# Patient Record
Sex: FEMALE | Race: WHITE | Employment: FULL TIME | URBAN - METROPOLITAN AREA
[De-identification: names, ages, dates, MRNs, and addresses within clinical notes are randomized per-mention and may not be internally consistent; named-entity substitution may affect disease eponyms.]

---

## 2017-01-09 ENCOUNTER — LAB ENCOUNTER (OUTPATIENT)
Dept: LAB | Facility: HOSPITAL | Age: 62
End: 2017-01-09
Attending: FAMILY MEDICINE
Payer: COMMERCIAL

## 2017-01-09 DIAGNOSIS — E78.5 HYPERLIPIDEMIA, UNSPECIFIED HYPERLIPIDEMIA TYPE: ICD-10-CM

## 2017-01-09 DIAGNOSIS — IMO0001 UNCONTROLLED TYPE 2 DIABETES MELLITUS WITHOUT COMPLICATION, WITH LONG-TERM CURRENT USE OF INSULIN: ICD-10-CM

## 2017-01-09 DIAGNOSIS — I10 BENIGN HYPERTENSION: ICD-10-CM

## 2017-01-09 LAB
ALBUMIN SERPL-MCNC: 4.1 G/DL (ref 3.5–4.8)
ALP LIVER SERPL-CCNC: 72 U/L (ref 50–130)
ALT SERPL-CCNC: 38 U/L (ref 14–54)
AST SERPL-CCNC: 18 U/L (ref 15–41)
BASOPHILS # BLD AUTO: 0.05 X10(3) UL (ref 0–0.1)
BASOPHILS NFR BLD AUTO: 0.9 %
BILIRUB SERPL-MCNC: 0.5 MG/DL (ref 0.1–2)
BUN BLD-MCNC: 20 MG/DL (ref 8–20)
CALCIUM BLD-MCNC: 9.1 MG/DL (ref 8.3–10.3)
CHLORIDE: 107 MMOL/L (ref 101–111)
CHOLEST SMN-MCNC: 273 MG/DL (ref ?–200)
CO2: 24 MMOL/L (ref 22–32)
CREAT BLD-MCNC: 0.7 MG/DL (ref 0.55–1.02)
CREAT UR-SCNC: 44.5 MG/DL
EOSINOPHIL # BLD AUTO: 0.1 X10(3) UL (ref 0–0.3)
EOSINOPHIL NFR BLD AUTO: 1.8 %
ERYTHROCYTE [DISTWIDTH] IN BLOOD BY AUTOMATED COUNT: 12.6 % (ref 11.5–16)
EST. AVERAGE GLUCOSE BLD GHB EST-MCNC: 117 MG/DL (ref 68–126)
FREE T4: 1 NG/DL (ref 0.9–1.8)
GLUCOSE BLD-MCNC: 112 MG/DL (ref 70–99)
HBA1C MFR BLD HPLC: 5.7 % (ref ?–5.7)
HCT VFR BLD AUTO: 44.4 % (ref 34–50)
HDLC SERPL-MCNC: 60 MG/DL (ref 45–?)
HDLC SERPL: 4.55 {RATIO} (ref ?–4.44)
HGB BLD-MCNC: 14.7 G/DL (ref 12–16)
IMMATURE GRANULOCYTE COUNT: 0.02 X10(3) UL (ref 0–1)
IMMATURE GRANULOCYTE RATIO %: 0.4 %
LDLC SERPL CALC-MCNC: 183 MG/DL (ref ?–130)
LYMPHOCYTES # BLD AUTO: 2.85 X10(3) UL (ref 0.9–4)
LYMPHOCYTES NFR BLD AUTO: 52.2 %
M PROTEIN MFR SERPL ELPH: 7.8 G/DL (ref 6.1–8.3)
MCH RBC QN AUTO: 30.1 PG (ref 27–33.2)
MCHC RBC AUTO-ENTMCNC: 33.1 G/DL (ref 31–37)
MCV RBC AUTO: 90.8 FL (ref 81–100)
MICROALBUMIN UR-MCNC: 1.32 MG/DL
MICROALBUMIN/CREAT 24H UR-RTO: 29.7 UG/MG (ref ?–30)
MONOCYTES # BLD AUTO: 0.44 X10(3) UL (ref 0.1–0.6)
MONOCYTES NFR BLD AUTO: 8.1 %
NEUTROPHIL ABS PRELIM: 2 X10 (3) UL (ref 1.3–6.7)
NEUTROPHILS # BLD AUTO: 2 X10(3) UL (ref 1.3–6.7)
NEUTROPHILS NFR BLD AUTO: 36.6 %
NONHDLC SERPL-MCNC: 213 MG/DL (ref ?–130)
PLATELET # BLD AUTO: 237 10(3)UL (ref 150–450)
POTASSIUM SERPL-SCNC: 4.3 MMOL/L (ref 3.6–5.1)
RBC # BLD AUTO: 4.89 X10(6)UL (ref 3.8–5.1)
RED CELL DISTRIBUTION WIDTH-SD: 41.9 FL (ref 35.1–46.3)
SODIUM SERPL-SCNC: 139 MMOL/L (ref 136–144)
TRIGLYCERIDES: 150 MG/DL (ref ?–150)
TSI SER-ACNC: 1.09 MIU/ML (ref 0.35–5.5)
VLDL: 30 MG/DL (ref 5–40)
WBC # BLD AUTO: 5.5 X10(3) UL (ref 4–13)

## 2017-01-09 PROCEDURE — 36415 COLL VENOUS BLD VENIPUNCTURE: CPT

## 2017-01-09 PROCEDURE — 85025 COMPLETE CBC W/AUTO DIFF WBC: CPT

## 2017-01-09 PROCEDURE — 80053 COMPREHEN METABOLIC PANEL: CPT

## 2017-01-09 PROCEDURE — 82043 UR ALBUMIN QUANTITATIVE: CPT

## 2017-01-09 PROCEDURE — 80061 LIPID PANEL: CPT

## 2017-01-09 PROCEDURE — 83036 HEMOGLOBIN GLYCOSYLATED A1C: CPT

## 2017-01-09 PROCEDURE — 82570 ASSAY OF URINE CREATININE: CPT

## 2017-01-09 PROCEDURE — 84439 ASSAY OF FREE THYROXINE: CPT

## 2017-01-09 PROCEDURE — 84443 ASSAY THYROID STIM HORMONE: CPT

## 2017-05-05 ENCOUNTER — APPOINTMENT (OUTPATIENT)
Dept: LAB | Facility: HOSPITAL | Age: 62
End: 2017-05-05
Attending: FAMILY MEDICINE
Payer: COMMERCIAL

## 2017-05-05 DIAGNOSIS — E11.9 TYPE 2 DIABETES MELLITUS WITHOUT COMPLICATION, WITHOUT LONG-TERM CURRENT USE OF INSULIN (HCC): ICD-10-CM

## 2017-05-05 PROCEDURE — 83036 HEMOGLOBIN GLYCOSYLATED A1C: CPT

## 2017-05-05 PROCEDURE — 80048 BASIC METABOLIC PNL TOTAL CA: CPT

## 2017-05-05 PROCEDURE — 36415 COLL VENOUS BLD VENIPUNCTURE: CPT

## 2017-08-23 ENCOUNTER — APPOINTMENT (OUTPATIENT)
Dept: LAB | Facility: HOSPITAL | Age: 62
End: 2017-08-23
Attending: FAMILY MEDICINE
Payer: COMMERCIAL

## 2017-08-23 DIAGNOSIS — R73.9 HYPERGLYCEMIA: ICD-10-CM

## 2017-08-23 DIAGNOSIS — Z86.39 HISTORY OF DIABETES MELLITUS: ICD-10-CM

## 2017-08-23 LAB
BUN BLD-MCNC: 12 MG/DL (ref 8–20)
CALCIUM BLD-MCNC: 9.7 MG/DL (ref 8.3–10.3)
CHLORIDE: 107 MMOL/L (ref 101–111)
CO2: 23 MMOL/L (ref 22–32)
CREAT BLD-MCNC: 0.69 MG/DL (ref 0.55–1.02)
EST. AVERAGE GLUCOSE BLD GHB EST-MCNC: 123 MG/DL (ref 68–126)
GLUCOSE BLD-MCNC: 112 MG/DL (ref 70–99)
HBA1C MFR BLD HPLC: 5.9 % (ref ?–5.7)
POTASSIUM SERPL-SCNC: 4.1 MMOL/L (ref 3.6–5.1)
SODIUM SERPL-SCNC: 139 MMOL/L (ref 136–144)

## 2017-08-23 PROCEDURE — 80048 BASIC METABOLIC PNL TOTAL CA: CPT

## 2017-08-23 PROCEDURE — 83036 HEMOGLOBIN GLYCOSYLATED A1C: CPT

## 2017-08-23 PROCEDURE — 36415 COLL VENOUS BLD VENIPUNCTURE: CPT

## 2018-12-06 ENCOUNTER — HOSPITAL ENCOUNTER (EMERGENCY)
Facility: HOSPITAL | Age: 63
Discharge: HOME | End: 2018-12-06
Attending: EMERGENCY MEDICINE
Payer: COMMERCIAL

## 2018-12-06 VITALS
DIASTOLIC BLOOD PRESSURE: 87 MMHG | RESPIRATION RATE: 14 BRPM | HEART RATE: 95 BPM | HEIGHT: 68 IN | SYSTOLIC BLOOD PRESSURE: 148 MMHG | BODY MASS INDEX: 27.28 KG/M2 | OXYGEN SATURATION: 97 % | WEIGHT: 180 LBS | TEMPERATURE: 98 F

## 2018-12-06 DIAGNOSIS — R04.0 EPISTAXIS: Primary | ICD-10-CM

## 2018-12-06 PROCEDURE — 99284 EMERGENCY DEPT VISIT MOD MDM: CPT

## 2018-12-06 PROCEDURE — 30903 CONTROL OF NOSEBLEED: CPT

## 2018-12-06 RX ORDER — TRAMADOL HYDROCHLORIDE 50 MG/1
50 TABLET ORAL ONCE
Status: COMPLETED | OUTPATIENT
Start: 2018-12-06 | End: 2018-12-06

## 2018-12-06 RX ORDER — TRAMADOL HYDROCHLORIDE 50 MG/1
50 TABLET ORAL EVERY 6 HOURS PRN
Qty: 10 TABLET | Refills: 0 | Status: SHIPPED | OUTPATIENT
Start: 2018-12-06 | End: 2018-12-09

## 2018-12-06 NOTE — ED PROVIDER NOTES
Patient Seen in: [facility] Emergency Department    History   Patient presents with:  Nose Bleed (nasopharyngeal)    Stated Complaint: Nosebleed for last few hours, no blood thinners    HPI    Patient is a 69-year-old female comes in emergency room Afrin-soaked cotton ball inserted into the right naris. It was removed. Attempted to cauterize right medial nasal septum but bleeding still persisted. 7.5 cm Rhino Rocket inserted with control of bleeding into right naris.       MDM   Patient is a 63-yea

## 2020-03-17 ENCOUNTER — HOSPITAL ENCOUNTER (OUTPATIENT)
Dept: MAMMOGRAPHY | Age: 65
Discharge: HOME | End: 2020-03-17
Attending: NURSE PRACTITIONER
Payer: COMMERCIAL

## 2020-03-17 DIAGNOSIS — Z12.31 BREAST CANCER SCREENING BY MAMMOGRAM: ICD-10-CM

## 2020-03-17 PROCEDURE — 77063 BREAST TOMOSYNTHESIS BI: CPT | Performed by: NURSE PRACTITIONER

## 2020-03-17 PROCEDURE — 77067 SCR MAMMO BI INCL CAD: CPT | Performed by: NURSE PRACTITIONER

## 2021-03-15 DIAGNOSIS — Z23 NEED FOR VACCINATION: ICD-10-CM

## 2021-06-24 ENCOUNTER — HOSPITAL ENCOUNTER (OUTPATIENT)
Dept: BONE DENSITY | Age: 66
Discharge: HOME | End: 2021-06-24
Attending: NURSE PRACTITIONER
Payer: MEDICARE

## 2021-06-24 ENCOUNTER — HOSPITAL ENCOUNTER (OUTPATIENT)
Dept: MAMMOGRAPHY | Age: 66
Discharge: HOME | End: 2021-06-24
Attending: NURSE PRACTITIONER
Payer: MEDICARE

## 2021-06-24 DIAGNOSIS — Z12.31 BREAST CANCER SCREENING BY MAMMOGRAM: ICD-10-CM

## 2021-06-24 DIAGNOSIS — Z78.0 ASYMPTOMATIC MENOPAUSAL STATE: ICD-10-CM

## 2021-06-24 PROCEDURE — 77063 BREAST TOMOSYNTHESIS BI: CPT | Performed by: NURSE PRACTITIONER

## 2021-06-24 PROCEDURE — 77080 DXA BONE DENSITY AXIAL: CPT | Performed by: NURSE PRACTITIONER

## 2021-06-24 PROCEDURE — 77067 SCR MAMMO BI INCL CAD: CPT | Performed by: NURSE PRACTITIONER

## 2021-07-09 ENCOUNTER — HOSPITAL ENCOUNTER (OUTPATIENT)
Dept: MAMMOGRAPHY | Facility: HOSPITAL | Age: 66
Discharge: HOME | End: 2021-07-09
Attending: NURSE PRACTITIONER
Payer: MEDICARE

## 2021-07-09 DIAGNOSIS — R92.2 INCONCLUSIVE MAMMOGRAM: ICD-10-CM

## 2021-07-09 PROCEDURE — 77065 DX MAMMO INCL CAD UNI: CPT | Performed by: NURSE PRACTITIONER

## 2021-07-09 PROCEDURE — 76642 ULTRASOUND BREAST LIMITED: CPT | Performed by: NURSE PRACTITIONER

## 2021-07-09 PROCEDURE — 77061 BREAST TOMOSYNTHESIS UNI: CPT | Performed by: NURSE PRACTITIONER

## 2023-04-25 ENCOUNTER — HOSPITAL ENCOUNTER (OUTPATIENT)
Dept: ULTRASOUND IMAGING | Age: 68
Discharge: HOME | End: 2023-04-25
Attending: NURSE PRACTITIONER
Payer: MEDICARE

## 2023-04-25 DIAGNOSIS — R09.89 CAROTID BRUIT, UNSPECIFIED LATERALITY: ICD-10-CM

## 2023-04-25 PROCEDURE — 93880 EXTRACRANIAL BILAT STUDY: CPT | Performed by: NURSE PRACTITIONER

## 2024-01-16 ENCOUNTER — HOSPITAL ENCOUNTER (OUTPATIENT)
Dept: GENERAL RADIOLOGY | Age: 69
Discharge: HOME | End: 2024-01-16
Attending: NURSE PRACTITIONER
Payer: MEDICARE

## 2024-01-16 ENCOUNTER — LAB ENCOUNTER (OUTPATIENT)
Dept: LAB | Age: 69
End: 2024-01-16
Attending: NURSE PRACTITIONER
Payer: MEDICARE

## 2024-01-16 ENCOUNTER — TELEPHONE (OUTPATIENT)
Dept: FAMILY MEDICINE CLINIC | Facility: CLINIC | Age: 69
End: 2024-01-16

## 2024-01-16 ENCOUNTER — OFFICE VISIT (OUTPATIENT)
Dept: FAMILY MEDICINE CLINIC | Facility: CLINIC | Age: 69
End: 2024-01-16
Payer: COMMERCIAL

## 2024-01-16 VITALS
BODY MASS INDEX: 31 KG/M2 | HEIGHT: 64 IN | HEART RATE: 112 BPM | OXYGEN SATURATION: 96 % | DIASTOLIC BLOOD PRESSURE: 80 MMHG | TEMPERATURE: 97 F | RESPIRATION RATE: 20 BRPM | SYSTOLIC BLOOD PRESSURE: 140 MMHG

## 2024-01-16 DIAGNOSIS — R09.89 RHONCHI: ICD-10-CM

## 2024-01-16 DIAGNOSIS — E78.00 HYPERCHOLESTEROLEMIA: ICD-10-CM

## 2024-01-16 DIAGNOSIS — R03.0 ELEVATED BLOOD PRESSURE READING IN OFFICE WITHOUT DIAGNOSIS OF HYPERTENSION: ICD-10-CM

## 2024-01-16 DIAGNOSIS — E11.9 TYPE 2 DIABETES MELLITUS WITHOUT COMPLICATION, WITHOUT LONG-TERM CURRENT USE OF INSULIN (HCC): Primary | ICD-10-CM

## 2024-01-16 DIAGNOSIS — R05.3 PERSISTENT COUGH FOR 3 WEEKS OR LONGER: ICD-10-CM

## 2024-01-16 DIAGNOSIS — E11.9 TYPE 2 DIABETES MELLITUS WITHOUT COMPLICATION, WITHOUT LONG-TERM CURRENT USE OF INSULIN (HCC): ICD-10-CM

## 2024-01-16 DIAGNOSIS — R05.8 SPASMODIC COUGH: ICD-10-CM

## 2024-01-16 LAB
ALBUMIN SERPL-MCNC: 3.5 G/DL (ref 3.4–5)
ALBUMIN/GLOB SERPL: 0.8 {RATIO} (ref 1–2)
ALP LIVER SERPL-CCNC: 90 U/L
ALT SERPL-CCNC: 37 U/L
ANION GAP SERPL CALC-SCNC: 7 MMOL/L (ref 0–18)
AST SERPL-CCNC: 41 U/L (ref 15–37)
BILIRUB SERPL-MCNC: 0.5 MG/DL (ref 0.1–2)
BUN BLD-MCNC: 9 MG/DL (ref 9–23)
CALCIUM BLD-MCNC: 9.8 MG/DL (ref 8.5–10.1)
CHLORIDE SERPL-SCNC: 110 MMOL/L (ref 98–112)
CHOLEST SERPL-MCNC: 135 MG/DL (ref ?–200)
CO2 SERPL-SCNC: 21 MMOL/L (ref 21–32)
CREAT BLD-MCNC: 0.86 MG/DL
EGFRCR SERPLBLD CKD-EPI 2021: 74 ML/MIN/1.73M2 (ref 60–?)
EST. AVERAGE GLUCOSE BLD GHB EST-MCNC: 140 MG/DL (ref 68–126)
FASTING PATIENT LIPID ANSWER: YES
FASTING STATUS PATIENT QL REPORTED: YES
GLOBULIN PLAS-MCNC: 4.6 G/DL (ref 2.8–4.4)
GLUCOSE BLD-MCNC: 136 MG/DL (ref 70–99)
HBA1C MFR BLD: 6.5 % (ref ?–5.7)
HDLC SERPL-MCNC: 44 MG/DL (ref 40–59)
LDLC SERPL CALC-MCNC: 67 MG/DL (ref ?–100)
NONHDLC SERPL-MCNC: 91 MG/DL (ref ?–130)
OSMOLALITY SERPL CALC.SUM OF ELEC: 287 MOSM/KG (ref 275–295)
POTASSIUM SERPL-SCNC: 3.5 MMOL/L (ref 3.5–5.1)
PROT SERPL-MCNC: 8.1 G/DL (ref 6.4–8.2)
SODIUM SERPL-SCNC: 138 MMOL/L (ref 136–145)
TRIGL SERPL-MCNC: 136 MG/DL (ref 30–149)
VLDLC SERPL CALC-MCNC: 21 MG/DL (ref 0–30)

## 2024-01-16 PROCEDURE — 86615 BORDETELLA ANTIBODY: CPT

## 2024-01-16 PROCEDURE — 1160F RVW MEDS BY RX/DR IN RCRD: CPT | Performed by: NURSE PRACTITIONER

## 2024-01-16 PROCEDURE — 80061 LIPID PANEL: CPT

## 2024-01-16 PROCEDURE — 71046 X-RAY EXAM CHEST 2 VIEWS: CPT | Performed by: NURSE PRACTITIONER

## 2024-01-16 PROCEDURE — 3077F SYST BP >= 140 MM HG: CPT | Performed by: NURSE PRACTITIONER

## 2024-01-16 PROCEDURE — 99204 OFFICE O/P NEW MOD 45 MIN: CPT | Performed by: NURSE PRACTITIONER

## 2024-01-16 PROCEDURE — 1159F MED LIST DOCD IN RCRD: CPT | Performed by: NURSE PRACTITIONER

## 2024-01-16 PROCEDURE — 3008F BODY MASS INDEX DOCD: CPT | Performed by: NURSE PRACTITIONER

## 2024-01-16 PROCEDURE — 80053 COMPREHEN METABOLIC PANEL: CPT

## 2024-01-16 PROCEDURE — 1170F FXNL STATUS ASSESSED: CPT | Performed by: NURSE PRACTITIONER

## 2024-01-16 PROCEDURE — 83036 HEMOGLOBIN GLYCOSYLATED A1C: CPT

## 2024-01-16 PROCEDURE — 3079F DIAST BP 80-89 MM HG: CPT | Performed by: NURSE PRACTITIONER

## 2024-01-16 RX ORDER — ROSUVASTATIN CALCIUM 10 MG/1
10 TABLET, COATED ORAL NIGHTLY
COMMUNITY
Start: 2024-01-14

## 2024-01-16 RX ORDER — LEVOFLOXACIN 500 MG/1
500 TABLET, FILM COATED ORAL DAILY
Qty: 10 TABLET | Refills: 0 | Status: SHIPPED | OUTPATIENT
Start: 2024-01-16 | End: 2024-01-26

## 2024-01-16 NOTE — PROGRESS NOTES
Ilda Gutierrez is a 68 year old female.  HPI:   New patient, [facility]. Diagnosed 7-8 years ago with diabetes. She has been taking her medications as ordered. She denies any chest pain, dizziness or lightheadedness. She does check her blood sugars at home--FBS this morning 104.   Last DM eye exam: 10/2023-->[facility].  She denies any numbness or tingling to her extremities.    She reports persistent spasmodic, \"barky\" cough that started September 2023. Sometimes cough is dry while other times productive. Feels cough has gotten worse over the past 1 month. Sometimes will feel short of breath. No fever or chills. Reports hx of chronic bronchitis. No other sick members at home. Using steam to help with cough, using vaporizer. OTC: DayQuil, Vicks. Using heating pad at night. Last Tdap--does not recall    Wt Readings from Last 6 Encounters:   12/06/18 180 lb (81.6 kg)     Current Outpatient Medications   Medication Sig Dispense Refill    metFORMIN HCl 1000 MG Oral Tab Take 1 tablet (1,000 mg total) by mouth 2 (two) times daily with meals.      rosuvastatin 10 MG Oral Tab Take 1 tablet (10 mg total) by mouth nightly.        Past Medical History:   Diagnosis Date    Hyperlipidemia       Social History:  Social History     Socioeconomic History    Marital status:    Tobacco Use    Smoking status: Never    Smokeless tobacco: Never   Vaping Use    Vaping Use: Never used   Substance and Sexual Activity    Alcohol use: Not Currently    Drug use: Never        REVIEW OF SYSTEMS:   GENERAL HEALTH: feels well otherwise  RESPIRATORY: SEE HPI  CARDIOVASCULAR: denies chest pain on exertion  GI: denies abdominal pain and denies heartburn  NEURO: denies headaches  Musculoskeletal: No motor deficits  EXAM:   /80   Pulse 112   Temp 97.2 °F (36.2 °C) (Temporal)   Resp 20   Ht 5' 4\" (1.626 m)   SpO2 96%   BMI 30.90 kg/m²   GENERAL: well developed, well nourished,in no apparent distress  HEENT: TM clear  bilaterally, nose no congestion, throat clear no erythema without mass.   EYES: PERRLA, EOM intact, sclera clear without injection  NECK: supple,no adenopathy  LUNGS: coarse rhonchi scattered throughout lung fields.   CARDIO: RRR without murmur  GI: Normal bowel sounds ×4 quadrant, no hepatosplenomegaly or masses, and no tenderness   EXTREMITIES: no cyanosis, clubbing or edema; Pulsation pedal pulse exam of both lower legs/feet is normal as well.  Bilateral barefoot skin diabetic exam is normal, visualized feet and the appearance is normal.  Bilateral monofilament/sensation of both feet is normal.  Musculoskeletal: No gross deficit  Neurological: nerves II through XII grossly intact no sensorimotor deficit  Psychological: Mood and affect are normal.  Good communication skills.  ASSESSMENT AND PLAN:     Encounter Diagnoses   Name Primary?    Type 2 diabetes mellitus without complication, without long-term current use of insulin (HCC) Yes    Hypercholesterolemia     Persistent cough for 3 weeks or longer     Spasmodic cough     Elevated blood pressure reading in office without diagnosis of hypertension        Orders Placed This Encounter   Procedures    Lipid Panel    Comp Metabolic Panel (14)    Hemoglobin A1C [E]    B.Pertussisb.Parapertussis Pcr [E]    B Pertussis IgG/IgM Ab, Quant [E]       Meds & Refills for this Visit:  Requested Prescriptions      No prescriptions requested or ordered in this encounter       Imaging & Consults:  None    Follow Up with:  No follow-up provider specified.    1. Type 2 diabetes mellitus without complication, without long-term current use of insulin (HCC)  - Lipid Panel; Future  - Comp Metabolic Panel (14); Future  - Hemoglobin A1C [E]; Future    2. Hypercholesterolemia  - Lipid Panel; Future  - Comp Metabolic Panel (14); Future    3. Persistent cough for 3 weeks or longer  - XR CHEST PA + LAT CHEST (CPT=71046); Future  - B.Pertussisb.Parapertussis Pcr [E]; Future  - B Pertussis  IgG/IgM Ab, Quant [E]; Future    4. Spasmodic cough    5. Rhonchi  - XR CHEST PA + LAT CHEST (CPT=71046); Future    6. Elevated blood pressure reading in office without diagnosis of hypertension      BP elevated on exam today x 2. Per patient she has not taken HTN medication previously.   CXR as ordered.  Pertussis PCR/IgG/IgM as ordered.  Labs today.  Await XR results for further recommendations.  Follow up in office in 1 week re: elevated BP and cough      The patient indicates understanding of these issues and agrees to the plan.  The patient is asked to return in 1 wk.

## 2024-01-17 LAB
B PERTUSSIS IGG AB: 1.51 INDEX
B PERTUSSIS IGG AB: 1.51 INDEX
B PERTUSSIS IGM AB: 1.1 INDEX
B PERTUSSIS IGM AB: 1.1 INDEX

## 2024-01-22 ENCOUNTER — TELEPHONE (OUTPATIENT)
Dept: FAMILY MEDICINE CLINIC | Facility: CLINIC | Age: 69
End: 2024-01-22

## 2024-01-23 RX ORDER — AZITHROMYCIN 250 MG/1
TABLET, FILM COATED ORAL
Qty: 6 TABLET | Refills: 0 | Status: SHIPPED | OUTPATIENT
Start: 2024-01-23 | End: 2024-01-27

## 2024-01-25 ENCOUNTER — TELEMEDICINE (OUTPATIENT)
Dept: FAMILY MEDICINE CLINIC | Facility: CLINIC | Age: 69
End: 2024-01-25
Payer: COMMERCIAL

## 2024-01-25 DIAGNOSIS — A37.90 PERTUSSIS: ICD-10-CM

## 2024-01-25 DIAGNOSIS — E11.9 TYPE 2 DIABETES MELLITUS WITHOUT COMPLICATION, WITHOUT LONG-TERM CURRENT USE OF INSULIN (HCC): ICD-10-CM

## 2024-01-25 DIAGNOSIS — R03.0 ELEVATED BLOOD PRESSURE READING IN OFFICE WITHOUT DIAGNOSIS OF HYPERTENSION: ICD-10-CM

## 2024-01-25 DIAGNOSIS — J18.9 PNEUMONIA OF LEFT LOWER LOBE DUE TO INFECTIOUS ORGANISM: Primary | ICD-10-CM

## 2024-01-25 DIAGNOSIS — E78.00 HYPERCHOLESTEROLEMIA: ICD-10-CM

## 2024-01-25 DIAGNOSIS — R05.8 SPASMODIC COUGH: ICD-10-CM

## 2024-01-25 PROCEDURE — 1160F RVW MEDS BY RX/DR IN RCRD: CPT | Performed by: NURSE PRACTITIONER

## 2024-01-25 PROCEDURE — 1159F MED LIST DOCD IN RCRD: CPT | Performed by: NURSE PRACTITIONER

## 2024-01-25 PROCEDURE — 99214 OFFICE O/P EST MOD 30 MIN: CPT | Performed by: NURSE PRACTITIONER

## 2024-01-25 NOTE — PROGRESS NOTES
Subjective     HPI:   Ilda Gutierrez verbally consents to a Virtual/Telephone Check-In service on 01/25/24. Patient understands and accepts financial responsibility for any deductible, co-insurance and/or co-pays associated with this service. This visit is conducted using Telemedicine with live, interactive video and audio.     I returned Ilda Gutierrez call by secure video chat, verified date of birth, and discussed their current concerns:   Follow-up on recent pneumonia and pertussis diagnosis.  Patient was seen in the office last week and imaging completed suggestive of left lower lobe pneumonia.  Her IgM titer for pertussis came back at 1.1.  She reports it has been 20+ years since she received a Tdap vaccine.  Patient has been taking the Levaquin and azithromycin as ordered.  She denies any side effects of the medication.  She denies any muscle aches, joint pains or cramping.  She reports that her cough has markedly improved compared to last week.  Notes occasional cough but cough is no longer spasmodic or productive.  She denies any shortness of breath or chest pain.  She denies any fever or chills.  Patient had recent labs completed which showed her A1c was at 6.5%.  She is currently taking metformin 500 mg twice daily.  We discussed increasing this to metformin 1000 mg twice daily.  On patient's x-ray it did note atheromatosis calcified plaque seen within the aorta.  Patient did have a carotid Doppler in 2023 which showed mixed atherosclerotic plaque at the carotid bifurcations bilaterally.  Patient is taking a statin.  Her most recent LDL was 67.  She has never had an ultrafast heart scan completed.  She is not taking a baby aspirin.  Monitors blood sugars in the morning--every other day, sometimes in the afternoon and readings around 110 2 hours post prandial.   Checking blood pressure at home- most recent readings  116/68  127/69       No Further Nursing Notes to Review            REVIEW OF  SYSTEMS:  Pertinent items are noted in HPI.             Physical Exam:  alert, appears stated age, and cooperative, Speaking in full sentences comfortably, Normal work of breathing, and mood and affect are normal--good communication        Assessment    Diagnoses and all orders for this visit:    Pneumonia of left lower lobe due to infectious organism  -     XR CHEST PA + LAT CHEST (CPT=71046); Future    Pertussis    Spasmodic cough    Elevated blood pressure reading in office without diagnosis of hypertension    Type 2 diabetes mellitus without complication, without long-term current use of insulin (HCC)    Hypercholesterolemia    Reviewed recent lab work with the patient as well as CXR.  Discussed completing [facility]- information provided to patient.  Reviewed and discussed US carotid doppler 2023 with the patient.   Discussed goal of LDL <70.  Continue statin.  START enteric coated ASA 81 mg daily.  Increase Metformin to 1,000 mg BID.  Focus on healthy eating, routine exercise.  Continue to monitor BP at home--call office if BP consistently >140/90.  Reviewed CXR with the patient.  Repeat CXR in 1 month.  Finish course of Levaquin.  Finish course of Azithromycin.  Schedule nurse visit 6 wks for Tdap. Discussed vaccination, indication and potential side effects with this vaccination.      Please note that the following visit was completed using two-way, real-time interactive audio and video communication. This has been done in good sudha to provide continuity of care the in the base interest of the provider/patient relationship, due to the ongoing public health crisis/national emergency and because of restrictions of visitation. There are limitations of this visit as a limited physical exam could be performed. Every conscious effort was taken to allow for sufficient and adequate time. Time was spent on reviewing labs, medications, radiology tests and decision making. Appropriate medical decision-making and tests are  ordered as detailed in the plan of care above.           Follow up: 05/2024--sooner if new/worsening symptoms  Time of visit: 18 minutes spent with visit in addition to 5 minutes in chart review/chart prep and 5 minutes of documentation and coordination of care.    Olivia Jose, APRN

## 2024-01-26 ENCOUNTER — TELEPHONE (OUTPATIENT)
Dept: FAMILY MEDICINE CLINIC | Facility: CLINIC | Age: 69
End: 2024-01-26

## 2024-02-23 ENCOUNTER — HOSPITAL ENCOUNTER (OUTPATIENT)
Dept: GENERAL RADIOLOGY | Age: 69
Discharge: HOME | End: 2024-02-23
Attending: NURSE PRACTITIONER
Payer: MEDICARE

## 2024-02-23 DIAGNOSIS — J18.9 PNEUMONIA OF LEFT LOWER LOBE DUE TO INFECTIOUS ORGANISM: ICD-10-CM

## 2024-02-23 PROCEDURE — 71046 X-RAY EXAM CHEST 2 VIEWS: CPT | Performed by: NURSE PRACTITIONER

## 2024-04-18 ENCOUNTER — PATIENT OUTREACH (OUTPATIENT)
Dept: FAMILY MEDICINE CLINIC | Facility: CLINIC | Age: 69
End: 2024-04-18

## 2024-04-18 NOTE — PROGRESS NOTES
Patient is overdue for annual physical, colorectal screening and mammogram.  [employer] message sent and encounter routed to front office to assist with scheduling.

## 2024-05-09 ENCOUNTER — TELEPHONE (OUTPATIENT)
Dept: FAMILY MEDICINE CLINIC | Facility: CLINIC | Age: 69
End: 2024-05-09

## 2024-05-09 NOTE — TELEPHONE ENCOUNTER
Noted below. With patient report symptoms of chest tightness and shortness of breath she needs evaluation today. Would advise being seen at [facility].

## 2024-05-09 NOTE — TELEPHONE ENCOUNTER
MONSERRAT: I called the patient and informed her of Olivia Jose's orders below. I explained to her if she is not evaluated today in the immediate care or the emergency room for her symptoms it is against medical advice. I did tell the patient it was not appropriate to wait to be seen on Saturday due to the nature of her symptoms and she would be sent to the emergency room for testing and evaluation. Pt stated \" then cancel my appointment for Saturday, right now I am on my way to the vet with my dog.\" I again I reinforced the urgent nature of her symptoms and advised her to go today to the immediate care or the emergency room to be seen. Pt stated \" ok\" and the call was disconnected.

## 2024-05-09 NOTE — TELEPHONE ENCOUNTER
She needs to be evaluated TODAY at IC or ER. Given her symptoms of chest tightness and shortness of breath with exertion she needs to be evaluated for DVT/PE given her symptoms. I do not recommend waiting, recommend being seen TODAY to expedite her care. If she chooses not to be evaluated this is against medical advice.

## 2024-05-09 NOTE — TELEPHONE ENCOUNTER
I called and spoke to the patient. For the past 2-3 months she has had a cough. It is not productive. She stated \" I have had the cough since I had pneumonia back in January.\" She is also having shortness of breath with exertion with chest tightness. No pain w/ inspiration. I did ask her if she has any personal history of cardiac issues and she stated \" no\" She does have a family history ( father ) of cardiac issues. She denies left arm pain, neck pain, no clammy feeling. Please advise

## 2024-05-09 NOTE — TELEPHONE ENCOUNTER
Pt made MyChart appointment with following message.    Appointment For: Ilda Gutierrez ([id number])   Visit Type: MYCHART FOLLOW UP (3209)      5/11/2024   11:00 AM  30 mins.  Olivia Jose             [facility] [address]      Patient Comments:   Cough and breathing issue

## 2024-05-09 NOTE — TELEPHONE ENCOUNTER
I called the patient and informed her of your recommendation to go to the immediate care to be seen today. Pt states\" I can not go today.\" She said her insurance will not cover a visit to the immediate care. She can only go to [facility] urgent care. I did explain to her with her symptoms and her family history it is against medical advice to not be evaluated today. Patient stated \" I have had this off/ on since I had pneumonia. I do not feel like it is an emergency. The only thing is I use to be able to walk a few miles and now I can not walk very far at all without having shortness of breath and chest tightness.\" I informed the patient it may or may not be related to the pneumonia but we can also not say that it is not cardiac related. Pt would like to keep her appointment for Saturday. She does not want to go to the immediate care. I informed the patient I will forward her response to KAI Spencer. Pt verbalized understanding.

## 2024-05-14 ENCOUNTER — ORDER TRANSCRIPTION (OUTPATIENT)
Dept: ADMINISTRATIVE | Facility: HOSPITAL | Age: 69
End: 2024-05-14

## 2024-05-14 DIAGNOSIS — Z13.6 SCREENING FOR CARDIOVASCULAR CONDITION: Primary | ICD-10-CM

## 2024-05-17 ENCOUNTER — HOSPITAL ENCOUNTER (OUTPATIENT)
Dept: CT IMAGING | Age: 69
Discharge: HOME | End: 2024-05-17
Attending: NURSE PRACTITIONER

## 2024-05-17 DIAGNOSIS — Z13.6 SCREENING FOR CARDIOVASCULAR CONDITION: ICD-10-CM

## 2024-05-21 DIAGNOSIS — R93.1 ABNORMAL CT SCAN OF HEART: Primary | ICD-10-CM

## 2024-05-22 ENCOUNTER — OFFICE VISIT (OUTPATIENT)
Dept: FAMILY MEDICINE CLINIC | Facility: CLINIC | Age: 69
End: 2024-05-22

## 2024-05-22 VITALS
TEMPERATURE: 99 F | HEIGHT: 64.02 IN | RESPIRATION RATE: 16 BRPM | WEIGHT: 173 LBS | SYSTOLIC BLOOD PRESSURE: 152 MMHG | BODY MASS INDEX: 29.53 KG/M2 | DIASTOLIC BLOOD PRESSURE: 80 MMHG | HEART RATE: 90 BPM

## 2024-05-22 DIAGNOSIS — R93.1 ELEVATED CORONARY ARTERY CALCIUM SCORE: ICD-10-CM

## 2024-05-22 DIAGNOSIS — I70.90 ATHEROSCLEROSIS OF ARTERIES: Primary | ICD-10-CM

## 2024-05-22 DIAGNOSIS — Z12.31 ENCOUNTER FOR SCREENING MAMMOGRAM FOR MALIGNANT NEOPLASM OF BREAST: ICD-10-CM

## 2024-05-22 DIAGNOSIS — E11.9 TYPE 2 DIABETES MELLITUS WITHOUT COMPLICATION, WITHOUT LONG-TERM CURRENT USE OF INSULIN (HCC): ICD-10-CM

## 2024-05-22 DIAGNOSIS — I10 HYPERTENSION, UNSPECIFIED TYPE: ICD-10-CM

## 2024-05-22 DIAGNOSIS — H93.A9 PULSATILE TINNITUS: ICD-10-CM

## 2024-05-22 DIAGNOSIS — E78.00 HYPERCHOLESTEROLEMIA: ICD-10-CM

## 2024-05-22 PROCEDURE — 3077F SYST BP >= 140 MM HG: CPT | Performed by: NURSE PRACTITIONER

## 2024-05-22 PROCEDURE — 3044F HG A1C LEVEL LT 7.0%: CPT | Performed by: NURSE PRACTITIONER

## 2024-05-22 PROCEDURE — 1159F MED LIST DOCD IN RCRD: CPT | Performed by: NURSE PRACTITIONER

## 2024-05-22 PROCEDURE — 99214 OFFICE O/P EST MOD 30 MIN: CPT | Performed by: NURSE PRACTITIONER

## 2024-05-22 PROCEDURE — 3008F BODY MASS INDEX DOCD: CPT | Performed by: NURSE PRACTITIONER

## 2024-05-22 PROCEDURE — 3079F DIAST BP 80-89 MM HG: CPT | Performed by: NURSE PRACTITIONER

## 2024-05-22 PROCEDURE — G2211 COMPLEX E/M VISIT ADD ON: HCPCS | Performed by: NURSE PRACTITIONER

## 2024-05-22 RX ORDER — ROSUVASTATIN CALCIUM 20 MG/1
20 TABLET, COATED ORAL NIGHTLY
COMMUNITY

## 2024-05-22 RX ORDER — LISINOPRIL 10 MG/1
10 TABLET ORAL DAILY
Qty: 30 TABLET | Refills: 1 | Status: SHIPPED | OUTPATIENT
Start: 2024-05-22

## 2024-05-22 NOTE — PROGRESS NOTES
Ilda Gutierrez is a 69 year old female.  HPI:   Patient presents today for a follow up on recent UFHS score. Patient completed UFHS and has a score of 1679--severe atherosclerotic plaque. Patient is currently taking Rosuvastatin 10 mg. She does monitor her blood pressure at home. Reports this morning . She denies any chest pain, shortness of breath, dizziness or lightheadedness. She has been taking ASA 81 mg daily. Last A1c 01/2024--6.5%, she is taking Metformin as ordered. Denies any numbness or tingling. Reports a pulsatile tinnitus to left ear- intermittently the past few months. She has not seen ENT previously.    Will be going on a cruise in August to celebrate her 50th wedding anniversary!      Wt Readings from Last 6 Encounters:   05/22/24 173 lb (78.5 kg)   12/06/18 180 lb (81.6 kg)     Current Outpatient Medications   Medication Sig Dispense Refill    lisinopril 10 MG Oral Tab Take 1 tablet (10 mg total) by mouth daily. 30 tablet 1    metFORMIN HCl 1000 MG Oral Tab Take 1 tablet (1,000 mg total) by mouth 2 (two) times daily with meals.      rosuvastatin 10 MG Oral Tab Take 1 tablet (10 mg total) by mouth nightly.        Past Medical History:    Hyperlipidemia      Social History:  Social History     Socioeconomic History    Marital status:    Tobacco Use    Smoking status: Never    Smokeless tobacco: Never   Vaping Use    Vaping status: Never Used   Substance and Sexual Activity    Alcohol use: Not Currently    Drug use: Never        REVIEW OF SYSTEMS:   GENERAL HEALTH: feels well otherwise  HEENT: SEE HPI  RESPIRATORY: denies shortness of breath with exertion  CARDIOVASCULAR: denies chest pain on exertion  GI: denies abdominal pain  NEURO: denies headaches  Musculoskeletal: No motor deficits  EXAM:   /80   Pulse 90   Temp 99 °F (37.2 °C)   Resp 16   Ht 5' 4.02\" (1.626 m)   Wt 173 lb (78.5 kg)   BMI 29.68 kg/m²   GENERAL: well developed, well nourished,in no apparent  distress  HEENT: TM clear bilaterally, nose no congestion, throat clear no erythema without mass.   EYES: PERRLA, EOM intact, sclera clear without injection  NECK: supple,no adenopathy  LUNGS: clear to auscultation no rales, rhonchi or wheezes  CARDIO: RRR without murmur  EXTREMITIES: no cyanosis, clubbing or edema +2 posterior tibial pulses  Musculoskeletal: No gross deficit  Neurological: nerves II through XII grossly intact no sensorimotor deficit  Psychological: Mood and affect are normal.  Good communication skills.  ASSESSMENT AND PLAN:     Encounter Diagnoses   Name Primary?    Atherosclerosis of arteries Yes    Elevated coronary artery calcium score     Hypercholesterolemia     Hypertension, unspecified type     Type 2 diabetes mellitus without complication, without long-term current use of insulin (HCC)     Pulsatile tinnitus     Encounter for screening mammogram for malignant neoplasm of breast        Orders Placed This Encounter   Procedures    CMP Now    Lipids Now    Hemoglobin A1C Now    Microalb/Creat Ratio, Random Urine Now       Meds & Refills for this Visit:  Requested Prescriptions     Signed Prescriptions Disp Refills    lisinopril 10 MG Oral Tab 30 tablet 1     Sig: Take 1 tablet (10 mg total) by mouth daily.       Imaging & Consults:  ENT - INTERNAL  [facility] JYOTHI 2D+3D SCREENING BILAT (CPT=77067/02917)    Follow Up with:  No follow-up provider specified.  1. Atherosclerosis of arteries  - CMP Now; Future  - Lipids Now; Future    2. Elevated coronary artery calcium score  - CMP Now; Future  - Lipids Now; Future    3. Hypercholesterolemia  - CMP Now; Future  - Lipids Now; Future    4. Hypertension, unspecified type  - lisinopril 10 MG Oral Tab; Take 1 tablet (10 mg total) by mouth daily.  Dispense: 30 tablet; Refill: 1  - CMP Now; Future  - Lipids Now; Future    5. Type 2 diabetes mellitus without complication, without long-term current use of insulin (HCC)  - CMP Now; Future  - Lipids Now; Future  -  Hemoglobin A1C Now; Future  - Microalb/Creat Ratio, Random Urine Now; Future    6. Pulsatile tinnitus  - ENT Referral - In Network    7. Encounter for screening mammogram for malignant neoplasm of breast  - [facility] JYOTHI 2D+3D SCREENING BILAT (CPT=77067/81375); Future      Mammogram as ordered.  ENT referral- Dr. Jasnen.  START Lisinopril 10 mg daily.  Discussed medication, indication and potential side effects of medication. Monitor BP BID x 10 days--call office or send MCM with BP readings.  INCREASE Rosuvastatin to 20 mg nightly--per patient she does not need a refill of this medication and will take (2) of the 10 mg tablets to make 20 mg dose.  Labs as ordered.  DAVID Aleman called [facility]--obtained appt for patient next week 05/29/2024 with Dr. Saleem.  Continue ASA.  To ER if any chest pain, shortness of breath- pt voiced understanding.    The patient indicates understanding of these issues and agrees to the plan.

## 2024-05-31 ENCOUNTER — LAB ENCOUNTER (OUTPATIENT)
Dept: LAB | Age: 69
End: 2024-05-31
Attending: NURSE PRACTITIONER
Payer: MEDICARE

## 2024-05-31 DIAGNOSIS — D64.9 ANEMIA, UNSPECIFIED TYPE: Primary | ICD-10-CM

## 2024-05-31 DIAGNOSIS — D64.9 ANEMIA, UNSPECIFIED TYPE: ICD-10-CM

## 2024-05-31 DIAGNOSIS — E11.9 TYPE 2 DIABETES MELLITUS WITHOUT COMPLICATION, WITHOUT LONG-TERM CURRENT USE OF INSULIN (HCC): ICD-10-CM

## 2024-05-31 DIAGNOSIS — E78.00 PURE HYPERCHOLESTEROLEMIA: ICD-10-CM

## 2024-05-31 DIAGNOSIS — R94.31 NONSPECIFIC ABNORMAL ELECTROCARDIOGRAM (ECG) (EKG): ICD-10-CM

## 2024-05-31 DIAGNOSIS — R93.1 ABNORMAL ECHOCARDIOGRAM: Primary | ICD-10-CM

## 2024-05-31 DIAGNOSIS — I10 ESSENTIAL HYPERTENSION, MALIGNANT: ICD-10-CM

## 2024-05-31 DIAGNOSIS — E11.9 DIABETES MELLITUS (HCC): ICD-10-CM

## 2024-05-31 DIAGNOSIS — R06.09 DYSPNEA ON EXERTION: ICD-10-CM

## 2024-05-31 LAB
ALBUMIN SERPL-MCNC: 3.6 G/DL (ref 3.4–5)
ALBUMIN/GLOB SERPL: 0.9 {RATIO} (ref 1–2)
ALP LIVER SERPL-CCNC: 70 U/L
ALT SERPL-CCNC: 32 U/L
ANION GAP SERPL CALC-SCNC: 9 MMOL/L (ref 0–18)
AST SERPL-CCNC: 31 U/L (ref 15–37)
BASOPHILS # BLD AUTO: 0.07 X10(3) UL (ref 0–0.2)
BASOPHILS NFR BLD AUTO: 1.6 %
BILIRUB SERPL-MCNC: 0.6 MG/DL (ref 0.1–2)
BUN BLD-MCNC: 16 MG/DL (ref 9–23)
CALCIUM BLD-MCNC: 9.3 MG/DL (ref 8.5–10.1)
CHLORIDE SERPL-SCNC: 111 MMOL/L (ref 98–112)
CHOLEST SERPL-MCNC: 123 MG/DL (ref ?–200)
CO2 SERPL-SCNC: 20 MMOL/L (ref 21–32)
CREAT BLD-MCNC: 0.8 MG/DL
DEPRECATED HBV CORE AB SER IA-ACNC: 3.1 NG/ML
EGFRCR SERPLBLD CKD-EPI 2021: 80 ML/MIN/1.73M2 (ref 60–?)
EOSINOPHIL # BLD AUTO: 0.13 X10(3) UL (ref 0–0.7)
EOSINOPHIL NFR BLD AUTO: 3 %
ERYTHROCYTE [DISTWIDTH] IN BLOOD BY AUTOMATED COUNT: 18.4 %
EST. AVERAGE GLUCOSE BLD GHB EST-MCNC: 143 MG/DL (ref 68–126)
FASTING PATIENT LIPID ANSWER: YES
FASTING STATUS PATIENT QL REPORTED: YES
GLOBULIN PLAS-MCNC: 3.8 G/DL (ref 2.8–4.4)
GLUCOSE BLD-MCNC: 121 MG/DL (ref 70–99)
HBA1C MFR BLD: 6.6 % (ref ?–5.7)
HCT VFR BLD AUTO: 29.2 %
HDLC SERPL-MCNC: 50 MG/DL (ref 40–59)
HGB BLD-MCNC: 8.6 G/DL
IMM GRANULOCYTES # BLD AUTO: 0.01 X10(3) UL (ref 0–1)
IMM GRANULOCYTES NFR BLD: 0.2 %
IRON SATN MFR SERPL: 3 %
IRON SERPL-MCNC: 20 UG/DL
LDLC SERPL CALC-MCNC: 55 MG/DL (ref ?–100)
LYMPHOCYTES # BLD AUTO: 1.38 X10(3) UL (ref 1–4)
LYMPHOCYTES NFR BLD AUTO: 31.7 %
MCH RBC QN AUTO: 20.3 PG (ref 26–34)
MCHC RBC AUTO-ENTMCNC: 29.5 G/DL (ref 31–37)
MCV RBC AUTO: 68.9 FL
MONOCYTES # BLD AUTO: 0.44 X10(3) UL (ref 0.1–1)
MONOCYTES NFR BLD AUTO: 10.1 %
NEUTROPHILS # BLD AUTO: 2.32 X10 (3) UL (ref 1.5–7.7)
NEUTROPHILS # BLD AUTO: 2.32 X10(3) UL (ref 1.5–7.7)
NEUTROPHILS NFR BLD AUTO: 53.4 %
NONHDLC SERPL-MCNC: 73 MG/DL (ref ?–130)
OSMOLALITY SERPL CALC.SUM OF ELEC: 292 MOSM/KG (ref 275–295)
PLATELET # BLD AUTO: 374 10(3)UL (ref 150–450)
POTASSIUM SERPL-SCNC: 3.9 MMOL/L (ref 3.5–5.1)
PROT SERPL-MCNC: 7.4 G/DL (ref 6.4–8.2)
RBC # BLD AUTO: 4.24 X10(6)UL
SODIUM SERPL-SCNC: 140 MMOL/L (ref 136–145)
TIBC SERPL-MCNC: 659 UG/DL (ref 240–450)
TRANSFERRIN SERPL-MCNC: 442 MG/DL (ref 200–360)
TRIGL SERPL-MCNC: 94 MG/DL (ref 30–149)
TSI SER-ACNC: 0.92 MIU/ML (ref 0.36–3.74)
VLDLC SERPL CALC-MCNC: 14 MG/DL (ref 0–30)
WBC # BLD AUTO: 4.4 X10(3) UL (ref 4–11)

## 2024-05-31 PROCEDURE — 83550 IRON BINDING TEST: CPT

## 2024-05-31 PROCEDURE — 84443 ASSAY THYROID STIM HORMONE: CPT

## 2024-05-31 PROCEDURE — 82728 ASSAY OF FERRITIN: CPT

## 2024-05-31 PROCEDURE — 83540 ASSAY OF IRON: CPT

## 2024-05-31 PROCEDURE — 80061 LIPID PANEL: CPT

## 2024-05-31 PROCEDURE — 85025 COMPLETE CBC W/AUTO DIFF WBC: CPT

## 2024-05-31 PROCEDURE — 83036 HEMOGLOBIN GLYCOSYLATED A1C: CPT

## 2024-05-31 PROCEDURE — 80053 COMPREHEN METABOLIC PANEL: CPT

## 2024-06-03 ENCOUNTER — TELEPHONE (OUTPATIENT)
Dept: FAMILY MEDICINE CLINIC | Facility: CLINIC | Age: 69
End: 2024-06-03

## 2024-06-04 RX ORDER — METFORMIN HYDROCHLORIDE EXTENDED-RELEASE TABLETS 500 MG/1
500 TABLET, FILM COATED, EXTENDED RELEASE ORAL 2 TIMES DAILY WITH MEALS
COMMUNITY

## 2024-06-04 RX ORDER — ASPIRIN 81 MG/1
81 TABLET ORAL NIGHTLY
COMMUNITY

## 2024-06-04 NOTE — H&P
Seen and examined.  Agree with above.    Markedly abnormal stress test with diffuse ST depression.  Normal myocardial perfusion suggest balanced ischemia/L main disease.    Noted to have new anemia with Hgb 8.6 on pre-procedure blood work, it was normal in 2022.  This will need further evaluation after cath.    Procedure, indications, R/B/A reviewed with pt in detail.      Acceptable risk for IV conscious sedation.    TIKI Saleem MD

## 2024-06-04 NOTE — PAT NURSING NOTE
PreOp Instructions     You are scheduled for: a Cardiac Procedure     Date of Procedure: 06/06/24     Diet Instructions: Do not eat or drink anything after midnight     Medications: Take an Aspirin 81 mg tablet the day of your procedure     Diabetic Instructions: Metformin needs to be held 24 hours prior to procedure, your last dose should be the morning dose the day before procedure. Last dose Wednesday morning    Skin Prep: Shower with antibacterial soap using a clean washcloth, prior to procedure     Arrival Time: The day prior to your procedure you will receive a phone call before 6:00 pm with your arrival time. If you haven't received a phone call, please check your voicemail messages., If you did not receive a voice mail and it is after 6:00 pm, please call the nursing supervisor at [phone].    Driving After Procedure: If sedation is given, you WILL NOT be able to drive home. You will need a responsible adult  to drive you home.     Discharge Teaching: Your nurse will give you specific instructions before discharge, Most people can resume normal activities in 2-3 days, Any questions, please call the physician's office

## 2024-06-06 ENCOUNTER — HOSPITAL ENCOUNTER (OUTPATIENT)
Dept: INTERVENTIONAL RADIOLOGY/VASCULAR | Facility: HOSPITAL | Age: 69
Discharge: HOME | End: 2024-06-06
Attending: INTERNAL MEDICINE | Admitting: INTERNAL MEDICINE
Payer: MEDICARE

## 2024-06-06 ENCOUNTER — APPOINTMENT (OUTPATIENT)
Dept: GENERAL RADIOLOGY | Facility: HOSPITAL | Age: 69
End: 2024-06-06
Attending: THORACIC SURGERY (CARDIOTHORACIC VASCULAR SURGERY)
Payer: MEDICARE

## 2024-06-06 VITALS
WEIGHT: 171 LBS | HEART RATE: 86 BPM | TEMPERATURE: 97 F | BODY MASS INDEX: 25.91 KG/M2 | RESPIRATION RATE: 18 BRPM | HEIGHT: 68 IN | OXYGEN SATURATION: 99 % | DIASTOLIC BLOOD PRESSURE: 66 MMHG | SYSTOLIC BLOOD PRESSURE: 136 MMHG

## 2024-06-06 DIAGNOSIS — R07.9 CHEST PAIN: ICD-10-CM

## 2024-06-06 DIAGNOSIS — R94.39 ABNORMAL STRESS TEST: ICD-10-CM

## 2024-06-06 PROBLEM — D64.9 ANEMIA: Status: ACTIVE | Noted: 2024-06-06

## 2024-06-06 PROBLEM — R07.2 CHEST PAIN, PRECORDIAL: Status: ACTIVE | Noted: 2024-06-06

## 2024-06-06 PROBLEM — I25.110 CORONARY ARTERY DISEASE INVOLVING NATIVE CORONARY ARTERY OF NATIVE HEART WITH UNSTABLE ANGINA PECTORIS (HCC): Status: ACTIVE | Noted: 2024-06-06

## 2024-06-06 LAB
ANTIBODY SCREEN: NEGATIVE
APTT PPP: 23.5 SECONDS (ref 23–36)
ATRIAL RATE: 94 BPM
BILIRUB UR QL STRIP.AUTO: NEGATIVE
CLARITY UR REFRACT.AUTO: CLEAR
GLUCOSE BLD-MCNC: 130 MG/DL (ref 70–99)
GLUCOSE UR STRIP.AUTO-MCNC: NORMAL MG/DL
INR BLD: 1.04 (ref 0.8–1.2)
LEUKOCYTE ESTERASE UR QL STRIP.AUTO: NEGATIVE
NITRITE UR QL STRIP.AUTO: NEGATIVE
P AXIS: 37 DEGREES
P-R INTERVAL: 186 MS
PH UR STRIP.AUTO: 6 [PH] (ref 5–8)
PROT UR STRIP.AUTO-MCNC: 30 MG/DL
PROTHROMBIN TIME: 13.6 SECONDS (ref 11.6–14.8)
Q-T INTERVAL: 350 MS
QRS DURATION: 82 MS
QTC CALCULATION (BEZET): 437 MS
R AXIS: -31 DEGREES
RBC UR QL AUTO: NEGATIVE
RH BLOOD TYPE: NEGATIVE
SP GR UR STRIP.AUTO: >1.03 (ref 1–1.03)
T AXIS: -9 DEGREES
UROBILINOGEN UR STRIP.AUTO-MCNC: NORMAL MG/DL
VENTRICULAR RATE: 94 BPM

## 2024-06-06 PROCEDURE — 93005 ELECTROCARDIOGRAM TRACING: CPT

## 2024-06-06 PROCEDURE — 86850 RBC ANTIBODY SCREEN: CPT | Performed by: THORACIC SURGERY (CARDIOTHORACIC VASCULAR SURGERY)

## 2024-06-06 PROCEDURE — 93458 L HRT ARTERY/VENTRICLE ANGIO: CPT | Performed by: INTERNAL MEDICINE

## 2024-06-06 PROCEDURE — B2151ZZ FLUOROSCOPY OF LEFT HEART USING LOW OSMOLAR CONTRAST: ICD-10-PCS | Performed by: INTERNAL MEDICINE

## 2024-06-06 PROCEDURE — 81001 URINALYSIS AUTO W/SCOPE: CPT | Performed by: THORACIC SURGERY (CARDIOTHORACIC VASCULAR SURGERY)

## 2024-06-06 PROCEDURE — 71045 X-RAY EXAM CHEST 1 VIEW: CPT | Performed by: THORACIC SURGERY (CARDIOTHORACIC VASCULAR SURGERY)

## 2024-06-06 PROCEDURE — 86901 BLOOD TYPING SEROLOGIC RH(D): CPT | Performed by: THORACIC SURGERY (CARDIOTHORACIC VASCULAR SURGERY)

## 2024-06-06 PROCEDURE — 85610 PROTHROMBIN TIME: CPT | Performed by: THORACIC SURGERY (CARDIOTHORACIC VASCULAR SURGERY)

## 2024-06-06 PROCEDURE — 99152 MOD SED SAME PHYS/QHP 5/>YRS: CPT | Performed by: INTERNAL MEDICINE

## 2024-06-06 PROCEDURE — B2111ZZ FLUOROSCOPY OF MULTIPLE CORONARY ARTERIES USING LOW OSMOLAR CONTRAST: ICD-10-PCS | Performed by: INTERNAL MEDICINE

## 2024-06-06 PROCEDURE — 93010 ELECTROCARDIOGRAM REPORT: CPT | Performed by: INTERNAL MEDICINE

## 2024-06-06 PROCEDURE — 86900 BLOOD TYPING SEROLOGIC ABO: CPT | Performed by: THORACIC SURGERY (CARDIOTHORACIC VASCULAR SURGERY)

## 2024-06-06 PROCEDURE — 99153 MOD SED SAME PHYS/QHP EA: CPT | Performed by: INTERNAL MEDICINE

## 2024-06-06 PROCEDURE — 82962 GLUCOSE BLOOD TEST: CPT

## 2024-06-06 PROCEDURE — 4A023N8 MEASUREMENT OF CARDIAC SAMPLING AND PRESSURE, BILATERAL, PERCUTANEOUS APPROACH: ICD-10-PCS | Performed by: INTERNAL MEDICINE

## 2024-06-06 PROCEDURE — 85730 THROMBOPLASTIN TIME PARTIAL: CPT | Performed by: THORACIC SURGERY (CARDIOTHORACIC VASCULAR SURGERY)

## 2024-06-06 RX ORDER — HEPARIN SODIUM 5000 [USP'U]/ML
INJECTION, SOLUTION INTRAVENOUS; SUBCUTANEOUS
Status: COMPLETED
Start: 2024-06-06 | End: 2024-06-06

## 2024-06-06 RX ORDER — LIDOCAINE HYDROCHLORIDE 10 MG/ML
INJECTION, SOLUTION EPIDURAL; INFILTRATION; INTRACAUDAL; PERINEURAL
Status: COMPLETED
Start: 2024-06-06 | End: 2024-06-06

## 2024-06-06 RX ORDER — MIDAZOLAM HYDROCHLORIDE 1 MG/ML
INJECTION INTRAMUSCULAR; INTRAVENOUS
Status: COMPLETED
Start: 2024-06-06 | End: 2024-06-06

## 2024-06-06 RX ORDER — SODIUM CHLORIDE 9 MG/ML
INJECTION, SOLUTION INTRAVENOUS
Status: DISCONTINUED | OUTPATIENT
Start: 2024-06-07 | End: 2024-06-06 | Stop reason: HOSPADM

## 2024-06-06 NOTE — PROGRESS NOTES
Pt post LHC, pt awake, vss. Right femoral artery access site is soft, drsg is CDI.     Recovery complete per protocol. Vss. Pt has been sitting up in bed, voided and walked. Right fem site remains soft with no signs of bleeding or hematoma. Discharge instructions reviewed, iv dc'd and pt discharged home with daughter driving.

## 2024-06-06 NOTE — PROGRESS NOTES
Met with patient and family to discuss pre op teaching, post op expectations, discharge planning.  Discussed roles of CM/SW, PT/OT, Cardiac rehab in education and recovery.  All questions answered, binder given, pt scheduled for CABG Tuesday.  Discussed typical post op and at home recovery, pts family will be home to assist.  She is planning a cruise to [place] at the end of July, per Dr. Maynard as long a recovery is as expected will be ok to travel.  All PATs done in pre op holding.  Will follow up post op.  Priyanka Barnes RN  Clinical Coordinator  CV Surgery

## 2024-06-06 NOTE — PROGRESS NOTES
[facility]  [facility]  Preliminary Cath Note    Ilda Gutierrez Location: Cath lab     MRN [id number]   Admission Date 6/6/2024 Operation Date 6/6/2024   Attending Physician Francisco Saleem MD Operating Physician Francisco Saleem MD     Pre-Cath Diagnosis:New onset chest pain, markedly abnormal stress test.    Post-Cath Diagnosis: MV CAD.    Procedure Performed: LHC via RFA, 6 fr with mynx.   Conscious sedation: Versed 2 mg, Fentanyl 50 mics, 1126-12-1.    Summary of Case: Normal LVEF.  80% distal L main and 90% ostial DRCA stenosis.  Good distal targets.  Consult CV surgery.  D/W pt and family in detail.    Francisco Saleem MD  6/6/2024  1:26 PM

## 2024-06-06 NOTE — DISCHARGE INSTRUCTIONS
HOME CARE INSTRUCTIONS FOLLOWING CORONARY ANGIOGRAPHY,  PERIPHERAL ANGIOGRAPHY, ANGIOPLASTY (PTCA/PTA) OR INSERTION  OF STENT IN THE CORONARY, CAROTID, AND/OR PERIPHERAL ARTERIES      Activity:   DO NOT drive after the procedure. You may resume driving late the following day according to the nurse or physician’s instructions   Plan on resting and relaxing tonight and tomorrow   Resume your normal activity after 48 hours, or as instructed by your physician   Do not lift anything over 10 pounds for the next 24 hours   Avoid sexual activity for the next 24 hours   Avoid drinking alcohol for the next 24 hours   If the groin site was used, avoid repeated stair use and excessive walking for the next 24 hours   If the wrist was used, avoid bending/flexing of the wrist for the next 24 hours.       What is Normal?   A small lump at the procedure site associated with mild tenderness when touched   The procedure site may be bruised or discolored   There may be a small amount of drainage on the bandage    Special Instructions:   Drink plenty of fluids during the next 24 hours to “flush” the contrast from your system   The bandage is to remain in place for 24 hours   Keep the bandage clean and dry   DO NOT submerge the procedure site for 72 hours (no bath tubs or pools).    After 24 hours, you must remove the bandage   You should shower after removing the bandage, and wash the procedure site gently with soap and water   If you choose to wear a bandage for a few days, make sure it remains clean and dry and that it is changed daily    When you should NOTIFY YOUR PHYSICIAN:   Bleeding can occur at the procedure site - both on the outside of the skin and/or beneath the surface of the skin   Swelling or a large lump at the procedure site can occur, which may be accompanied by moderate to severe pain. If either of the above occurs, lie down flat. Have someone apply pressure to the procedure site with both hands, as instructed by the  nurse. Hold pressure for 20 minutes and the bleeding should stop. Notify your physician of the occurrence. If the bleeding does not stop, call 911 and continue to apply pressure   If you experience signs of a fever, temperature >101 degrees, chills, infection (redness, swelling, thick yellow drainage, or a foul odor from the procedure site)   If you notice any numbness, tingling, or loss of feeling to your leg or foot for groin access         Other:  - You may resume your present diet, unless otherwise specified by your physician.   - You may resume all of your medications as prescribed, unless otherwise directed by your physician. A list of your medications was provided to you at discharge.        RESUME METFORMIN ON SUNDAY JUNE 9.

## 2024-06-07 NOTE — PAT NURSING NOTE
Spoke with patient and family over the phone on 6/6 to discuss upcoming CABG with Dr. Maynard on 6/11. Patient and family verbalized understanding of instructions. Patient given surgical binder on 6/6 and met with patient in [facility] to provide antibacterial soap today. PATs completed on 6/6. Instructed to bring binder on day of surgery.    Tentative TOA 0800. [place] at [facility] registration desk. Park in [facility]. Will call patient afternoon prior to surgery to confirm arrival time. Strict fasting at 0000 night before, do not take any medications morning of surgery. STOP all over the counter vitamins/supplements/nsaids starting NOW, stop aspirin starting now (last dose was prior to angiogram on 6/6 AM), last dose of lisinopril on 6/7, last dose of metformin on 6/10 AM. No gum, candy, food or water morning of surgery. Shower with antibacterial soap x3, no jewelry, wear glasses (no contacts), no lotions, powders or ointments. Admit average of 5 nights. Roles of PT/OT discussed. Intra-op and post-op expectations discussed.Patient instructed to read through binder and call with any questions. All questions answered at this time.

## 2024-06-07 NOTE — PROCEDURES
[facility]    PATIENT'S NAME: KAYY COLEMAN   ATTENDING PHYSICIAN: Francisco Saleem M.D.   OPERATING PHYSICIAN: Francisco Saleem M.D.   PATIENT ACCOUNT#:   [id number]    LOCATION:  [address]  MEDICAL RECORD #:   [id number]       YOB: 1955  ADMISSION DATE:       06/06/2024      OPERATION DATE:  06/06/2024    CARDIAC PROCEDURE TRANSCRIPTION    CARDIAC CATHETERIZATION    PREOPERATIVE DIAGNOSIS:  New-onset exertional chest heaviness and exertional dyspnea.  Equivocal nuclear stress test.  Profound EKG changes with normal myocardial perfusion suggesting balanced ischemia.  POSTOPERATIVE DIAGNOSIS:  Severe multivessel coronary artery disease.  PROCEDURE PERFORMED:    INDICATIONS:  Evaluate hemodynamics, ventricular function, and coronary anatomy.    PROCEDURAL COMMENTS:  Left heart catheterization, left ventriculography, and selective coronary angiography were performed via the right femoral artery.  A 6-Comoran pigtail, JL4, JL3.5, JR and AR modified catheters were used for the diagnostic procedure.  Following completion of the study, the introducer sheath was removed and adequate hemostasis obtained with manual pressure to the puncture site, as well as use of a 6-Comoran Mynx arterial closure device.    IV conscious sedation for the procedure consisted of Versed 2 mg and fentanyl 50 mcg.  Conscious sedation monitoring time 1126 to 1201.  The patient tolerated the procedure well, with no immediate complications.     FLUOROSCOPY:  The coronary arteries are diffusely calcified.    HEMODYNAMICS:  The aortic pressure is 115/55, with a mean of 80.  The LVEDP is 5 to 10.  No gradient is noted across the aortic valve on pullback.    LEFT VENTRICULOGRAPHY:  Moderate ectopy is noted during the contrast injection.  Left ventricular size appears normal.  Left ventricular systolic function appears uniformly normal.  The visually estimated LVEF is 70%.  No mitral regurgitation.    CORONARY  ANGIOGRAPHY:  The JL4 catheter would not seat well in the left main coronary artery.  The JL3.5 catheter seated very nicely.  The left main is long.  There is an 80% distal left main stenosis, at the bifurcation of the left main into left anterior descending and circumflex coronary distributions.  The left main is a congenitally rather small vessel which reaches approximately two-thirds of the way down the anterior left ventricle.  It appears free of disease throughout its length.  The circumflex coronary artery gives rise to a large proximal first bifurcating obtuse marginal vessel before continuing as a rudimentary AV groove circumflex vessel.    The aorto-ostium of the right coronary artery appears calcified.  The JR4 catheter did seat in the RCA, with significant pressure dampening.  This was exchanged for an AR mod, which sat just at the ostium.  There was a 90% ostial stenosis of the RCA noted.  This is a large, dominant artery which gives rise to the PDA as well as several large posterior left ventricular branches.  The PDA wraps around and supplies the distal left ventricular apex.    CONCLUSIONS:    1.   Normal hemodynamics.  2.   Normal left ventricular systolic function.  3.   Severe distal left main and ostial right coronary artery disease as described above.    RECOMMENDATIONS:  Anatomy is amenable only to surgical revascularization.  CV surgery consultation has been obtained with Dr. Jp Maynard for consideration of this procedure.     Dictated By Francisco Saleem M.D.  d: 06/06/2024 17:52:33  t: 06/06/2024 19:58:17  Job [id number]  SC/    cc: MD Olivia Anderson APN

## 2024-06-10 ENCOUNTER — ANESTHESIA EVENT (OUTPATIENT)
Dept: CARDIAC SURGERY | Facility: HOSPITAL | Age: 69
DRG: 236 | End: 2024-06-10
Payer: MEDICARE

## 2024-06-10 NOTE — ANESTHESIA PREPROCEDURE EVALUATION
PRE-OP EVALUATION    Patient Name: Ilda Gutierrez    Admit Diagnosis: CORONARY ARTERY DISEASE    Pre-op Diagnosis: CORONARY ARTERY DISEASE    CORONARY ARTERY BYPASS GRAFT    Anesthesia Procedure: CORONARY ARTERY BYPASS GRAFT    Surgeons and Role:     * Jp Maynard MD - Primary    Pre-op vitals reviewed.        Body mass index is 25.85 kg/m².    Current medications reviewed.  Hospital Medications:   [START ON 6/11/2024] EPINEPHrine (Adrenalin) 5 mg in sodium chloride 0.9% 250 mL infusion  1-10 mcg/min Intravenous PRN    [START ON 6/11/2024] insulin regular human (Novolin R, Humulin R) 100 Units in sodium chloride 0.9% 100 mL standard infusion (100 mL)  1-40 Units/hr Intravenous PRN    [COMPLETED] lidocaine PF (Xylocaine-MPF) 1 % injection        [COMPLETED] heparin (Porcine) 5000 UNIT/ML injection        [COMPLETED] fentaNYL (Sublimaze) 50 mcg/mL injection        [COMPLETED] midazolam (Versed) 2 MG/2ML injection        [COMPLETED] iohexol (OMNIPAQUE) 350 MG/ML injection 150 mL  150 mL Intravascular ONCE PRN       Outpatient Medications:     No medications prior to admission.       Allergies: Patient has no known allergies.      Anesthesia Evaluation  Patient Active Problem List   Diagnosis    Type 2 diabetes mellitus without complication, without long-term current use of insulin (HCC)    Hypercholesterolemia    Chest pain, precordial    Abnormal stress test    Anemia    Coronary artery disease involving native coronary artery of native heart with unstable angina pectoris (HCC)        Past Medical History:    Coronary atherosclerosis    Diabetes (HCC)    High blood pressure    High cholesterol    Hyperlipidemia        Past Surgical History:   Procedure Laterality Date    Spine surgery procedure unlisted       Social History     Socioeconomic History    Marital status:    Tobacco Use    Smoking status: Never    Smokeless tobacco: Never   Vaping Use    Vaping status: Never Used   Substance and Sexual Activity     Alcohol use: Yes     Comment: OCCASIONAL    Drug use: Never     History   Drug Use Unknown     Available pre-op labs reviewed.  Lab Results   Component Value Date    WBC 4.4 05/31/2024    RBC 4.24 05/31/2024    HGB 8.6 (L) 05/31/2024    HCT 29.2 (L) 05/31/2024    MCV 68.9 (L) 05/31/2024    MCH 20.3 (L) 05/31/2024    MCHC 29.5 (L) 05/31/2024    RDW 18.4 05/31/2024    .0 05/31/2024     Lab Results   Component Value Date     05/31/2024    K 3.9 05/31/2024     05/31/2024    CO2 20.0 (L) 05/31/2024    BUN 16 05/31/2024    CREATSERUM 0.80 05/31/2024     (H) 05/31/2024    CA 9.3 05/31/2024     Lab Results   Component Value Date    INR 1.04 06/06/2024         Airway      Mallampati: II  Mouth opening: >3 FB  TM distance: 4 - 6 cm  Neck ROM: full Cardiovascular      Rhythm: regular  Rate: normal     Dental    Dentition appears grossly intact         Pulmonary    Pulmonary exam normal.  Breath sounds clear to auscultation bilaterally.               Other findings              ASA: 4   Plan: general  NPO status verified and     Post-procedure pain management plan discussed with surgeon and patient.    Comment: We discussed GA w/ETT and postoperative ventilation and CCU admission.  We discussed placement of additional lines including arterial catheter, additional PIVs, central venous catheter/PAC and intraop AKANKSHA.  Patient understands extubation will occur once the overall hemodynamic status is stable. Discussed rare risk of dental trauma from intubation, sore throat, and postop nausea and vomiting.  Discussed possible use of blood products. We discussed postoperative usage of sedatives, analgesics and anxiolytics.  All questions re: anesthetic management were answered.        Plan/risks discussed with: patient  Use of blood product(s) discussed with: patient    Consented to blood products.          Present on Admission:  **None**

## 2024-06-11 ENCOUNTER — ANESTHESIA (OUTPATIENT)
Dept: CARDIAC SURGERY | Facility: HOSPITAL | Age: 69
DRG: 236 | End: 2024-06-11
Payer: MEDICARE

## 2024-06-11 ENCOUNTER — HOSPITAL ENCOUNTER (INPATIENT)
Facility: HOSPITAL | Age: 69
LOS: 4 days | Discharge: HOME HEALTH | DRG: 236 | End: 2024-06-15
Attending: THORACIC SURGERY (CARDIOTHORACIC VASCULAR SURGERY) | Admitting: THORACIC SURGERY (CARDIOTHORACIC VASCULAR SURGERY)

## 2024-06-11 ENCOUNTER — APPOINTMENT (OUTPATIENT)
Dept: GENERAL RADIOLOGY | Facility: HOSPITAL | Age: 69
DRG: 236 | End: 2024-06-11
Attending: PHYSICIAN ASSISTANT

## 2024-06-11 ENCOUNTER — MED REC SCAN ONLY (OUTPATIENT)
Dept: FAMILY MEDICINE CLINIC | Facility: CLINIC | Age: 69
End: 2024-06-11

## 2024-06-11 DIAGNOSIS — Z95.1 S/P CABG X 3: ICD-10-CM

## 2024-06-11 DIAGNOSIS — I48.0 PAF (PAROXYSMAL ATRIAL FIBRILLATION) (HCC): ICD-10-CM

## 2024-06-11 DIAGNOSIS — G89.18 POST-OP PAIN: ICD-10-CM

## 2024-06-11 DIAGNOSIS — J90 PLEURAL EFFUSION: Primary | ICD-10-CM

## 2024-06-11 PROBLEM — I25.10 CAD (CORONARY ARTERY DISEASE): Status: ACTIVE | Noted: 2024-06-11

## 2024-06-11 LAB
APTT PPP: 23.7 SECONDS (ref 23–36)
BASE EXCESS BLD CALC-SCNC: -6 MMOL/L
BASE EXCESS BLD CALC-SCNC: 0 MMOL/L
BASE EXCESS BLDA CALC-SCNC: -2 MMOL/L (ref ?–30)
BASE EXCESS BLDA CALC-SCNC: 1.4 MMOL/L (ref ?–2)
BASE EXCESS BLDV CALC-SCNC: -2 MMOL/L (ref ?–30)
BASE EXCESS BLDV CALC-SCNC: -4 MMOL/L (ref ?–30)
BASE EXCESS BLDV CALC-SCNC: -7 MMOL/L (ref ?–30)
BILIRUB UR QL STRIP.AUTO: NEGATIVE
BLOOD TYPE BARCODE: 600
BODY TEMPERATURE: 98.6 F
BUN BLD-MCNC: 12 MG/DL (ref 9–23)
CA-I BLD-SCNC: 1.22 MMOL/L (ref 1.12–1.32)
CA-I BLD-SCNC: 1.29 MMOL/L (ref 1.12–1.32)
CA-I BLDA-SCNC: 1.19 MMOL/L (ref 1.12–1.32)
CA-I BLDV-SCNC: 1.09 MMOL/L (ref 1.12–1.32)
CA-I BLDV-SCNC: 1.1 MMOL/L (ref 1.12–1.32)
CA-I BLDV-SCNC: 1.1 MMOL/L (ref 1.12–1.32)
CALCIUM BLD-MCNC: 8.3 MG/DL (ref 8.5–10.1)
CHLORIDE SERPL-SCNC: 116 MMOL/L (ref 98–112)
CO2 BLD-SCNC: 20 MMOL/L (ref 22–32)
CO2 BLD-SCNC: 25 MMOL/L (ref 22–32)
CO2 BLDA-SCNC: 23 MMOL/L (ref 22–32)
CO2 BLDV-SCNC: 21 MMOL/L (ref 22–32)
CO2 BLDV-SCNC: 24 MMOL/L (ref 22–32)
CO2 BLDV-SCNC: 25 MMOL/L (ref 22–32)
CO2 SERPL-SCNC: 23 MMOL/L (ref 21–32)
COHGB MFR BLD: 0.9 % SAT (ref 0–3)
COLOR UR AUTO: YELLOW
CREAT BLD-MCNC: 0.68 MG/DL
EGFRCR SERPLBLD CKD-EPI 2021: 94 ML/MIN/1.73M2 (ref 60–?)
ERYTHROCYTE [DISTWIDTH] IN BLOOD BY AUTOMATED COUNT: 20.3 %
FIBRINOGEN PPP-MCNC: 282 MG/DL (ref 200–480)
FIO2: 40 %
GLUCOSE BLD-MCNC: 115 MG/DL (ref 70–99)
GLUCOSE BLD-MCNC: 121 MG/DL (ref 70–99)
GLUCOSE BLD-MCNC: 121 MG/DL (ref 70–99)
GLUCOSE BLD-MCNC: 129 MG/DL (ref 70–99)
GLUCOSE BLD-MCNC: 139 MG/DL (ref 70–99)
GLUCOSE BLD-MCNC: 139 MG/DL (ref 70–99)
GLUCOSE BLD-MCNC: 141 MG/DL (ref 70–99)
GLUCOSE BLD-MCNC: 145 MG/DL (ref 70–99)
GLUCOSE BLD-MCNC: 154 MG/DL (ref 70–99)
GLUCOSE BLD-MCNC: 162 MG/DL (ref 70–99)
GLUCOSE BLD-MCNC: 163 MG/DL (ref 70–99)
GLUCOSE BLD-MCNC: 174 MG/DL (ref 70–99)
GLUCOSE BLD-MCNC: 206 MG/DL (ref 70–99)
GLUCOSE BLDV-MCNC: 203 MG/DL (ref 70–99)
GLUCOSE BLDV-MCNC: 237 MG/DL (ref 70–99)
GLUCOSE BLDV-MCNC: 249 MG/DL (ref 70–99)
GLUCOSE UR STRIP.AUTO-MCNC: NORMAL MG/DL
HCO3 BLD-SCNC: 19.4 MEQ/L
HCO3 BLD-SCNC: 23.7 MEQ/L
HCO3 BLDA-SCNC: 19.3 MEQ/L (ref 22–26)
HCO3 BLDA-SCNC: 21.8 MEQ/L (ref 22–26)
HCO3 BLDA-SCNC: 22.2 MEQ/L (ref 22–26)
HCO3 BLDA-SCNC: 24 MEQ/L (ref 22–26)
HCO3 BLDA-SCNC: 26 MEQ/L (ref 21–27)
HCT VFR BLD AUTO: 30.3 %
HCT VFR BLD CALC: 22 %
HCT VFR BLD CALC: 22 %
HCT VFR BLDA CALC: 28 %
HCT VFR BLDV CALC: 17 %
HCT VFR BLDV CALC: 22 %
HCT VFR BLDV CALC: 23 %
HGB BLD-MCNC: 10.4 G/DL
HGB BLD-MCNC: 9.6 G/DL
HYALINE CASTS #/AREA URNS AUTO: PRESENT /LPF
INR BLD: 1.27 (ref 0.8–1.2)
ISTAT ACTIVATED CLOTTING TIME: 125 SECONDS (ref 74–137)
ISTAT ACTIVATED CLOTTING TIME: 147 SECONDS (ref 74–137)
ISTAT ACTIVATED CLOTTING TIME: 287 SECONDS (ref 74–137)
ISTAT ACTIVATED CLOTTING TIME: 455 SECONDS (ref 74–137)
ISTAT ACTIVATED CLOTTING TIME: 482 SECONDS (ref 74–137)
ISTAT ACTIVATED CLOTTING TIME: 536 SECONDS (ref 74–137)
ISTAT PATIENT TEMPERATURE: 32 DEGREE
ISTAT PATIENT TEMPERATURE: 32 DEGREE
KETONES UR STRIP.AUTO-MCNC: 10 MG/DL
LEUKOCYTE ESTERASE UR QL STRIP.AUTO: NEGATIVE
MAGNESIUM SERPL-MCNC: 2.5 MG/DL (ref 1.6–2.6)
MCH RBC QN AUTO: 22 PG (ref 26–34)
MCHC RBC AUTO-ENTMCNC: 31.7 G/DL (ref 31–37)
MCV RBC AUTO: 69.3 FL
METHGB MFR BLD: 0 % SAT (ref 0.4–1.5)
NITRITE UR QL STRIP.AUTO: NEGATIVE
OXYHGB MFR BLDA: 97.8 % (ref 92–100)
PCO2 BLD: 33.7 MMHG
PCO2 BLD: 34.6 MMHG
PCO2 BLDA: 32 MM HG (ref 35–45)
PCO2 BLDA: 32 MMHG (ref 35–45)
PCO2 BLDV: 32.8 MMHG (ref 38–50)
PCO2 BLDV: 35.5 MMHG (ref 38–50)
PCO2 BLDV: 46 MMHG (ref 38–50)
PEEP: 5 CM H2O
PH BLD: 7.37 [PH]
PH BLD: 7.44 [PH]
PH BLDA: 7.44 [PH] (ref 7.35–7.45)
PH BLDA: 7.49 [PH] (ref 7.35–7.45)
PH BLDV: 7.33 [PH] (ref 7.32–7.43)
PH BLDV: 7.35 [PH] (ref 7.32–7.43)
PH BLDV: 7.38 [PH] (ref 7.32–7.43)
PH UR STRIP.AUTO: 5.5 [PH] (ref 5–8)
PLATELET # BLD AUTO: 234 10(3)UL (ref 150–450)
PO2 BLD: 161 MMHG
PO2 BLD: 293 MMHG
PO2 BLDA: 148 MM HG (ref 80–100)
PO2 BLDA: 88 MMHG (ref 80–105)
PO2 BLDV: <70 MMHG (ref 35–40)
POTASSIUM BLD-SCNC: 3.9 MMOL/L (ref 3.6–5.1)
POTASSIUM BLD-SCNC: 5.4 MMOL/L (ref 3.6–5.1)
POTASSIUM SERPL-SCNC: 3.7 MMOL/L (ref 3.5–5.1)
PRESSURE SUPPORT: 5 CM H2O
PROTHROMBIN TIME: 16 SECONDS (ref 11.6–14.8)
RBC # BLD AUTO: 4.37 X10(6)UL
RBC UR QL AUTO: NEGATIVE
RH BLOOD TYPE: NEGATIVE
SAO2 % BLD: 100 %
SAO2 % BLD: 100 %
SAO2 % BLDA: 97 % (ref 92–100)
SAO2 % BLDV: 70 % (ref 60–85)
SAO2 % BLDV: 77 % (ref 60–85)
SAO2 % BLDV: 86 % (ref 60–85)
SODIUM BLD-SCNC: 141 MMOL/L (ref 136–145)
SODIUM BLD-SCNC: 141 MMOL/L (ref 136–145)
SODIUM BLDA-SCNC: 144 MMOL/L (ref 136–145)
SODIUM BLDA-SCNC: 3.7 MMOL/L (ref 3.6–5.1)
SODIUM BLDV-SCNC: 132 MMOL/L (ref 136–145)
SODIUM BLDV-SCNC: 134 MMOL/L (ref 136–145)
SODIUM BLDV-SCNC: 137 MMOL/L (ref 136–145)
SODIUM BLDV-SCNC: 6.5 MMOL/L (ref 3.6–5.1)
SODIUM BLDV-SCNC: 7.4 MMOL/L (ref 3.6–5.1)
SODIUM BLDV-SCNC: 7.5 MMOL/L (ref 3.6–5.1)
SODIUM SERPL-SCNC: 145 MMOL/L (ref 136–145)
SP GR UR STRIP.AUTO: 1.02 (ref 1–1.03)
UNIT VOLUME: 350 ML
UROBILINOGEN UR STRIP.AUTO-MCNC: NORMAL MG/DL
WBC # BLD AUTO: 10.2 X10(3) UL (ref 4–11)
WBC CLUMPS UR QL AUTO: PRESENT /HPF

## 2024-06-11 PROCEDURE — 71045 X-RAY EXAM CHEST 1 VIEW: CPT | Performed by: PHYSICIAN ASSISTANT

## 2024-06-11 PROCEDURE — 06BQ4ZZ EXCISION OF LEFT SAPHENOUS VEIN, PERCUTANEOUS ENDOSCOPIC APPROACH: ICD-10-PCS | Performed by: THORACIC SURGERY (CARDIOTHORACIC VASCULAR SURGERY)

## 2024-06-11 PROCEDURE — 99222 1ST HOSP IP/OBS MODERATE 55: CPT | Performed by: INTERNAL MEDICINE

## 2024-06-11 PROCEDURE — 36430 TRANSFUSION BLD/BLD COMPNT: CPT | Performed by: STUDENT IN AN ORGANIZED HEALTH CARE EDUCATION/TRAINING PROGRAM

## 2024-06-11 PROCEDURE — 30233N1 TRANSFUSION OF NONAUTOLOGOUS RED BLOOD CELLS INTO PERIPHERAL VEIN, PERCUTANEOUS APPROACH: ICD-10-PCS | Performed by: STUDENT IN AN ORGANIZED HEALTH CARE EDUCATION/TRAINING PROGRAM

## 2024-06-11 PROCEDURE — S0028 INJECTION, FAMOTIDINE, 20 MG: HCPCS | Performed by: STUDENT IN AN ORGANIZED HEALTH CARE EDUCATION/TRAINING PROGRAM

## 2024-06-11 PROCEDURE — 02100Z9 BYPASS CORONARY ARTERY, ONE ARTERY FROM LEFT INTERNAL MAMMARY, OPEN APPROACH: ICD-10-PCS | Performed by: THORACIC SURGERY (CARDIOTHORACIC VASCULAR SURGERY)

## 2024-06-11 PROCEDURE — 93312 ECHO TRANSESOPHAGEAL: CPT | Performed by: STUDENT IN AN ORGANIZED HEALTH CARE EDUCATION/TRAINING PROGRAM

## 2024-06-11 PROCEDURE — 5A1221Z PERFORMANCE OF CARDIAC OUTPUT, CONTINUOUS: ICD-10-PCS | Performed by: THORACIC SURGERY (CARDIOTHORACIC VASCULAR SURGERY)

## 2024-06-11 PROCEDURE — 021109W BYPASS CORONARY ARTERY, TWO ARTERIES FROM AORTA WITH AUTOLOGOUS VENOUS TISSUE, OPEN APPROACH: ICD-10-PCS | Performed by: THORACIC SURGERY (CARDIOTHORACIC VASCULAR SURGERY)

## 2024-06-11 RX ORDER — DIPHENHYDRAMINE HYDROCHLORIDE 50 MG/ML
12.5 INJECTION INTRAMUSCULAR; INTRAVENOUS EVERY 4 HOURS PRN
Status: DISCONTINUED | OUTPATIENT
Start: 2024-06-11 | End: 2024-06-12

## 2024-06-11 RX ORDER — ROCURONIUM BROMIDE 10 MG/ML
INJECTION, SOLUTION INTRAVENOUS AS NEEDED
Status: DISCONTINUED | OUTPATIENT
Start: 2024-06-11 | End: 2024-06-11 | Stop reason: SURG

## 2024-06-11 RX ORDER — NALOXONE HYDROCHLORIDE 0.4 MG/ML
0.08 INJECTION, SOLUTION INTRAMUSCULAR; INTRAVENOUS; SUBCUTANEOUS
Status: DISCONTINUED | OUTPATIENT
Start: 2024-06-11 | End: 2024-06-12

## 2024-06-11 RX ORDER — NICOTINE POLACRILEX 4 MG
15 LOZENGE BUCCAL
Status: DISCONTINUED | OUTPATIENT
Start: 2024-06-11 | End: 2024-06-15

## 2024-06-11 RX ORDER — FAMOTIDINE 10 MG/ML
INJECTION, SOLUTION INTRAVENOUS AS NEEDED
Status: DISCONTINUED | OUTPATIENT
Start: 2024-06-11 | End: 2024-06-11 | Stop reason: SURG

## 2024-06-11 RX ORDER — DEXMEDETOMIDINE HYDROCHLORIDE 4 UG/ML
INJECTION, SOLUTION INTRAVENOUS CONTINUOUS PRN
Status: DISCONTINUED | OUTPATIENT
Start: 2024-06-11 | End: 2024-06-11 | Stop reason: SURG

## 2024-06-11 RX ORDER — POLYETHYLENE GLYCOL 3350 17 G/17G
17 POWDER, FOR SOLUTION ORAL DAILY PRN
Status: DISCONTINUED | OUTPATIENT
Start: 2024-06-11 | End: 2024-06-15

## 2024-06-11 RX ORDER — NITROGLYCERIN 20 MG/100ML
INJECTION INTRAVENOUS CONTINUOUS PRN
Status: DISCONTINUED | OUTPATIENT
Start: 2024-06-11 | End: 2024-06-13

## 2024-06-11 RX ORDER — DEXAMETHASONE SODIUM PHOSPHATE 4 MG/ML
VIAL (ML) INJECTION AS NEEDED
Status: DISCONTINUED | OUTPATIENT
Start: 2024-06-11 | End: 2024-06-11 | Stop reason: SURG

## 2024-06-11 RX ORDER — MAGNESIUM SULFATE 1 G/100ML
1 INJECTION INTRAVENOUS AS NEEDED
Status: DISCONTINUED | OUTPATIENT
Start: 2024-06-11 | End: 2024-06-13

## 2024-06-11 RX ORDER — ACETAMINOPHEN 10 MG/ML
1000 INJECTION, SOLUTION INTRAVENOUS EVERY 6 HOURS
Qty: 600 ML | Refills: 0 | Status: DISCONTINUED | OUTPATIENT
Start: 2024-06-11 | End: 2024-06-12

## 2024-06-11 RX ORDER — SODIUM CHLORIDE 9 MG/ML
INJECTION, SOLUTION INTRAVENOUS CONTINUOUS
Status: DISCONTINUED | OUTPATIENT
Start: 2024-06-11 | End: 2024-06-13

## 2024-06-11 RX ORDER — ALBUMIN, HUMAN INJ 5% 5 %
12.5 SOLUTION INTRAVENOUS ONCE AS NEEDED
Status: DISPENSED | OUTPATIENT
Start: 2024-06-11 | End: 2024-06-12

## 2024-06-11 RX ORDER — PANTOPRAZOLE SODIUM 40 MG/1
40 TABLET, DELAYED RELEASE ORAL
Status: DISCONTINUED | OUTPATIENT
Start: 2024-06-12 | End: 2024-06-15

## 2024-06-11 RX ORDER — PROTAMINE SULFATE 10 MG/ML
INJECTION, SOLUTION INTRAVENOUS AS NEEDED
Status: DISCONTINUED | OUTPATIENT
Start: 2024-06-11 | End: 2024-06-11 | Stop reason: SURG

## 2024-06-11 RX ORDER — MIDAZOLAM HYDROCHLORIDE 1 MG/ML
INJECTION INTRAMUSCULAR; INTRAVENOUS AS NEEDED
Status: DISCONTINUED | OUTPATIENT
Start: 2024-06-11 | End: 2024-06-11 | Stop reason: SURG

## 2024-06-11 RX ORDER — HEPARIN SODIUM 1000 [USP'U]/ML
INJECTION, SOLUTION INTRAVENOUS; SUBCUTANEOUS AS NEEDED
Status: DISCONTINUED | OUTPATIENT
Start: 2024-06-11 | End: 2024-06-11 | Stop reason: SURG

## 2024-06-11 RX ORDER — DEXMEDETOMIDINE HYDROCHLORIDE 4 UG/ML
INJECTION, SOLUTION INTRAVENOUS CONTINUOUS
Status: DISCONTINUED | OUTPATIENT
Start: 2024-06-11 | End: 2024-06-11

## 2024-06-11 RX ORDER — POTASSIUM CHLORIDE 14.9 MG/ML
20 INJECTION INTRAVENOUS AS NEEDED
Status: DISCONTINUED | OUTPATIENT
Start: 2024-06-11 | End: 2024-06-13

## 2024-06-11 RX ORDER — IPRATROPIUM BROMIDE AND ALBUTEROL SULFATE 2.5; .5 MG/3ML; MG/3ML
3 SOLUTION RESPIRATORY (INHALATION) EVERY 4 HOURS PRN
Status: DISCONTINUED | OUTPATIENT
Start: 2024-06-11 | End: 2024-06-11

## 2024-06-11 RX ORDER — NICOTINE POLACRILEX 4 MG
30 LOZENGE BUCCAL
Status: DISCONTINUED | OUTPATIENT
Start: 2024-06-11 | End: 2024-06-15

## 2024-06-11 RX ORDER — BISACODYL 10 MG
10 SUPPOSITORY, RECTAL RECTAL
Status: DISCONTINUED | OUTPATIENT
Start: 2024-06-11 | End: 2024-06-15

## 2024-06-11 RX ORDER — POTASSIUM CHLORIDE 29.8 MG/ML
40 INJECTION INTRAVENOUS AS NEEDED
Status: DISCONTINUED | OUTPATIENT
Start: 2024-06-11 | End: 2024-06-13

## 2024-06-11 RX ORDER — SODIUM CHLORIDE 9 MG/ML
INJECTION, SOLUTION INTRAVENOUS CONTINUOUS
Status: DISCONTINUED | OUTPATIENT
Start: 2024-06-11 | End: 2024-06-12

## 2024-06-11 RX ORDER — ONDANSETRON 2 MG/ML
4 INJECTION INTRAMUSCULAR; INTRAVENOUS EVERY 6 HOURS PRN
Status: DISCONTINUED | OUTPATIENT
Start: 2024-06-11 | End: 2024-06-15

## 2024-06-11 RX ORDER — MELATONIN
3 NIGHTLY PRN
Status: DISCONTINUED | OUTPATIENT
Start: 2024-06-11 | End: 2024-06-15

## 2024-06-11 RX ORDER — SODIUM CHLORIDE 9 MG/ML
INJECTION, SOLUTION INTRAVENOUS CONTINUOUS PRN
Status: DISCONTINUED | OUTPATIENT
Start: 2024-06-11 | End: 2024-06-11 | Stop reason: SURG

## 2024-06-11 RX ORDER — DEXTROSE MONOHYDRATE AND SODIUM CHLORIDE 5; .45 G/100ML; G/100ML
INJECTION, SOLUTION INTRAVENOUS CONTINUOUS
Status: DISCONTINUED | OUTPATIENT
Start: 2024-06-11 | End: 2024-06-13

## 2024-06-11 RX ORDER — ONDANSETRON 2 MG/ML
4 INJECTION INTRAMUSCULAR; INTRAVENOUS EVERY 6 HOURS PRN
Status: DISCONTINUED | OUTPATIENT
Start: 2024-06-11 | End: 2024-06-11

## 2024-06-11 RX ORDER — LIDOCAINE HYDROCHLORIDE 10 MG/ML
INJECTION, SOLUTION EPIDURAL; INFILTRATION; INTRACAUDAL; PERINEURAL AS NEEDED
Status: DISCONTINUED | OUTPATIENT
Start: 2024-06-11 | End: 2024-06-11 | Stop reason: SURG

## 2024-06-11 RX ORDER — MORPHINE SULFATE 4 MG/ML
4 INJECTION, SOLUTION INTRAMUSCULAR; INTRAVENOUS EVERY 2 HOUR PRN
Status: DISCONTINUED | OUTPATIENT
Start: 2024-06-11 | End: 2024-06-15

## 2024-06-11 RX ORDER — MAGNESIUM SULFATE HEPTAHYDRATE 40 MG/ML
2 INJECTION, SOLUTION INTRAVENOUS AS NEEDED
Status: DISCONTINUED | OUTPATIENT
Start: 2024-06-11 | End: 2024-06-13

## 2024-06-11 RX ORDER — ASPIRIN 81 MG/1
81 TABLET ORAL DAILY
Status: DISCONTINUED | OUTPATIENT
Start: 2024-06-12 | End: 2024-06-15

## 2024-06-11 RX ORDER — ROSUVASTATIN CALCIUM 10 MG/1
10 TABLET, COATED ORAL NIGHTLY
Status: DISCONTINUED | OUTPATIENT
Start: 2024-06-11 | End: 2024-06-15

## 2024-06-11 RX ORDER — DOBUTAMINE HYDROCHLORIDE 200 MG/100ML
INJECTION INTRAVENOUS CONTINUOUS PRN
Status: DISCONTINUED | OUTPATIENT
Start: 2024-06-11 | End: 2024-06-13

## 2024-06-11 RX ORDER — DEXTROSE MONOHYDRATE 25 G/50ML
50 INJECTION, SOLUTION INTRAVENOUS
Status: DISCONTINUED | OUTPATIENT
Start: 2024-06-11 | End: 2024-06-15

## 2024-06-11 RX ORDER — MORPHINE SULFATE 2 MG/ML
2 INJECTION, SOLUTION INTRAMUSCULAR; INTRAVENOUS EVERY 2 HOUR PRN
Status: DISCONTINUED | OUTPATIENT
Start: 2024-06-11 | End: 2024-06-15

## 2024-06-11 RX ORDER — CHLORHEXIDINE GLUCONATE ORAL RINSE 1.2 MG/ML
15 SOLUTION DENTAL
Status: DISCONTINUED | OUTPATIENT
Start: 2024-06-11 | End: 2024-06-11

## 2024-06-11 RX ORDER — DOBUTAMINE HYDROCHLORIDE 100 MG/100ML
INJECTION INTRAVENOUS CONTINUOUS PRN
Status: DISCONTINUED | OUTPATIENT
Start: 2024-06-11 | End: 2024-06-11 | Stop reason: SURG

## 2024-06-11 RX ORDER — DIPHENHYDRAMINE HYDROCHLORIDE 50 MG/ML
INJECTION INTRAMUSCULAR; INTRAVENOUS AS NEEDED
Status: DISCONTINUED | OUTPATIENT
Start: 2024-06-11 | End: 2024-06-11 | Stop reason: SURG

## 2024-06-11 RX ORDER — DOCUSATE SODIUM 100 MG/1
100 CAPSULE, LIQUID FILLED ORAL 2 TIMES DAILY
Status: DISCONTINUED | OUTPATIENT
Start: 2024-06-11 | End: 2024-06-15

## 2024-06-11 RX ORDER — MIDAZOLAM HYDROCHLORIDE 1 MG/ML
1 INJECTION INTRAMUSCULAR; INTRAVENOUS EVERY 30 MIN PRN
Status: DISCONTINUED | OUTPATIENT
Start: 2024-06-11 | End: 2024-06-11

## 2024-06-11 RX ORDER — SENNOSIDES 8.6 MG
8.6 TABLET ORAL 2 TIMES DAILY
Status: DISCONTINUED | OUTPATIENT
Start: 2024-06-11 | End: 2024-06-15

## 2024-06-11 RX ADMIN — HEPARIN SODIUM 5000 UNITS: 1000 INJECTION, SOLUTION INTRAVENOUS; SUBCUTANEOUS at 10:28:00

## 2024-06-11 RX ADMIN — SODIUM CHLORIDE: 9 INJECTION, SOLUTION INTRAVENOUS at 09:03:00

## 2024-06-11 RX ADMIN — MIDAZOLAM HYDROCHLORIDE 2 MG: 1 INJECTION INTRAMUSCULAR; INTRAVENOUS at 09:07:00

## 2024-06-11 RX ADMIN — MIDAZOLAM HYDROCHLORIDE 2 MG: 1 INJECTION INTRAMUSCULAR; INTRAVENOUS at 10:55:00

## 2024-06-11 RX ADMIN — DOBUTAMINE HYDROCHLORIDE 2 MCG/KG/MIN: 100 INJECTION INTRAVENOUS at 12:23:00

## 2024-06-11 RX ADMIN — ROCURONIUM BROMIDE 50 MG: 10 INJECTION, SOLUTION INTRAVENOUS at 10:55:00

## 2024-06-11 RX ADMIN — DEXAMETHASONE SODIUM PHOSPHATE 8 MG: 4 MG/ML VIAL (ML) INJECTION at 09:32:00

## 2024-06-11 RX ADMIN — SODIUM CHLORIDE: 9 INJECTION, SOLUTION INTRAVENOUS at 09:28:00

## 2024-06-11 RX ADMIN — SODIUM CHLORIDE: 9 INJECTION, SOLUTION INTRAVENOUS at 10:58:00

## 2024-06-11 RX ADMIN — ROCURONIUM BROMIDE 50 MG: 10 INJECTION, SOLUTION INTRAVENOUS at 11:57:00

## 2024-06-11 RX ADMIN — FAMOTIDINE 20 MG: 10 INJECTION, SOLUTION INTRAVENOUS at 09:35:00

## 2024-06-11 RX ADMIN — MIDAZOLAM HYDROCHLORIDE 6 MG: 1 INJECTION INTRAMUSCULAR; INTRAVENOUS at 09:11:00

## 2024-06-11 RX ADMIN — DIPHENHYDRAMINE HYDROCHLORIDE 50 MG: 50 INJECTION INTRAMUSCULAR; INTRAVENOUS at 09:34:00

## 2024-06-11 RX ADMIN — DEXMEDETOMIDINE HYDROCHLORIDE 0.5 MCG/KG/HR: 4 INJECTION, SOLUTION INTRAVENOUS at 10:55:00

## 2024-06-11 RX ADMIN — SODIUM CHLORIDE: 9 INJECTION, SOLUTION INTRAVENOUS at 13:21:00

## 2024-06-11 RX ADMIN — DOBUTAMINE HYDROCHLORIDE 3 MCG/KG/MIN: 100 INJECTION INTRAVENOUS at 12:16:00

## 2024-06-11 RX ADMIN — HEPARIN SODIUM 18000 UNITS: 1000 INJECTION, SOLUTION INTRAVENOUS; SUBCUTANEOUS at 10:41:00

## 2024-06-11 RX ADMIN — PROTAMINE SULFATE 230 MG: 10 INJECTION, SOLUTION INTRAVENOUS at 12:30:00

## 2024-06-11 RX ADMIN — LIDOCAINE HYDROCHLORIDE 20 MG: 10 INJECTION, SOLUTION EPIDURAL; INFILTRATION; INTRACAUDAL; PERINEURAL at 09:11:00

## 2024-06-11 RX ADMIN — PROTAMINE SULFATE 20 MG: 10 INJECTION, SOLUTION INTRAVENOUS at 12:27:00

## 2024-06-11 RX ADMIN — ROCURONIUM BROMIDE 100 MG: 10 INJECTION, SOLUTION INTRAVENOUS at 09:11:00

## 2024-06-11 NOTE — ANESTHESIA PROCEDURE NOTES
Arterial Line    Performed by: Homero Head MD  Authorized by: Homero Head MD    General Information and Staff    Procedure Start:   Procedure End:  6/11/2024 9:16 AM  Anesthesiologist:  Homero Head MD  Performed By:  Anesthesiologist  Patient Location:  OR  Indication: continuous blood pressure monitoring    Site Identification: real time ultrasound guided and image stored and retrievable    Preanesthetic Checklist: 2 patient identifiers, IV checked, risks and benefits discussed, monitors and equipment checked, pre-op evaluation, timeout performed, anesthesia consent and sterile technique used    Procedure Details    Catheter Size:  20 G  Catheter Length:  1 and 3/4 inch  Catheter Type:  Angiocath  Seldinger Technique?: No    Laterality:  Left  Site:  Radial artery  Site Prep: alcohol swabs    Line Secured:  Tape and Tegaderm    Assessment    Events: patient tolerated procedure well with no complications      Medications      Additional Comments

## 2024-06-11 NOTE — CM/SW NOTE
Patient s/p CABG with Dr barger today.  [facility] order as well as face to face certification obtained--[facility] referrals sent in [facility]--pending

## 2024-06-11 NOTE — HISTORICAL OFFICE NOTE
Facility Logo [facility]  [address], [address]th [place] [address]  [phone]      Ilda Gutierrez  Consult  Demographics:  Name: Ilda Gutierrez YOB: 1955  Age: 69, Female Medical Record No: [id number]  Visited Date/Time: 05/29/2024 09:15 AM    Chief Complaints  Consult: Exertional chest tightness.  Abnormal heart scan.  History of Present Illness  Ilda comes in for initial evaluation, accompanied by her daughter, Cindy.  This pleasant 69-year-old woman has a longstanding history of hypertension and hypercholesterolemia.  She was hospitalized in January with pneumonia and pertussis.  It took her a long time to recover from this.  For the last several months, she has been experiencing tightness in her chest with physical activity.  While going for walks, she will have to stop and rest.  She is also noticing symptoms with activities around the house such as climbing a flight of stairs carrying laundry.  She is not having any rest symptoms.  She denies any prior history of coronary, rheumatic or valvular heart disease.  Cardiac risk factors Diabetes, Hypertension, Dyslipidemia and Never smoked  Past Medical History  1.Hypertension  2.Abnormal CT scan of heart  3.Type 2 diabetes mellitus without complication, without long-term current use of insulin  4.Hypercholesterolemia  Family History  1. Father Age 75 - S/P CABG x 3; CAD native (coronary artery disease) (Reason for death)  2. Mother Age 82 - Breast Cancer (Reason for death)  Social History  Smoking status Never smoked  Tobacco usage - No (Non-smoker (finding))  Review of systems  Gastrointestinal No history of Abdominal pain  Cardiovascular Chest pain and CORDON  No history of Palpitations, Syncope, PND, Orthopnea, Edema and Claudication  Respiratory No history of SOB  Hem/Lymphatic No history of Easy bruising  Physical Examination  Vitals Right Arm Sitting  / 74 mmHg, Pulse rate 99 bpm, Regular, Height in 5'  8\", BMI: 26, Weight in 171 lbs (or) 78 kgs and BSA : 1.94 cc/m²  General Appearance No Acute Distress  Head/Eyes/Ears/Nose/Mouth/Throat Mucous membranes Moist, No icterus and Good Dentition  Neck Normal carotid pulsations, No carotid bruits and No JVD  Respiratory Unlabored and Lungs clear with normal breath sounds  Cardiovascular Intact distal pulses and Regular rhythm. Normal and normal S1 and S2    Gastrointestinal Abdomen soft, Non-tender, No organomegaly and No masses  Musculoskeletal Normal spine  Gait Normal gait  Upper Extremities No clubbing and No cyanosis  Lower Extremities Pulses 2+ and equal bilaterally and No edema  Skin Warm and dry  Neurologic / Psychiatric Alert and Oriented  Speech Normal speech  EKG/Other abnormalities  EKG reveals normal sinus rhythm with nonspecific ST and T wave changes.    Coronary calcium score 1,679.    Chest x-ray 2/2024 negative by report.  Carotid ultrasound 4/2023 mild bilateral plaquing only.    Labs from January, 2024 with total cholesterol 135, HDL 44, triglycerides 136 and LDL 67.    Fasting blood glucose 136.  Hemoglobin A1c 6.5.  CMP otherwise unremarkable.  AST/ALT 41/37.  Allergies  No medication allergies noted.  Medications (Info obtained by: Verbal)  1.metFORMIN HCl 1000 MG Oral Tab, Take 1 tablet (1,000 mg total) by mouth 2 (two) times daily with meals.  2.rosuvastatin 10 MG Oral Tab, Take 1 tablet (10 mg total) by mouth nightly.  3.aspirin 81 mg tablet,delayed release, Take 1 tablet orally once a day.  4.lisinopriL 10 mg tablet, Take 1 tablet orally once a day.  5.metFORMIN  mg tablet,extended release 24 hr, Take 1 tablet orally 2 times a day.  Impression  1.Chest pain, precordial  2.CORDON (dyspnea on exertion)  3.Abnormal EKG  4.Hypertension  5.Abnormal CT scan of heart  6.Type 2 diabetes mellitus without complication, without long-term current use of insulin  7.Hypercholesterolemia  Assessment & Plan  Exertional chest discomfort and dyspnea, high  suspicion for exertional angina.  This is in the setting of a positive coronary calcium scan.  Multiple CAD risk factors including risk equivalent of type 2 diabetes mellitus, hypertension and hypercholesterolemia.  PCP recently doubled her lisinopril dose.  I agree with this change.  She recently started taking a baby aspirin daily.  I asked her to continue this.  Noninvasive cardiac evaluation:  Doppler echocardiogram, ASAP.  Exercise nuclear SPECT stress test, ASAP.  Blood work including CBC, CMP, FLP, A1c and TSH, ASAP.  Return for follow-up testing after the above has been completed.  Labs and Diagnostics ordered  1.EKG (electrocardiogram) (Today)  2.CMP (Schedule next available)  3.Hemoglobin A1c (Schedule next available)  4.Lipid panel, Fasting (Schedule next available)  5.TSH (Thyroid Stimulating Hormone) Panel (Schedule next available)  6.CBC (Complete Blood Count) (Schedule next available)  7.Exercise Nuclear Stress Test (SPECT) - (71534) (Schedule next available)  8.Echocardiography - Complete (Schedule next available)  Future appointments  1.Follow up visit - Francisco Saleem MD (Schedule next available)  2.Referral Visit - Olivia Jose ([email or web address]) : (Today)  Nurses documentation  Refill: none   Upcoming surgeries: none   Use of assistive devices(s): none   Triage & medication list reviewed by:   APOLINAR  Patient instructions  Medications:  1. Continue current medications    Testin. CBC, CMP, FLP, A1c and TSH, ASAP    2. Echocardiogram:  Your provider has ordered an echocardiogram. This is an ultrasound of your heart to further assess its function and valves. You may require an IV.  You will be required to remove your shirt AND BRA, no all in-in-ones, dresses or coveralls. This test takes approximately 45 to 60 minutes.     3. Exercise SPECT Perfusion study :   Your provider has ordered a SPECT perfusion study. This is a stress test for your heart. It will help detect the presence of  blockages in your heart. Please refer to the Patient Guide for Perfusion Study for further information.             It is important not to have any caffeine for 12 hours before the test.  Caffeine includes soft drinks, tea, coffee - including decaffeinated products, chocolate, energy drinks, diet pills, Excedrin, Anacin, Butalbital, Fioricet, and some other migraine medications.             Do not have anything to eat or drink for 2-3 hours prior to your test.        Provider follow up:  1. Follow up with Dr. Saleem after testing  CPOE Orders carried out by: Natividad Campos and Katiana Holman CMA  Care Providers: Francisco Saleem MD, Natividad Campos and Katiana Holman CMA  Electronically Authenticated by  Francisco Saleem MD  05/29/2024 12:48:28 PM  Disclaimer: Components of this note were documented using voice recognition system and are subject to errors not corrected at proofreading. Contact the author of this note for any clarifications.

## 2024-06-11 NOTE — ANESTHESIA PROCEDURE NOTES
Central Line    Performed by: Homero Head MD  Authorized by: Homero Head MD    General Information and Staff    Procedure Start:   Procedure End:  6/11/2024 9:25 AM  Anesthesiologist:  Homero Head MD  Performed by:  Anesthesiologist  Patient Location:  OR  Indication: central venous access and CVP monitoring    Site Identification: real time ultrasound guided and image stored and retrievable        Procedure Detail    Patient Position:  Trendelenburg  Laterality:  Right  Site:  Internal jugular  Prep:  Chloraprep  Catheter Size:  9 Fr  Catheter Type:  MAC introducer  Number of Lumens:  Double lumen  Procedure Detail: target vein identified, needle advanced into vein and blood aspirated and guidewire advanced into vein    Seldinger Technique?: Yes    Intravenous Verification: verified by ultrasound and venous blood return    Post Insertion: all ports aspirated, all ports flushed easily, guidewire was removed intact, line was sutured in place and dressing was applied      Assessment    Events: patient tolerated procedure well with no complications      PA Catheter Placement    PA Catheter Placed?: Yes    PA Catheter Type:  Oximetric  PA Catheter Size:  8  Laterality:  Right  Site:  Internal jugular  Placement Confirmation: pressure tracing changes and verified by AKANKSHA    PA Catheter Depth (cm):  47  Events: patient tolerated procedure well with no complications      Additional Comments

## 2024-06-11 NOTE — CONSULTS
[place] HOSPITALIST  CONSULT     Ilda Gutierrez Patient Status:  Inpatient    1955 MRN [id number]   Location [facility] 6NE-A Attending Jp Maynard MD   Hosp Day # 0 PCP Kasey Mendenhall MD     Reason for consult: Medical management    Requested by: Dr. Jp Maynard    Subjective:   History of Present Illness:     Ilda Gutierrez is a 69 year old female with history of CAD with recent cath demonstrating severe ostial stenosis of RCA and severe distal left main stenosis, type 2 diabetes, hypertension, hyperlipidemia was admitted to undergo CABG by Dr. Maynard.  Postop day 0.  Most recent A1c 6.6 on 2024.  She is iron deficient with a recent ferritin of 3.1.    History/Other:    Past Medical History:  Past Medical History:    Coronary atherosclerosis    Diabetes (HCC)    High blood pressure    High cholesterol    Hyperlipidemia     Past Surgical History:   Past Surgical History:   Procedure Laterality Date    Spine surgery procedure unlisted        Family History:   Family History   Problem Relation Age of Onset    Breast Cancer Mother 70     Social History:    reports that she has never smoked. She has never used smokeless tobacco. She reports current alcohol use. She reports that she does not use drugs.     Allergies: No Known Allergies    Medications:    Current Facility-Administered Medications on File Prior to Encounter   Medication Dose Route Frequency Provider Last Rate Last Admin    [COMPLETED] lidocaine PF (Xylocaine-MPF) 1 % injection             [COMPLETED] heparin (Porcine) 5000 UNIT/ML injection             [COMPLETED] fentaNYL (Sublimaze) 50 mcg/mL injection             [COMPLETED] midazolam (Versed) 2 MG/2ML injection             [COMPLETED] iohexol (OMNIPAQUE) 350 MG/ML injection 150 mL  150 mL Intravascular ONCE PRN Francisco Saleem MD   125 mL at 24 1158     Current Outpatient Medications on File Prior to Encounter   Medication Sig Dispense Refill    metFORMIN HCl ER, OSM, 500  MG (OSM) Oral Tablet 24 Hr Take 1 tablet (500 mg total) by mouth 2 (two) times daily with meals.      aspirin 81 MG Oral Tab EC Take 1 tablet (81 mg total) by mouth at bedtime.      lisinopril 10 MG Oral Tab Take 1 tablet (10 mg total) by mouth daily. (Patient taking differently: Take 1 tablet (10 mg total) by mouth at bedtime.) 30 tablet 1    rosuvastatin 10 MG Oral Tab Take 1 tablet (10 mg total) by mouth nightly.         Review of Systems:   A comprehensive review of systems was completed.    Pertinent positives and negatives noted in the HPI.    Objective:   Physical Exam:    /59   Pulse 75   Temp 97.9 °F (36.6 °C) (Temporal)   Resp 14   Ht 5' 8\" (1.727 m)   Wt 165 lb (74.8 kg)   SpO2 98%   BMI 25.09 kg/m²   General: Intubated and sedated  Respiratory: Mechanical vent sounds  Cardiovascular: S1, S2. Regular rate and rhythm  Abdomen: Soft, active bowel sounds  Neuro: sedated  Extremities: No edema    Results:    Labs:      Labs Last 24 Hours:  Recent Labs   Lab 06/06/24  1442 06/11/24  1359   WBC  --  10.2   HGB  --  9.6*   MCV  --  69.3*   PLT  --  234.0   INR 1.04  --        No results for input(s): \"GLU\", \"BUN\", \"CREATSERUM\", \"GFRAA\", \"GFRNAA\", \"CA\", \"ALB\", \"NA\", \"K\", \"CL\", \"CO2\", \"ALKPHO\", \"AST\", \"ALT\", \"BILT\", \"TP\" in the last 168 hours.    Recent Labs   Lab 06/06/24  1442   PTP 13.6   INR 1.04       No results for input(s): \"TROP\", \"CK\" in the last 168 hours.      Imaging: Imaging data reviewed in Epic.    Assessment & Plan:      # CAD status post CABG  Postop day 0  -Pressors per CV surgery  -Cardiology following  -Aspirin, beta-blocker, statin    #Type 2 diabetes  A1c 6.6.  Patient is on metformin 500 mg twice daily at home.  -Insulin drip -> algorithm B    #Hypertension  #Hyperlipidemia-Crestor 10 mg daily    Plan of care discussed with RN    Allison Hoffmann MD  6/11/2024    The 21st Century Cures Act makes medical notes like these available to patients in the interest of transparency. Please  be advised this is a medical document. Medical documents are intended to carry relevant information, facts as evident, and the clinical opinion of the practitioner. The medical note is intended as peer to peer communication and may appear blunt or direct. It is written in medical language and may contain abbreviations or verbiage that are unfamiliar.

## 2024-06-11 NOTE — CONSULTS
[facility]   part of [facility]      Report of Consultation    Ilda Gutierrez Patient Status:  Inpatient    1955 MRN [id number]   Location [facility] 6NE-A Attending Jp Maynard MD   Hosp Day # 0 PCP Kasey Mendenhall MD     Reason for Consultation:  Cardiac management s/p CABG    History of Present Illness:  Ilda Gutierrez is a 69 year old female with PMHx CAD, DM2, HTN, HLD with recent ischemic evaluation for CORDON with cardiac cath demonstrating severe ostial stenosis of RCA, severe distal L main stenosis. CABG was recommended and she presented  for scheduled elective procedure.    She is now POD 0 CABG    History:  Past Medical History:    Coronary atherosclerosis    Diabetes (HCC)    High blood pressure    High cholesterol    Hyperlipidemia     Past Surgical History:   Procedure Laterality Date    Spine surgery procedure unlisted       Family History   Problem Relation Age of Onset    Breast Cancer Mother 70      reports that she has never smoked. She has never used smokeless tobacco. She reports current alcohol use. She reports that she does not use drugs.    Allergies:  No Known Allergies    Medications:    Current Facility-Administered Medications:     midazolam (Versed) 2 MG/2ML injection 1 mg, 1 mg, Intravenous, Q30 Min PRN    propofol (Diprivan) 10 mg/mL infusion premix, 5-50 mcg/kg/min (Dosing Weight), Intravenous, Continuous    chlorhexidine gluconate (Peridex) 0.12 % oral solution 15 mL, 15 mL, Mouth/Throat, BID@0800,2000    dexmedeTOMIDine in sodium chloride 0.9% (Precedex) 400 mcg/100mL infusion premix, 0.2-1.5 mcg/kg/hr (Dosing Weight), Intravenous, Continuous    ipratropium-albuterol (Duoneb) 0.5-2.5 (3) MG/3ML inhalation solution 3 mL, 3 mL, Nebulization, Q4H PRN    glucose (Dex4) 15 GM/59ML oral liquid 15 g, 15 g, Oral, Q15 Min PRN **OR** glucose (Glutose) 40% oral gel 15 g, 15 g, Oral, Q15 Min PRN **OR** glucose-vitamin C (Dex-4) chewable tab 4 tablet, 4 tablet,  Oral, Q15 Min PRN **OR** dextrose 50% injection 50 mL, 50 mL, Intravenous, Q15 Min PRN **OR** glucose (Dex4) 15 GM/59ML oral liquid 30 g, 30 g, Oral, Q15 Min PRN **OR** glucose (Glutose) 40% oral gel 30 g, 30 g, Oral, Q15 Min PRN **OR** glucose-vitamin C (Dex-4) chewable tab 8 tablet, 8 tablet, Oral, Q15 Min PRN    insulin regular human (Novolin R, Humulin R) 100 Units in sodium chloride 0.9% 100 mL standard infusion (100 mL), 1-57 Units/hr, Intravenous, Continuous    [START ON 6/12/2024] aspirin DR tab 81 mg, 81 mg, Oral, Daily    rosuvastatin (Crestor) tab 10 mg, 10 mg, Oral, Nightly    sodium chloride 0.9% infusion, , Intravenous, Continuous    acetaminophen (Ofirmev) 10 mg/mL infusion premix 1,000 mg, 1,000 mg, Intravenous, Q6H    melatonin tab 3 mg, 3 mg, Oral, Nightly PRN    sennosides (Senokot) tab 8.6 mg, 8.6 mg, Oral, BID    docusate sodium (Colace) cap 100 mg, 100 mg, Oral, BID    polyethylene glycol (PEG 3350) (Miralax) 17 g oral packet 17 g, 17 g, Oral, Daily PRN    bisacodyl (Dulcolax) 10 MG rectal suppository 10 mg, 10 mg, Rectal, Daily PRN    ondansetron (Zofran) 4 MG/2ML injection 4 mg, 4 mg, Intravenous, Q6H PRN    vancomycin (Vancocin) 1,000 mg in sodium chloride 0.9% 250 mL IVPB-ADDV, 15 mg/kg, Intravenous, Q12H    albumin human (Albumin) 5% injection 12.5 g, 12.5 g, Intravenous, Once PRN    DOBUTamine in dextrose 5% (Dobutrex) 500 mg/250mL infusion premix, 2.5-20 mcg/kg/min (Dosing Weight), Intravenous, Continuous PRN    nitroGLYCERIN in dextrose 5% 50 mg/250mL infusion premix, 5-300 mcg/min, Intravenous, Continuous PRN    norepinephrine (Levophed) 4 mg/250mL infusion premix, 0.5-30 mcg/min, Intravenous, Continuous PRN    NitroPRUSSide (Nipride) 50 mg in dextrose 5% 250 mL infusion, 0.1-4 mcg/kg/min (Dosing Weight), Intravenous, Continuous PRN    [START ON 6/12/2024] metoprolol tartrate (Lopressor) partial tab 12.5 mg, 12.5 mg, Oral, 2x Daily(Beta Blocker)    potassium chloride 20 mEq/100mL IVPB  premix 20 mEq, 20 mEq, Intravenous, PRN **OR** potassium chloride 40 mEq/100mL IVPB premix (central line) 40 mEq, 40 mEq, Intravenous, PRN    calcium gluconate 3 g in sodium chloride 0.9% 100 mL IVPB, 3 g, Intravenous, PRN    magnesium sulfate in dextrose 5% 1 g/100mL infusion premix 1 g, 1 g, Intravenous, PRN    magnesium sulfate in sterile water for injection 2 g/50mL IVPB premix 2 g, 2 g, Intravenous, PRN    mupirocin (Bactroban) 2% nasal ointment 1 Application, 1 Application, Nasal, BID    dextrose 5%-sodium chloride 0.45% infusion,  mL/hr, Intravenous, Continuous    morphINE PF 2 MG/ML injection 2 mg, 2 mg, Intravenous, Q2H PRN **OR** morphINE PF 4 MG/ML injection 4 mg, 4 mg, Intravenous, Q2H PRN    [START ON 2024] pantoprazole (Protonix) 40 mg in sodium chloride 0.9% PF 10 mL IV push, 40 mg, Intravenous, QAM AC **OR** [START ON 2024] pantoprazole (Protonix) DR tab 40 mg, 40 mg, Oral, QAM AC    ceFAZolin (Ancef) 2g in 10mL IV syringe premix, 2 g, Intravenous, Q8H    sodium chloride 0.9% infusion, , Intravenous, Continuous    HYDROmorphone in sodium chloride 0.9% (Dilaudid) 20 mg/100mL PCA premix, , Intravenous, Continuous    HYDROmorphone 0.4 mg BOLUS FROM PCA, 0.4 mg, Intravenous, Q30 Min PRN    naloxone (Narcan) 0.4 MG/ML injection 0.08 mg, 0.08 mg, Intravenous, Q5 Min PRN    diphenhydrAMINE (Benadryl) 50 mg/mL  injection 12.5 mg, 12.5 mg, Intravenous, Q4H PRN    Review of Systems: unable to obtain, intubated/sedated    Physical Exam:  Blood pressure 148/72, pulse 75, temperature 97.9 °F (36.6 °C), temperature source Temporal, resp. rate 14, height 5' 8\" (1.727 m), weight 165 lb (74.8 kg), SpO2 98%.  Temp (24hrs), Av.9 °F (36.6 °C), Min:97.9 °F (36.6 °C), Max:97.9 °F (36.6 °C)    Wt Readings from Last 3 Encounters:   24 165 lb (74.8 kg)   24 171 lb (77.6 kg)   24 173 lb (78.5 kg)       General: intubated/sedated  HEENT: Mucous membranes moist.   Neck: No JVD. RIJ  line+  Cardiac: Regular rate and rhythm, S1, S2 normal, no murmur, rub or gallop.  Lungs: Clear with anterior auscultation without wheezes, rales. intubated  Abdomen: Soft, non-tender.   Extremities: Without clubbing, cyanosis or edema.   Neurologic: sedated  Skin: Warm and dry.     Laboratories and Data:  Diagnostics:    Telemetry: 6/11/24 NSR    EKG 6/11/24: NSR. LAD.     AKANKSHA 6/11/24:Dilated LV, normal LV systolic function, LVEF estimated at 55% visually (54.4% by Kern's Bi-Plane, 56% by FAC). Dilated RV, normal RV systolic function (TAPSE = 18mm). The AV is trileaflet with normal annulus and valve structure, there is trace central AI, no aortic stenosis (peak gradient 4 mmHg, mean gradient 1 mmHg, NILESH 2.37 cm2 by continuity equation). The MV annulus and leaflets are normal in structure, there is trace MR. Trace TR with PAC in place. No PFO by color doppler. Mild thoracic aorta calcifications visualized.     Cath 6/6/24:    The JL4 catheter would not seat well in the left main coronary artery.  The JL3.5 catheter seated very nicely.  The left main is long.  There is an 80% distal left main stenosis, at the bifurcation of the left main into left anterior descending and circumflex coronary distributions.  The left main is a congenitally rather small vessel which reaches approximately two-thirds of the way down the anterior left ventricle.  It appears free of disease throughout its length.  The circumflex coronary artery gives rise to a large proximal first bifurcating obtuse marginal vessel before continuing as a rudimentary AV groove circumflex vessel.     The aorto-ostium of the right coronary artery appears calcified.  The JR4 catheter did seat in the RCA, with significant pressure dampening.  This was exchanged for an AR mod, which sat just at the ostium.  There was a 90% ostial stenosis of the RCA noted.  This is a large, dominant artery which gives rise to the PDA as well as several large posterior left  ventricular branches.  The PDA wraps around and supplies the distal left ventricular apex.  CONCLUSIONS:    1.     Normal hemodynamics.  2.     Normal left ventricular systolic function.  3.     Severe distal left main and ostial right coronary artery disease as described above.    CXR: pending    Labs:      Lab Results   Component Value Date    INR 1.04 06/06/2024       Assessment:    CAD s/p CABG 6/11/24- POD 0  Hemodynamically stable on dobutamine 2 mcg/kg/min. PAP 24/10. CVP 7.  Op report pending, cath 6/6 with severe distal L main and ostial RCA stenosis  EKG NSR  CKR pending  pLVEF  ASA, BB, statin  HTN- Normotensive  HLD - Crestor 10 mg daily. Recent LDL 55  DM2      Plan:  Routine post-op CCU recovery  Wean dobutamine as tolerated  Will review CXR when completed      JACOB Arguelles  6/11/2024  1:41 PM    Patient seen and examined independently.  Note reviewed and labs reviewed. Agree with above assessment and plan. 69 year old female who presented for CABG x3. Official op note pending. Seen post op. Intubated and sedated. On low dose dobutamine - wean as tolerated. SWAN in place - readings reviewed. Native sinus rhythm. CXR pending. Cont close monitoring and follow standard post-op protocol. Will cont to follow.        Zackery Bustos DO  Cardiologist  [facility]  6/11/2024 2:24 PM      Note to the patient: The 21st Century Cures Act makes medical notes like these available to patients in the interest of transparency. However, be advised that this is a medical document. It is intended as peer to peer communication. It is written in medical language and may contain abbreviations or verbiage that are unfamiliar. It may appear blunt or direct. Medical documents are intended to carry relevant information, facts as evident, and clinical opinion of the practitioner.     Disclaimer: Components of this note were documented using voice recognition system and are subject to errors not corrected at  proofreading. Contact the author of this note for any clarifications.

## 2024-06-11 NOTE — ANESTHESIA PROCEDURE NOTES
Airway  Date/Time: 6/11/2024 9:14 AM  Urgency: elective      General Information and Staff    Patient location during procedure: OR  Anesthesiologist: Homero Head MD  Performed: anesthesiologist   Performed by: Homero Head MD  Authorized by: Homero Head MD      Indications and Patient Condition  Indications for airway management: anesthesia  Spontaneous Ventilation: absent  Sedation level: deep  Preoxygenated: yes  Patient position: sniffing  Mask difficulty assessment: 1 - vent by mask    Final Airway Details  Final airway type: endotracheal airway      Successful airway: ETT  Cuffed: yes   Successful intubation technique: direct laryngoscopy  Facilitating devices/methods: intubating stylet  Endotracheal tube insertion site: oral  Blade: Rick  Blade size: #3  ETT size (mm): 7.5    Cormack-Lehane Classification: grade IIA - partial view of glottis  Placement verified by: capnometry   Measured from: teeth  ETT to teeth (cm): 23  Number of attempts at approach: 1

## 2024-06-11 NOTE — PLAN OF CARE
Received patient from cvor s/p CABG x3. Usual lines. Extubated around 1730. Dobs/insulin/dilaudid pca. Pacing wires connected to backup pacemaker. Vss. See assessment for complete details. Will continue to monitor.

## 2024-06-11 NOTE — ANESTHESIA POSTPROCEDURE EVALUATION
[facility]    Ilda Gutierrez Patient Status:  Inpatient   Age/Gender 69 year old female MRN [id number]   Location [facility] 6NE-A Attending Jp Maynard MD   Hosp Day # 0 PCP Kasey Mendenhall MD       Anesthesia Post-op Note    CORONARY ARTERY BYPASS GRAFT x3. INTRAOPERATIVE TRANSESOPHAGEAL ECHOCARDIOGRAM. TAKEDOWN OF LEFT INTERNAL MAMMARY ARTERY. ENDOSCOPIC HARVEST OF LEFT SAPHENOUS VEIN.    Procedure Summary       Date: 06/11/24 Room / Location:  CVOR 01 /  CVOR    Anesthesia Start: 0903 Anesthesia Stop: 1335    Procedure: CORONARY ARTERY BYPASS GRAFT x3. INTRAOPERATIVE TRANSESOPHAGEAL ECHOCARDIOGRAM. TAKEDOWN OF LEFT INTERNAL MAMMARY ARTERY. ENDOSCOPIC HARVEST OF LEFT SAPHENOUS VEIN. Diagnosis: (CORONARY ARTERY DISEASE)    Surgeons: Jp Maynard MD Anesthesiologist: Homreo Head MD    Anesthesia Type: general ASA Status: 4            Anesthesia Type: general    Vitals Value Taken Time   /67 06/11/24 1700   Temp 98.1 °F (36.7 °C) 06/11/24 1700   Pulse 81 06/11/24 1746   Resp 21 06/11/24 1746   SpO2 100 % 06/11/24 1746   Vitals shown include unfiled device data.    Patient Location: ICU    Anesthesia Type: general    Airway Patency: patent and intubated    Postop Pain Control: sedated until time of extubation    Mental Status: sedated until time of extubation    Nausea/Vomiting: none    Cardiopulmonary/Hydration status: stable euvolemic    Complications: no apparent anesthesia related complications    Postop vital signs: stable    Dental Exam: Unchanged from Preop    Sign out given to ICU staff.

## 2024-06-11 NOTE — DIETARY NOTE
Clinical Nutrition    Dietitian consult received per cardiac rehab standing order. Pt to be educated by cardiac rehab staff and encouraged to attend outpatient classes taught by RD. RD available PRN.    Mary Zhao MS, RD, LDN  Clinical Dietitian  Ext: [phone]

## 2024-06-11 NOTE — ANESTHESIA PROCEDURE NOTES
Procedure Performed: AKANKSHA       Start Time:        End Time:   6/11/2024 9:28 AM    Preanesthesia Checklist:  Patient identified, IV assessed, risks and benefits discussed, monitors and equipment assessed, procedure being performed at surgeon's request and anesthesia consent obtained.    General Procedure Information  Diagnostic Indications for Echo:  assessment of ascending aorta and hemodynamic monitoring  Physician Requesting Echo: Jp Maynard MD  Location performed:  OR  Intubated  Bite block placed  Heart visualized  Probe Insertion:  Easy  Probe Type:  Multiplane  Modalities:  2D only, color flow mapping, pulse wave Doppler and continuous wave Doppler        Anesthesia Information  Performed Personally  Anesthesiologist:  Homero Head MD      Echocardiogram Comments:         Dilated LV, normal LV systolic function, LVEF estimated at 55% visually (54.4% by Kern's Bi-Plane, 56% by FAC). Dilated RV, normal RV systolic function (TAPSE = 18mm). The AV is trileaflet with normal annulus and valve structure, there is trace central AI, no aortic stenosis (peak gradient 4 mmHg, mean gradient 1 mmHg, NILESH 2.37 cm2 by continuity equation). The MV annulus and leaflets are normal in structure, there is trace MR. Trace TR with PAC in place. No PFO by color doppler. Mild thoracic aorta calcifications visualized.     The seldinger wire for aortic cannulation was visualized in the descending thoracic aorta prior to cannulation.   Post  Post Intervention Follow-up Study:No Change  Ventricular FXN:  Global FXN: Unchanged   Regional FXN: Unchanged  Valve FXN:  Native Valve:No change            Comments: At case end, LVEF is unchanged at 55% on dobutamine for inotropy. RV function unchanged and normal on dobutamine. Otherwise unchanged heart structure / function. No aortic dissection after decannulation.

## 2024-06-11 NOTE — DISCHARGE INSTRUCTIONS
To access the Dr. Maynard discharge instructions video on your home computer:   [email or web address]  Remove steri strips from all incisions on 6/25    [facility] @ discharge  [facility]  P: [phone]  F: [phone]

## 2024-06-11 NOTE — PROGRESS NOTES
06/11/24 1625   Vent Information   $ RT Standby Charge (per 15 min) 1   Vent Initiation Date 06/11/24   Ventilation Day(s) 1   Interface Invasive   Vent Type PB   Vent plugged into main power? Yes   Vent Mode PS   Settings   FiO2 (%) 40 %   PEEP/CPAP (cm H2O) 5 cm H20   Press Support CWP 5   Readings   Total RR 15   Minute Ventilation (L/min) 7.3 L/min   Vt Spontaneous (mL) 529 mL   PIP Observed (cm H2O) 11 cm H2O   MAP (cm H2O) 7.4   I/E Ratio 1:6.1   Alarms   High RR 40   Insp Pressure High (cm H2O) 40 cm H2O   Insp Pressure Low (cm H2O) 10 cm H2O   MV High (L/min) 20 L/min   MV Low (L/min) 4 L/min   Apnea Interval (sec) 20 seconds   Apnea Rate 16   Apnea Volume (mL) 450 mL   Spontaneous Breathing Trial   Spontaneous Breathing Trial Complete Y   Is the FiO2 <= 0.5? (titrated for sats 92-94%) Y   Is the PEEP <= 5? Y   Is the RSBI <=104 Y   Is the patient off pressor and narcotic / sedation drips? Y   Is the patient free of ventricular arrhythmias in the past 24 hours? Y   Is the patient's cough adequate? Y   Is the patient alert (neuro), able to follow commands? Y   Daily Screen Meets All Criteria Yes   Spontaneous Parameters   Spontaneous RR Rate 15   Spontaneous Minute Volume 7.27   Average Spontaneous Tidal Volume 529   $ Spontaneous Vital Capacity 674   Negative Inspiratory Force -17   Total RSBI 52     RT placed pt on SBT on above settings. ABG drawn anesthesia given verbal to extubate. Pt placed on 3L NC and did IS.

## 2024-06-11 NOTE — PROGRESS NOTES
06/11/24 1335   Vent Information   $ RT Standby Charge (per 15 min) 1   $ Vent Care / Non-Invasive Initial Day Yes   $ Ventilator supplies provided Yes   Is this patient on Chronic Ventilation? No   Vent Initiation Date 06/11/24   Vent Initiation Time 1335   Ventilation Day(s) 1   Interface Invasive   Vent Type PB   Vent plugged into main power? Yes   Vent Mode VC+   Settings   FiO2 (%) 50 %   Resp Rate (Set) 16   Vt (Set, mL) 500 mL   Waveform Decelerating ramp   PEEP/CPAP (cm H2O) 5 cm H20   Insp Time (sec) 0.9 sec   Insp Rise Time (%) 70 %   Trigger Sensitivity Flow (L/min) 3 L/min   Humidification Heater   H2O Bag Level Filled   Heater Temperature 72 °F (22.2 °C)   Readings   Total RR 16   Minute Ventilation (L/min) 6.8 L/min   Inspiratory Tidal Volume 450 mL   Expiratory Tidal Volume 424 mL   PIP Observed (cm H2O) 17 cm H2O   MAP (cm H2O) 9   I/E Ratio 1:3.2   Plateau Pressure (cm H2O) 15 cm H2O   Alarms   High RR 40   Insp Pressure High (cm H2O) 40 cm H2O   Insp Pressure Low (cm H2O) 10 cm H2O   MV High (L/min) 20 L/min   MV Low (L/min) 4 L/min   Apnea Interval (sec) 20 seconds   Apnea Rate 16   Apnea Volume (mL) 450 mL   ETT   Placement Date/Time: 06/11/24 (c) 0932   Airway Size: 7.5 mm  Cuffed: Cuffed  Insertion attempts: 1  Technique: Direct laryngoscopy  Placement Verification: Capnometry  Placed By: Anesthesiologist   Secured at (cm) 24 cm   Suctioned? N   Measured From Lips   Secured Location Center   Secured by Cloth tape   Site Condition Cool;Dry   Req'd equipment at bedside Bag mask     RT received pt post heart @ 1335 Pt intubated with above settings FIO2 weaned to 50% plan is to extubate pt by end of shift monitoring ongoing.

## 2024-06-12 ENCOUNTER — APPOINTMENT (OUTPATIENT)
Dept: GENERAL RADIOLOGY | Facility: HOSPITAL | Age: 69
DRG: 236 | End: 2024-06-12
Attending: PHYSICIAN ASSISTANT

## 2024-06-12 ENCOUNTER — APPOINTMENT (OUTPATIENT)
Dept: GENERAL RADIOLOGY | Facility: HOSPITAL | Age: 69
DRG: 236 | End: 2024-06-12
Attending: THORACIC SURGERY (CARDIOTHORACIC VASCULAR SURGERY)

## 2024-06-12 PROBLEM — I25.10 CAD (CORONARY ARTERY DISEASE): Status: RESOLVED | Noted: 2024-06-11 | Resolved: 2024-06-12

## 2024-06-12 PROBLEM — Z95.1 S/P CABG X 3: Status: ACTIVE | Noted: 2024-06-12

## 2024-06-12 LAB
ATRIAL RATE: 77 BPM
ATRIAL RATE: 86 BPM
BASOPHILS # BLD AUTO: 0.01 X10(3) UL (ref 0–0.2)
BASOPHILS NFR BLD AUTO: 0.1 %
BLOOD TYPE BARCODE: 600
BUN BLD-MCNC: 15 MG/DL (ref 9–23)
CALCIUM BLD-MCNC: 8.4 MG/DL (ref 8.5–10.1)
CHLORIDE SERPL-SCNC: 115 MMOL/L (ref 98–112)
CO2 SERPL-SCNC: 23 MMOL/L (ref 21–32)
CREAT BLD-MCNC: 0.74 MG/DL
EGFRCR SERPLBLD CKD-EPI 2021: 88 ML/MIN/1.73M2 (ref 60–?)
EOSINOPHIL # BLD AUTO: 0 X10(3) UL (ref 0–0.7)
EOSINOPHIL NFR BLD AUTO: 0 %
ERYTHROCYTE [DISTWIDTH] IN BLOOD BY AUTOMATED COUNT: 19.8 %
GLUCOSE BLD-MCNC: 109 MG/DL (ref 70–99)
GLUCOSE BLD-MCNC: 121 MG/DL (ref 70–99)
GLUCOSE BLD-MCNC: 122 MG/DL (ref 70–99)
GLUCOSE BLD-MCNC: 133 MG/DL (ref 70–99)
GLUCOSE BLD-MCNC: 134 MG/DL (ref 70–99)
GLUCOSE BLD-MCNC: 135 MG/DL (ref 70–99)
GLUCOSE BLD-MCNC: 136 MG/DL (ref 70–99)
GLUCOSE BLD-MCNC: 137 MG/DL (ref 70–99)
GLUCOSE BLD-MCNC: 142 MG/DL (ref 70–99)
GLUCOSE BLD-MCNC: 143 MG/DL (ref 70–99)
GLUCOSE BLD-MCNC: 155 MG/DL (ref 70–99)
GLUCOSE BLD-MCNC: 178 MG/DL (ref 70–99)
GLUCOSE BLD-MCNC: 183 MG/DL (ref 70–99)
GLUCOSE BLD-MCNC: 226 MG/DL (ref 70–99)
GLUCOSE BLD-MCNC: 99 MG/DL (ref 70–99)
HCT VFR BLD AUTO: 27.7 %
HGB BLD-MCNC: 8.6 G/DL
IMM GRANULOCYTES # BLD AUTO: 0.06 X10(3) UL (ref 0–1)
IMM GRANULOCYTES NFR BLD: 0.5 %
LYMPHOCYTES # BLD AUTO: 0.55 X10(3) UL (ref 1–4)
LYMPHOCYTES NFR BLD AUTO: 4.7 %
MAGNESIUM SERPL-MCNC: 2.2 MG/DL (ref 1.6–2.6)
MCH RBC QN AUTO: 21.7 PG (ref 26–34)
MCHC RBC AUTO-ENTMCNC: 31 G/DL (ref 31–37)
MCV RBC AUTO: 69.8 FL
MONOCYTES # BLD AUTO: 1.16 X10(3) UL (ref 0.1–1)
MONOCYTES NFR BLD AUTO: 10 %
NEUTROPHILS # BLD AUTO: 9.81 X10 (3) UL (ref 1.5–7.7)
NEUTROPHILS # BLD AUTO: 9.81 X10(3) UL (ref 1.5–7.7)
NEUTROPHILS NFR BLD AUTO: 84.7 %
P AXIS: 30 DEGREES
P AXIS: 4 DEGREES
P-R INTERVAL: 164 MS
P-R INTERVAL: 194 MS
PLATELET # BLD AUTO: 229 10(3)UL (ref 150–450)
POTASSIUM SERPL-SCNC: 4 MMOL/L (ref 3.5–5.1)
Q-T INTERVAL: 366 MS
Q-T INTERVAL: 416 MS
QRS DURATION: 84 MS
QRS DURATION: 88 MS
QTC CALCULATION (BEZET): 437 MS
QTC CALCULATION (BEZET): 470 MS
R AXIS: -32 DEGREES
R AXIS: -47 DEGREES
RBC # BLD AUTO: 3.97 X10(6)UL
SODIUM SERPL-SCNC: 145 MMOL/L (ref 136–145)
T AXIS: 37 DEGREES
T AXIS: 9 DEGREES
UNIT VOLUME: 350 ML
VENTRICULAR RATE: 77 BPM
VENTRICULAR RATE: 86 BPM
WBC # BLD AUTO: 11.6 X10(3) UL (ref 4–11)

## 2024-06-12 PROCEDURE — 71045 X-RAY EXAM CHEST 1 VIEW: CPT | Performed by: THORACIC SURGERY (CARDIOTHORACIC VASCULAR SURGERY)

## 2024-06-12 PROCEDURE — 99233 SBSQ HOSP IP/OBS HIGH 50: CPT | Performed by: INTERNAL MEDICINE

## 2024-06-12 PROCEDURE — 71045 X-RAY EXAM CHEST 1 VIEW: CPT | Performed by: PHYSICIAN ASSISTANT

## 2024-06-12 RX ORDER — FUROSEMIDE 10 MG/ML
40 INJECTION INTRAMUSCULAR; INTRAVENOUS ONCE
Status: COMPLETED | OUTPATIENT
Start: 2024-06-12 | End: 2024-06-12

## 2024-06-12 RX ORDER — HYDROCODONE BITARTRATE AND ACETAMINOPHEN 5; 325 MG/1; MG/1
2 TABLET ORAL EVERY 4 HOURS PRN
Status: DISCONTINUED | OUTPATIENT
Start: 2024-06-12 | End: 2024-06-15

## 2024-06-12 RX ORDER — HYDROCODONE BITARTRATE AND ACETAMINOPHEN 5; 325 MG/1; MG/1
1 TABLET ORAL EVERY 4 HOURS PRN
Status: DISCONTINUED | OUTPATIENT
Start: 2024-06-12 | End: 2024-06-15

## 2024-06-12 RX ORDER — SCOLOPAMINE TRANSDERMAL SYSTEM 1 MG/1
1 PATCH, EXTENDED RELEASE TRANSDERMAL
Status: DISCONTINUED | OUTPATIENT
Start: 2024-06-12 | End: 2024-06-15

## 2024-06-12 NOTE — PLAN OF CARE
Assumed care at 0700.   A&Ox4, RA, NSR on tele  Prn norco given for pain   Mid sternal w/ steri strips and painted w/ betadine; C/D/I  L dressing in place, C/D/I  Voiding up in bathroom   Delined this am  IV abx given per order  Call light within reach    Patient updated on plan of care         Problem: Diabetes/Glucose Control  Goal: Glucose maintained within prescribed range  Description: INTERVENTIONS:  - Monitor Blood Glucose as ordered  - Assess for signs and symptoms of hyperglycemia and hypoglycemia  - Administer ordered medications to maintain glucose within target range  - Assess barriers to adequate nutritional intake and initiate nutrition consult as needed  - Instruct patient on self management of diabetes  Outcome: Progressing     Problem: Patient/Family Goals  Goal: Patient/Family Long Term Goal  Description: Patient's Long Term Goal: dc home    Interventions:  - work with PT/OT   -follow up with outpatient MD's   -cardiac rehab  - See additional Care Plan goals for specific interventions  Outcome: Progressing  Goal: Patient/Family Short Term Goal  Description: Patient's Short Term Goal: sleep    Interventions:   - limit noise  - cluster care  -provide sleeping kit   - See additional Care Plan goals for specific interventions  Outcome: Progressing     Problem: CARDIOVASCULAR - ADULT  Goal: Maintains optimal cardiac output and hemodynamic stability  Description: INTERVENTIONS:  - Monitor vital signs, rhythm, and trends  - Monitor for bleeding, hypotension and signs of decreased cardiac output  - Evaluate effectiveness of vasoactive medications to optimize hemodynamic stability  - Monitor arterial and/or venous puncture sites for bleeding and/or hematoma  - Assess quality of pulses, skin color and temperature  - Assess for signs of decreased coronary artery perfusion - ex. Angina  - Evaluate fluid balance, assess for edema, trend weights  Outcome: Progressing  Goal: Absence of cardiac arrhythmias or at  baseline  Description: INTERVENTIONS:  - Continuous cardiac monitoring, monitor vital signs, obtain 12 lead EKG if indicated  - Evaluate effectiveness of antiarrhythmic and heart rate control medications as ordered  - Initiate emergency measures for life threatening arrhythmias  - Monitor electrolytes and administer replacement therapy as ordered  Outcome: Progressing     Problem: PAIN - ADULT  Goal: Verbalizes/displays adequate comfort level or patient's stated pain goal  Description: INTERVENTIONS:  - Encourage pt to monitor pain and request assistance  - Assess pain using appropriate pain scale  - Administer analgesics based on type and severity of pain and evaluate response  - Implement non-pharmacological measures as appropriate and evaluate response  - Consider cultural and social influences on pain and pain management  - Manage/alleviate anxiety  - Utilize distraction and/or relaxation techniques  - Monitor for opioid side effects  - Notify MD/LIP if interventions unsuccessful or patient reports new pain  - Anticipate increased pain with activity and pre-medicate as appropriate  Outcome: Progressing

## 2024-06-12 NOTE — PROGRESS NOTES
[facility]   part of [facility]     CV Surgery Progress Note    Ilda Gutierrez Patient Status:  Inpatient    1955 MRN [id number]   Location [facility] 6NE-A Attending Jp Maynard MD   Hosp Day # 1 PCP Kasey Mendenhall MD     Subjective:  Patient sitting in chair, reports feeling ok. Pain is controlled. Nausea earlier, better now.     Tele: SR    Objective:  /69 (BP Location: Right arm)   Pulse 88   Temp 99 °F (37.2 °C) (Pulmonary Artery)   Resp 15   Ht 5' 8\" (1.727 m)   Wt 172 lb 6.4 oz (78.2 kg)   SpO2 90%   BMI 26.21 kg/m²     Intake/Output:    Intake/Output Summary (Last 24 hours) at 2024 0858  Last data filed at 2024 0800  Gross per 24 hour   Intake 5393.84 ml   Output 2329 ml   Net 3064.84 ml       Labs:  Lab Results   Component Value Date    WBC 11.6 2024    RBC 3.97 2024    HGB 8.6 2024    HCT 27.7 2024    MCV 69.8 2024    MCH 21.7 2024    MCHC 31.0 2024    RDW 19.8 2024    .0 2024     Lab Results   Component Value Date     2024    K 4.0 2024     2024    CO2 23.0 2024    BUN 15 2024    CREATSERUM 0.74 2024     2024    CA 8.4 2024     Lab Results   Component Value Date    INR 1.27 (H) 2024    INR 1.04 2024       Studies:  CXR 24:  Small left pleural effusion/atelectasis     Physical Exam:  General: VSS, A&Ox3, In NAD  Neck: No JVD. Sandy Level/Cordis in RIJ  Lungs: clear anteriorly   Heart: S1,S2 RRR. +rub. Sternum Stable   Abdomen: Soft, non-tender  Extremities: Warm, dry, mild generalized edema  Skin: sternotomy incision C/D/I, LE with ACE wrap   Neuro: no focal deficits     Assessment/Plan:   CAD s/p CABGx3 with LIMA-LAD, SVG-OM and PDA POD#1  -HD stable, off support, de-line   -EKG c/w inflammatory changes  -Leukocytosis, likely reactive, afebrile- monitor   -Anemia, new diagnosis pre-op- will need w/u outpatient    -Mild vol OL, likely eventual diuresis   -Renal function intact, good UOP  -Bowel regimen   -Pain management, prn norco  -Nausea, prn zofran/scopolamine patch   -Discontinue chest tubes   -Keep PW for now   -GI PPX: protonix   -DVT PPX: TEDs/SCDs  -Encourage IS/ambulation   -PT/OT/Cardiac rehab   -CCU monitoring     -D/W Dr. Maynard/Dave Garcia PA-C  Cardiothoracic Surgery   6/12/2024  8:58 AM

## 2024-06-12 NOTE — PLAN OF CARE
Assumed care at 1930. Aleksandra is very pleasant, alert & oriented. Pain controlled with PCA pump. Albumin given at start of shift for SBP <90. Able to wean dobs off at 0400. NSR w/ PVCs on the monitor. Lungs sounds clear/diminished at bases. Tolerating CLD. Insulin gtt titrated per protocol. Up to chair with 20cc dump in chest tubes. All questions answered. Plan of care discussed with Aleksandra at bedside.

## 2024-06-12 NOTE — OCCUPATIONAL THERAPY NOTE
OCCUPATIONAL THERAPY EVALUATION - INPATIENT     Room Number: 6607/6607-A  Evaluation Date: 6/12/2024  Type of Evaluation: Initial  Presenting Problem: CABG 6/11    Physician Order: IP Consult to Occupational Therapy  Reason for Therapy: ADL/IADL Dysfunction and Discharge Planning    OCCUPATIONAL THERAPY ASSESSMENT   Patient is currently functioning near baseline with toileting, upper body dressing, lower body dressing, and transfers. Prior to admission, patient's baseline is independent.  Patient is requiring stand-by assist and contact guard assist as a result of the following impairments: decreased endurance. Occupational Therapy will continue to follow for duration of hospitalization.    Patient will benefit from continued skilled OT Services For duration of hospitalization, however, given the patient is functioning near baseline level do not anticipate skilled therapy needs at discharge       History Related to Current Admission: Patient is a 69 year old female admitted on 6/11/2024 with Presenting Problem: CABG 6/11. Co-Morbidities : CAD, DM    WEIGHT BEARING RESTRICTION  Weight Bearing Restriction: None                Recommendations for nursing staff:   Transfers:  supervision  Toileting location:  toilet    EVALUATION SESSION:  Patient Start of Session: seated  FUNCTIONAL TRANSFER ASSESSMENT  Sit to Stand: Chair  Chair: Stand-by Assist    BED MOBILITY  Supine to Sit : Not tested  Sit to Supine (OT): Not Tested    COGNITION  Overall Cognitive Status:  WFL - within functional limits    Upper Extremity   ROM: within functional limits   Strength: within functional limits   EDUCATION PROVIDED  Patient: Role of Occupational Therapy; Plan of Care; Functional Transfer Techniques; Surgical Precautions; Posture/Positioning  Patient's Response to Education: Verbalized Understanding    Equipment used: none       Therapist comments: seated in the chair. Educated the pt about sternal precautions, role of OT, plan of care.   Verbalized understanding.Seated , 93% room air.  SBA to stand and to ambulate without device.    SBA dynamic standing balance while pt was adjusting her robe.      Patient End of Session: Up in chair;Needs met;Call light within reach;RN aware of session/findings;All patient questions and concerns addressed    OCCUPATIONAL PROFILE    HOME SITUATION  Type of Home: House  Home Layout: Two level  Lives With: Spouse (2 dogs)               Occupation/Status: retired, enjoys walking     Drives: Yes       Prior Level of Function: independent with ADL and IADL. Pt enjoys going for walks. Pt has 2 large dogs, but her granddaughter will be caring for them.        PAIN ASSESSMENT  Rating: 3  Location: \"2-3\" surgical site  Management Techniques: Body mechanics    OBJECTIVE  Precautions: Sternal;Cardiac  Fall Risk: Standard fall risk      ASSESSMENTS    AM-PAC ‘6-Clicks’ Inpatient Daily Activity Short Form  -   Putting on and taking off regular lower body clothing?: A Little  -   Bathing (including washing, rinsing, drying)?: A Little  -   Toileting, which includes using toilet, bedpan or urinal? : A Little  -   Putting on and taking off regular upper body clothing?: A Little  -   Taking care of personal grooming such as brushing teeth?: None  -   Eating meals?: None    AM-PAC Score:  Score: 20  Approx Degree of Impairment: 38.32%  Standardized Score (AM-PAC Scale): 42.03    ADDITIONAL TESTS     NEUROLOGICAL FINDINGS      COGNITION ASSESSMENTS       PLAN  OT Treatment Plan: Balance activities;Energy conservation/work simplification techniques;ADL training;Functional transfer training;UE strengthening/ROM;Patient/Family education;Patient/Family training;Equipment eval/education;Compensatory technique education  Rehab Potential : Good  Frequency: 3x/week  Number of Visits to Meet Established Goals: 3    ADL Goals   Patient will perform grooming: with supervision  Patient will perform upper body dressing:  with  supervision  Patient will perform lower body dressing:  with supervision  Patient will perform toileting: with supervision    Functional Transfer Goals  Patient will transfer to toilet:  with supervision    UE Exercise Program Goal  Patient will be independent with bilateral AROM HEP (home exercise program).    Additional Goals  Pt will incorporate 2 energy conservation techniques into ADL.    Patient Evaluation Complexity Level:   Occupational Profile/Medical History LOW - Brief history including review of medical or therapy records    Specific performance deficits impacting engagement in ADL/IADL MODERATE  3 - 5 performance deficits   Client Assessment/Performance Deficits MODERATE - Comorbidities and min to mod modifications of tasks    Clinical Decision Making LOW - Analysis of occupational profile, problem-focused assessments, limited treatment options    Overall Complexity LOW     OT Session Time: 19 minutes  Self-Care Home Management: 8 minutes

## 2024-06-12 NOTE — PROGRESS NOTES
[facility]   part of [facility]     Hospitalist Progress Note     Ilda Gutierrez Patient Status:  Inpatient    1955 MRN [id number]   Location [facility] 6NE-A Attending Jp Maynard MD   Hosp Day # 1 PCP Kasey Mendenhall MD     Chief Complaint: Multivessel CAD    Subjective:     Patient up in the chair. Does report incisional pain and just took pain medication. No SOB and has been using IS.    Objective:    Review of Systems:   A comprehensive review of systems was completed; pertinent positive and negatives stated in subjective.    Vital signs:  Temp:  [96.6 °F (35.9 °C)-99.8 °F (37.7 °C)] 99 °F (37.2 °C)  Pulse:  [63-96] 88  Resp:  [11-26] 15  BP: ()/(51-84) 116/69  SpO2:  [90 %-100 %] 90 %  AO: ()/(44-67) 92/58  FiO2 (%):  [40 %-50 %] 40 %    Physical Exam:    General: No acute distress, awake and alert  Respiratory: No wheezes, no rhonchi  Chest: Sternotomy site c/d/i  Cardiovascular: S1, S2, regular rate and rhythm  Abdomen: Soft, Non-tender, non-distended, positive bowel sounds  Extremities: No pitting edema    Diagnostic Data:    Labs:  Recent Labs   Lab 24  1442 24  1359 24  0400   WBC  --  10.2 11.6*   HGB  --  9.6* 8.6*   MCV  --  69.3* 69.8*   PLT  --  234.0 229.0   INR 1.04 1.27*  --      Recent Labs   Lab 24  1359 24  0400   * 121*   BUN 12 15   CREATSERUM 0.68 0.74   CA 8.3* 8.4*    145   K 3.7 4.0   * 115*   CO2 23.0 23.0     Estimated Creatinine Clearance: 72.4 mL/min (based on SCr of 0.74 mg/dL).    No results for input(s): \"TROP\", \"TROPHS\", \"CK\" in the last 168 hours.    Recent Labs   Lab 24  1442 24  1359   PTP 13.6 16.0*   INR 1.04 1.27*      Microbiology  No results found for this visit on 24.    Imaging: Reviewed in Epic.    Medications:    scopolamine  1 patch Transdermal Q72H    aspirin  81 mg Oral Daily    rosuvastatin  10 mg Oral Nightly    acetaminophen  1,000 mg Intravenous Q6H     sennosides  8.6 mg Oral BID    docusate sodium  100 mg Oral BID    vancomycin  15 mg/kg Intravenous Q12H    metoprolol tartrate  12.5 mg Oral 2x Daily(Beta Blocker)    mupirocin  1 Application Nasal BID    pantoprazole  40 mg Intravenous QAM AC    Or    pantoprazole  40 mg Oral QAM AC    ceFAZolin  2 g Intravenous Q8H       Assessment & Plan:      #CAD status post CABG x 3 on 6/11/24  -CV surgery primary  -Cardiology following  -Aspirin, beta-blocker, statin  -Lasix x 1 today     #Type 2 diabetes with A1c 6.6  -Stop insulin gtt and start ISS     #Hypertension  -Start BB  -Hold home lisinopril    #Hyperlipidemia  -Crestor 10 mg daily    #Iron deficiency anemia  -Outpatient evaluation once recovers from surgery    Taiwo Bustos DO    Supplementary Documentation:     Quality:  DVT Mechanical Prophylaxis: GRACE hose, Verito,    DVT Pharmacologic Prophylaxis   Medication   None         DVT Pharmacologic prophylaxis: Aspirin 81 mg  Code Status: Full  Perera: Perera catheter in place  ARMANDO: TBD    Discharge is dependent on: Clinical state, CV surgery and cardiology recs  At this point Ms. Gutierrez is expected to be discharge to: Home    The 21st Century Cures Act makes medical notes like these available to patients in the interest of transparency. Please be advised this is a medical document. Medical documents are intended to carry relevant information, facts as evident, and the clinical opinion of the practitioner. The medical note is intended as peer to peer communication and may appear blunt or direct. It is written in medical language and may contain abbreviations or verbiage that are unfamiliar.

## 2024-06-12 NOTE — PHYSICAL THERAPY NOTE
PHYSICAL THERAPY EVALUATION - INPATIENT     Room Number: 6607/6607-A  Evaluation Date: 6/12/2024  Type of Evaluation: Initial  Physician Order: PT Eval and Treat    Presenting Problem: S/p CABG x3 on 6/11  Co-Morbidities : CAD, DM  Reason for Therapy: Mobility Dysfunction and Discharge Planning    PHYSICAL THERAPY ASSESSMENT   Patient is currently functioning below baseline with bed mobility, transfers, gait, and stair negotiation.  Prior to admission, patient's baseline is independent w/ adl's and gait w/o device.  Patient is requiring supervision and contact guard assist as a result of the following impairments: decreased functional strength, decreased endurance/aerobic capacity, pain, decreased muscular endurance, and medical status.  Physical Therapy will continue to follow for duration of hospitalization.    Patient will benefit from continued skilled PT Services For duration of hospitalization, however, given the patient is functioning near baseline level do not anticipate skilled therapy needs at discharge .    PLAN  PT Treatment Plan: Bed mobility;Body mechanics;Energy conservation;Patient education;Family education;Gait training;Stair training;Strengthening;Transfer training;Balance training  Rehab Potential : Good  Frequency (Obs): 3-5x/week  Number of Visits to Meet Established Goals: 5      CURRENT GOALS    Goal #1 Patient is able to demonstrate supine - sit EOB @ level: modified independent     Goal #2 Patient is able to demonstrate transfers EOB to/from BSC at assistance level: modified independent     Goal #3 Patient is able to ambulate 300 feet with assist device: none at assistance level: supervision     Goal #4 Pt will complete flight of stairs w/ use of railing and supervision assist.     Goal #5    Goal #6    Goal Comments: Goals established on 6/12/2024      PHYSICAL THERAPY MEDICAL/SOCIAL HISTORY  History related to current admission: Patient is a 69 year old female admitted on 6/11/2024  from home for planned CABG x3.       HOME SITUATION  Type of Home: House   Home Layout: Two level  Stairs to Enter : 0     Stairs to Bedroom: 14  Railing: Yes    Lives With: Spouse (2 dogs)  Drives: Yes  Patient Owned Equipment: None       Prior Level of [place]: Pt is typically independent w/ adl's, gait w/o device, drives and enjoys reading in her free time.  Has two dogs, reports family will be able to walk dogs in order for her to remain compliant with her sternal precautions.      SUBJECTIVE  \"A little unsteady, but not dizzy\" - pt's report while walking.      OBJECTIVE  Precautions: Sternal;Cardiac  Fall Risk: Standard fall risk    WEIGHT BEARING RESTRICTION  Weight Bearing Restriction: None                PAIN ASSESSMENT  Rating: 3  Location: Chest/surgical site  Management Techniques: Activity promotion;Body mechanics;Repositioning    COGNITION  Overall Cognitive Status:  WFL - within functional limits    RANGE OF MOTION AND STRENGTH ASSESSMENT  Upper extremity ROM and strength -see OT evaluation    Lower extremity ROM is within functional limits     Lower extremity strength is within functional limits       BALANCE  Static Sitting: Good  Dynamic Sitting: Good  Static Standing: Fair  Dynamic Standing: Fair    ADDITIONAL TESTS                                    ACTIVITY TOLERANCE  Pulse: 106  Heart Rate Source: Monitor  Resp: 21  BP: 137/62  BP Location: Right arm  BP Method: Automatic  Patient Position: Sitting    O2 WALK  Oxygen Therapy  SPO2% on Room Air at Rest: 93    NEUROLOGICAL FINDINGS                        AM-PAC '6-Clicks' INPATIENT SHORT FORM - BASIC MOBILITY  How much difficulty does the patient currently have...  Patient Difficulty: Turning over in bed (including adjusting bedclothes, sheets and blankets)?: A Little   Patient Difficulty: Sitting down on and standing up from a chair with arms (e.g., wheelchair, bedside commode, etc.): A Little   Patient Difficulty: Moving from lying on back  to sitting on the side of the bed?: A Little   How much help from another person does the patient currently need...   Help from Another: Moving to and from a bed to a chair (including a wheelchair)?: A Little   Help from Another: Need to walk in hospital room?: A Little   Help from Another: Climbing 3-5 steps with a railing?: A Little       AM-PAC Score:  Raw Score: 18   Approx Degree of Impairment: 46.58%   Standardized Score (AM-PAC Scale): 43.63   CMS Modifier (G-Code): CK    FUNCTIONAL ABILITY STATUS  Gait Assessment   Functional Mobility/Gait Assessment  Gait Assistance: Contact guard assist  Distance (ft): 100  Assistive Device: None  Pattern: Within Functional Limits (Decreased arm swing, slow ella)    Skilled Therapy Provided   BP taken and stable prior to initiating gait.    Transfer Mobility:  Sit to stand: Pt educated on using le's to drive anti-gravity movement from sit<>stand - pt completed with supervision assist.   Stand to sit: Cues for safety technique and supervision assist.  Gait = Completed gait 100'x1 w/o device and CGA.  Pt w/ steady gait pattern, but note guarded  movement w/ decreased arm swing.    Therapist's Comments: Pt instructed in lifting restrictions and techniques to accommodate them.  Pt educated on hourly ankle pumps and reinforced use of incentive spirometer.  Pt reports feeling comfortable in bedside recliner.    Patient End of Session: Up in chair;Needs met;Call light within reach;RN aware of session/findings;All patient questions and concerns addressed (RN's Alvino aware of eval session)      Patient Evaluation Complexity Level:  History Low - no personal factors and/or co-morbidities   Examination of body systems Moderate - addressing a total of 3 or more elements   Clinical Presentation Moderate - Evolving   Clinical Decision Making Moderate - Evolving       PT Session Time: 24 minutes  Gait Training: 10 minutes  Therapeutic Activity: 3 minutes  Neuromuscular  Re-education: 0 minutes  Therapeutic Exercise: 1 minutes

## 2024-06-12 NOTE — CARDIAC REHAB
CAD education complete cardiac rehab offered/ explained but decline at this time, information given if she decides at a later time binder at bedside.

## 2024-06-12 NOTE — PHYSICAL THERAPY NOTE
Physical Therapy    Order for PT eval received.  Will await pt being de-lined prior to initiating eval.  Will re-attempt as pt's medical stability and schedule allows.

## 2024-06-12 NOTE — PROGRESS NOTES
[facility] Cardiology  Progress Note    Ilda Gutierrez Patient Status:  Inpatient    1955 MRN [id number]   [facility] 6NE-A Attending Jp Maynard MD   Hosp Day # 1 PCP Kasey Mendenhall MD     Subjective:  POD #1 CABG.  Operative findings reviewed with Dr Maynard.  Up in chair.  Nausea.  No chest pain.              Intake/Output:    Intake/Output Summary (Last 24 hours) at 2024 0747  Last data filed at 2024 0714  Gross per 24 hour   Intake 5393.84 ml   Output 2275 ml   Net 3118.84 ml       Wt Readings from Last 6 Encounters:   24 172 lb 6.4 oz (78.2 kg)   24 171 lb (77.6 kg)   24 173 lb (78.5 kg)   18 180 lb (81.6 kg)             Physical Exam:  Blood pressure 130/65, pulse 84, temperature 99.2 °F (37.3 °C), temperature source Pulmonary Artery, resp. rate 12, height 5' 8\" (1.727 m), weight 172 lb 6.4 oz (78.2 kg), SpO2 92%.PA 25/7. CO 4.6, CI 2.4.  General: Alert, in no apparent distress.  HEENT: No scleral icterus.  MMM.  Neck: No JVD.  Cordis, SG RIJ.  Cardiac: Regular S1, S2, no murmur or gallop.  + rub.  Lungs: Diminished at bases, otherwise clear.    Abdomen: Soft, non-tender.   Extremities: Without clubbing, cyanosis.  Mild diffuse edema.   Neurologic: No focal defecits.  Skin: No rashes. Incisions c,d, i    Laboratory/Data:  Diagnostics:   EKG: diffuse pericardial reaction.          CXR: SG in LPA, no PTX.    Labs:  Lab Results   Component Value Date    WBC 11.6 2024    HGB 8.6 2024    HCT 27.7 2024    .0 2024     Lab Results   Component Value Date     2024    K 4.0 2024     2024    CO2 23.0 2024    BUN 15 2024    CREATSERUM 0.74 2024     Recent Labs   Lab 24  1442 24  1359   PTP 13.6 16.0*   INR 1.04 1.27*         Medications:  Reviewed.    Assessment and Plan:  Principal Problem:    S/P CABG x 3  Active Problems:    Type 2 diabetes mellitus without complication,  without long-term current use of insulin (HCC)    Hypercholesterolemia    Anemia    Coronary artery disease involving native coronary artery of native heart with unstable angina pectoris (HCC)      POD #1 CABG x3 for severe 3 vessel CAD including distal L main.  Doing well early post-op.  Diurese.  Lines out per Dr Maynard.  Begin BB.     DM, HTN, High cholesterol.  Resume pre-op medications as able.    Anemia.  Newly diagnosed at time of workup for new onset angina.  Will need evaluation once recovers from CV surgery.    Keep in CCU today.  D/W bedside nursing and pt.  D/W Dr Maynard.    Francisco Saleem MD  6/12/2024  7:47 AM  3

## 2024-06-13 LAB
ANION GAP SERPL CALC-SCNC: 7 MMOL/L (ref 0–18)
BUN BLD-MCNC: 17 MG/DL (ref 9–23)
CALCIUM BLD-MCNC: 8.5 MG/DL (ref 8.5–10.1)
CHLORIDE SERPL-SCNC: 110 MMOL/L (ref 98–112)
CO2 SERPL-SCNC: 23 MMOL/L (ref 21–32)
CREAT BLD-MCNC: 0.8 MG/DL
EGFRCR SERPLBLD CKD-EPI 2021: 80 ML/MIN/1.73M2 (ref 60–?)
ERYTHROCYTE [DISTWIDTH] IN BLOOD BY AUTOMATED COUNT: 21 %
GLUCOSE BLD-MCNC: 152 MG/DL (ref 70–99)
GLUCOSE BLD-MCNC: 153 MG/DL (ref 70–99)
GLUCOSE BLD-MCNC: 180 MG/DL (ref 70–99)
GLUCOSE BLD-MCNC: 180 MG/DL (ref 70–99)
GLUCOSE BLD-MCNC: 195 MG/DL (ref 70–99)
HCT VFR BLD AUTO: 27.9 %
HGB BLD-MCNC: 8.3 G/DL
MCH RBC QN AUTO: 21.7 PG (ref 26–34)
MCHC RBC AUTO-ENTMCNC: 29.7 G/DL (ref 31–37)
MCV RBC AUTO: 73 FL
OSMOLALITY SERPL CALC.SUM OF ELEC: 295 MOSM/KG (ref 275–295)
PLATELET # BLD AUTO: 199 10(3)UL (ref 150–450)
POTASSIUM SERPL-SCNC: 4.2 MMOL/L (ref 3.5–5.1)
RBC # BLD AUTO: 3.82 X10(6)UL
SODIUM SERPL-SCNC: 140 MMOL/L (ref 136–145)
WBC # BLD AUTO: 11.1 X10(3) UL (ref 4–11)

## 2024-06-13 PROCEDURE — 99232 SBSQ HOSP IP/OBS MODERATE 35: CPT | Performed by: INTERNAL MEDICINE

## 2024-06-13 RX ORDER — FUROSEMIDE 10 MG/ML
40 INJECTION INTRAMUSCULAR; INTRAVENOUS ONCE
Status: COMPLETED | OUTPATIENT
Start: 2024-06-13 | End: 2024-06-13

## 2024-06-13 RX ORDER — AMIODARONE HYDROCHLORIDE 200 MG/1
400 TABLET ORAL DAILY
Status: DISCONTINUED | OUTPATIENT
Start: 2024-06-15 | End: 2024-06-15

## 2024-06-13 RX ORDER — METOPROLOL TARTRATE 1 MG/ML
5 INJECTION, SOLUTION INTRAVENOUS EVERY 6 HOURS PRN
Status: DISCONTINUED | OUTPATIENT
Start: 2024-06-13 | End: 2024-06-15

## 2024-06-13 RX ORDER — METOPROLOL TARTRATE 50 MG/1
50 TABLET, FILM COATED ORAL
Status: DISCONTINUED | OUTPATIENT
Start: 2024-06-14 | End: 2024-06-15

## 2024-06-13 RX ORDER — AMIODARONE HYDROCHLORIDE 200 MG/1
400 TABLET ORAL 3 TIMES DAILY
Status: COMPLETED | OUTPATIENT
Start: 2024-06-13 | End: 2024-06-14

## 2024-06-13 NOTE — PLAN OF CARE
Shift Note:  Assumed care of patient around 0830 from CCU.   Patient alert and oriented x4. Belongings at the bedside.   Patient on room air, denies difficulty breathing, lung sounds diminished.   Denies any cardiac symptoms, NSR on tele.   Mild pain to sternal incision -- pain medication as ordered.   Continent of bowel and bladder, last BM 6/11.   Ambulates independently, call light within reach, tolerating care well.     1410:  Patient into Afib RVR, rates 140-150s- Cardiology and CV surg updated.   EKG conform. Orders for Amio TID, PRN Lopressor.     POC:  - Daily weight   - Pacerwires in place   - ABX q8  - DC planning, HH setup.

## 2024-06-13 NOTE — H&P
RE:     Ilda COLEMAN  :1955       Mrs. Coleman is a very pleasant 69-year-old female patient who has been having clear-cut episodes of ischemia manifested by exertional dyspnea which is clearly different than her norm.  As such, she has now undergone stress testing which was significantly positive.  Based on her stress testing, she has now undergone cardiac catheterization which you and I had the chance to review together.  Catheterization demonstrates severe ostial stenosis of the right coronary artery and severe distal left main coronary stenosis prior to a reasonable LAD and obtuse marginal.  Left ventricular function is preserved.  There is no valvular pathology.  The rhythm is sinus.  The patient clearly need coronary bypass surgery.       Past medical history is notable for diabetes, hypertension, and dyslipidemia which have been reasonably well managed.  She is a nonsmoker.  She has no other significant medical illnesses.  There is no stroke, cancer, TB, ulcer disease, or vein stripping.       On review of systems, the patient has no complaints of blurred vision or hearing problems.  The patient denies palpitations, wheezing, syncope, or dizziness.  There are no symptoms of GI reflux, urgency or frequency of urination, or rashes.  The patient has normal mobility.  The patient denies any history of bleeding disorders.     Physical examination shows an alert female in no distress.  Pupils are equal and round.  Carotid pulses are equal bilaterally.  There are no bruits.  The lungs are clear.  Cardiac examination shows a regular rhythm.  S1 and S2 are normal.  There is no murmur.  There is no S3.  Examination of the abdomen reveals a benign finding.  Examination of the extremities shows no cyanosis, clubbing, or edema.     We have discussed the nature of the patient's cardiovascular condition that being severe symptomatic coronary artery disease with objective evidence of ischemia.  We have discussed  options for treatment; clearly she needs coronary bypass operation to the LAD, obtuse, and right.       The patient understands this.  We will get her on the schedule in the early part of the coming week.       Thank you for allowing us to see this patient in consultation.     Sincerely,    Jp Maynard M.D.  JONATHAN/cds

## 2024-06-13 NOTE — PROGRESS NOTES
[facility] Cardiology  Progress Note    Ilda Gutierrez Patient Status:  Inpatient    1955 MRN [id number]   [facility] 8NE-A Attending Jp Maynard MD   Hosp Day # 2 PCP Kasey Mendenhall MD     Subjective:  POD #2 CABG.  Developed atrial fibrillation with rapid ventricular response earlier today.  Sitting up in chair, visiting with family.  No chest pain.              Intake/Output:    Intake/Output Summary (Last 24 hours) at 2024 1825  Last data filed at 2024 1601  Gross per 24 hour   Intake 840 ml   Output 950 ml   Net -110 ml       Wt Readings from Last 6 Encounters:   24 173 lb 11.2 oz (78.8 kg)   24 171 lb (77.6 kg)   24 173 lb (78.5 kg)   18 180 lb (81.6 kg)             Physical Exam:  Blood pressure 102/51, pulse (!) 131, temperature 98.4 °F (36.9 °C), temperature source Oral, resp. rate 26, height 5' 8\" (1.727 m), weight 173 lb 11.2 oz (78.8 kg), SpO2 91%.  General: Alert, in no apparent distress.  HEENT: No scleral icterus.  MMM.  Neck: No JVD.  Cardiac: Irregular S1, S2, no murmur, rub or gallop.  Lungs: Clear.    Abdomen: Soft, non-tender.   Extremities: Without clubbing, cyanosis or edema.   Neurologic: No focal defecits.  Skin: No rashes.     Laboratory/Data:  Diagnostics:   EKG: A-fib with RVR.      Labs:  Lab Results   Component Value Date    WBC 11.1 2024    HGB 8.3 2024    HCT 27.9 2024    .0 2024     Lab Results   Component Value Date     2024    K 4.2 2024     2024    CO2 23.0 2024    BUN 17 2024    CREATSERUM 0.80 2024     Recent Labs   Lab 24  1359   PTP 16.0*   INR 1.27*         Medications:  Reviewed.    Assessment and Plan:  Principal Problem:    S/P CABG x 3  Active Problems:    Type 2 diabetes mellitus without complication, without long-term current use of insulin (HCC)    Hypercholesterolemia    Anemia    Coronary artery disease involving native coronary  artery of native heart with unstable angina pectoris (HCC)  Postoperative atrial fibrillation with rapid ventricular response.    Reviewed with CV surgery.  Patient started on oral amiodarone loading.  Will also begin oral beta-blocker and use IV as needed Lopressor as well for rate control.  Continue medical therapy and risk factor modification.  If atrial fibrillation persist, given elevated CHADS2 score, will need to begin systemic anticoagulation when okay with Dr. Maynard.  Discussed with patient, her  and daughter in detail.  Reviewed with bedside nursing.    Francisco Saleem MD  6/13/2024  6:25 PM  3

## 2024-06-13 NOTE — PROGRESS NOTES
[facility]   part of [facility]     Hospitalist Progress Note     Ilda Gutierrez Patient Status:  Inpatient    1955 MRN [id number]   Location [facility] 6NE-A Attending Jp Maynard MD   Hosp Day # 2 PCP Kasey Mendenhall MD     Chief Complaint: Multivessel CAD    Subjective:     Patient states she feels pretty good and is hoping to go home tomorrow.     Objective:    Review of Systems:   A comprehensive review of systems was completed; pertinent positive and negatives stated in subjective.    Vital signs:  Temp:  [98.4 °F (36.9 °C)-99.1 °F (37.3 °C)] 99.1 °F (37.3 °C)  Pulse:  [] 86  Resp:  [13-27] 24  BP: (107-143)/(47-86) 132/54  SpO2:  [92 %-96 %] 94 %    Physical Exam:    General: No acute distress, awake and alert  Respiratory: No wheezes, no rhonchi  Chest: Sternotomy site c/d/i  Cardiovascular: S1, S2, regular rate and rhythm  Abdomen: Soft, Non-tender, non-distended, positive bowel sounds  Extremities: No pitting edema    Diagnostic Data:    Labs:  Recent Labs   Lab 24  1442 24  1359 24  0400 24  0455   WBC  --  10.2 11.6* 11.1*   HGB  --  9.6* 8.6* 8.3*   MCV  --  69.3* 69.8* 73.0*   PLT  --  234.0 229.0 199.0   INR 1.04 1.27*  --   --      Recent Labs   Lab 24  1359 24  0400 24  0455   * 121* 153*   BUN 12 15 17   CREATSERUM 0.68 0.74 0.80   CA 8.3* 8.4* 8.5    145 140   K 3.7 4.0 4.2   * 115* 110   CO2 23.0 23.0 23.0     Estimated Creatinine Clearance: 67 mL/min (based on SCr of 0.8 mg/dL).    No results for input(s): \"TROP\", \"TROPHS\", \"CK\" in the last 168 hours.    Recent Labs   Lab 24  1442 24  1359   PTP 13.6 16.0*   INR 1.04 1.27*      Microbiology  No results found for this visit on 24.    Imaging: Reviewed in Epic.    Medications:    scopolamine  1 patch Transdermal Q72H    insulin aspart  1-10 Units Subcutaneous TID AC and HS    ceFAZolin  2 g Intravenous Q8H    aspirin  81 mg Oral Daily     rosuvastatin  10 mg Oral Nightly    sennosides  8.6 mg Oral BID    docusate sodium  100 mg Oral BID    metoprolol tartrate  12.5 mg Oral 2x Daily(Beta Blocker)    mupirocin  1 Application Nasal BID    pantoprazole  40 mg Intravenous QAM AC    Or    pantoprazole  40 mg Oral QAM AC       Assessment & Plan:      #CAD status post CABG x 3 on 6/11/24  -CV surgery primary  -Cardiology following  -Aspirin, beta-blocker, statin  -Lasix x 1 given     #Type 2 diabetes with A1c 6.6  -ISS     #Hypertension  -Started on BB  -Hold home lisinopril    #Hyperlipidemia  -Crestor 10 mg daily    #Iron deficiency anemia  -Outpatient evaluation once recovers from surgery    Taiwo Bustos,     Supplementary Documentation:     Quality:  DVT Mechanical Prophylaxis: GRACE hose, SCDs,    DVT Pharmacologic Prophylaxis   Medication   None         DVT Pharmacologic prophylaxis: Aspirin 81 mg  Code Status: Full  Perera: No urinary catheter in place  ARMANDO: 1-2 days    Discharge is dependent on: Clinical state, CV surgery and cardiology recs  At this point Ms. Gutierrez is expected to be discharge to: Home    The 21st Century Cures Act makes medical notes like these available to patients in the interest of transparency. Please be advised this is a medical document. Medical documents are intended to carry relevant information, facts as evident, and the clinical opinion of the practitioner. The medical note is intended as peer to peer communication and may appear blunt or direct. It is written in medical language and may contain abbreviations or verbiage that are unfamiliar.

## 2024-06-13 NOTE — PLAN OF CARE
Assumed care at 1930. No complaints overnight. Able to sleep comfortably. Norco given prn for pain as requested. Pacer wires taped. Ambulating to bathroom standby assist. Plan of care updated with Aleksandra at bedside.

## 2024-06-13 NOTE — PROGRESS NOTES
[facility]   part of [facility]     CV Surgery Progress Note    Ilda Gutierrez Patient Status:  Inpatient    1955 MRN [id number]   Location [facility] 6NE-A Attending Jp Maynard MD   Hosp Day # 2 PCP Kasey Mendenhall MD     Subjective:  Patient reports feeling well. Pain is controlled. Denies any dyspnea. Ambulating well.       Objective:  /54 (BP Location: Left arm)   Pulse 86   Temp 99.1 °F (37.3 °C) (Oral)   Resp 24   Ht 5' 8\" (1.727 m)   Wt 173 lb 11.2 oz (78.8 kg)   SpO2 94%   BMI 26.41 kg/m²     Intake/Output:    Intake/Output Summary (Last 24 hours) at 2024 0948  Last data filed at 2024 0600  Gross per 24 hour   Intake 130 ml   Output 650 ml   Net -520 ml       Labs:  Lab Results   Component Value Date    WBC 11.1 2024    RBC 3.82 2024    HGB 8.3 2024    HCT 27.9 2024    MCV 73.0 2024    MCH 21.7 2024    MCHC 29.7 2024    RDW 21.0 2024    .0 2024     Lab Results   Component Value Date     2024    K 4.2 2024     2024    CO2 23.0 2024    BUN 17 2024    CREATSERUM 0.80 2024     2024    CA 8.5 2024     Lab Results   Component Value Date    INR 1.27 (H) 2024    INR 1.04 2024     Physical Exam:  General: VSS, A&Ox3, In NAD  Neck: No JVD.  Lungs: clear anteriorly   Heart: S1,S2 RRR. Sternum Stable   Abdomen: Soft, non-tender  Extremities: Warm, dry, trace generalized edema  Skin: sternotomy incision C/D/I, LE incision C/D/I  Neuro: no focal deficits     Assessment/Plan:   CAD s/p CABGx3 with LIMA-LAD, SVG-OM and PDA POD#2  -HD stable, off support  -EKG c/w inflammatory changes  -Leukocytosis, likely reactive, afebrile- monitor   -Anemia, new diagnosis pre-op, Hgb stable- will need w/u outpatient   -Mild vol OL, weight up from baseline- lasix x1 today  -Renal function intact, good UOP  -Bowel regimen   -Pain management, prn  norco  -Nausea, prn zofran/scopolamine patch   -UA with wbcs, continue ancef for now   -Chest tubes removed   -Keep PW for now   -GI PPX: protonix   -DVT PPX: TEDs/SCDs  -Encourage IS/ambulation   -PT/OT/Cardiac rehab      -D/W Dr. Maynard/Dave Garcia PA-LEAH   Cardiothoracic Surgery   6/13/2024  1:29 PM

## 2024-06-14 LAB
ANION GAP SERPL CALC-SCNC: 6 MMOL/L (ref 0–18)
ATRIAL RATE: 122 BPM
ATRIAL RATE: 82 BPM
BUN BLD-MCNC: 16 MG/DL (ref 9–23)
CALCIUM BLD-MCNC: 8.7 MG/DL (ref 8.5–10.1)
CHLORIDE SERPL-SCNC: 107 MMOL/L (ref 98–112)
CO2 SERPL-SCNC: 25 MMOL/L (ref 21–32)
CREAT BLD-MCNC: 0.71 MG/DL
EGFRCR SERPLBLD CKD-EPI 2021: 92 ML/MIN/1.73M2 (ref 60–?)
ERYTHROCYTE [DISTWIDTH] IN BLOOD BY AUTOMATED COUNT: 21.2 %
GLUCOSE BLD-MCNC: 139 MG/DL (ref 70–99)
GLUCOSE BLD-MCNC: 152 MG/DL (ref 70–99)
GLUCOSE BLD-MCNC: 168 MG/DL (ref 70–99)
GLUCOSE BLD-MCNC: 196 MG/DL (ref 70–99)
GLUCOSE BLD-MCNC: 200 MG/DL (ref 70–99)
HCT VFR BLD AUTO: 27 %
HGB BLD-MCNC: 8.2 G/DL
MCH RBC QN AUTO: 21.5 PG (ref 26–34)
MCHC RBC AUTO-ENTMCNC: 30.4 G/DL (ref 31–37)
MCV RBC AUTO: 70.9 FL
OSMOLALITY SERPL CALC.SUM OF ELEC: 289 MOSM/KG (ref 275–295)
P AXIS: 16 DEGREES
P-R INTERVAL: 168 MS
PLATELET # BLD AUTO: 250 10(3)UL (ref 150–450)
POTASSIUM SERPL-SCNC: 3.7 MMOL/L (ref 3.5–5.1)
Q-T INTERVAL: 310 MS
Q-T INTERVAL: 374 MS
QRS DURATION: 80 MS
QRS DURATION: 82 MS
QTC CALCULATION (BEZET): 436 MS
QTC CALCULATION (BEZET): 448 MS
R AXIS: -23 DEGREES
R AXIS: -23 DEGREES
RBC # BLD AUTO: 3.81 X10(6)UL
SODIUM SERPL-SCNC: 138 MMOL/L (ref 136–145)
T AXIS: -1 DEGREES
T AXIS: 24 DEGREES
VENTRICULAR RATE: 126 BPM
VENTRICULAR RATE: 82 BPM
WBC # BLD AUTO: 9.3 X10(3) UL (ref 4–11)

## 2024-06-14 PROCEDURE — 99233 SBSQ HOSP IP/OBS HIGH 50: CPT | Performed by: INTERNAL MEDICINE

## 2024-06-14 RX ORDER — POTASSIUM CHLORIDE 20 MEQ/1
40 TABLET, EXTENDED RELEASE ORAL ONCE
Status: COMPLETED | OUTPATIENT
Start: 2024-06-14 | End: 2024-06-14

## 2024-06-14 RX ORDER — HYDROCODONE BITARTRATE AND ACETAMINOPHEN 5; 325 MG/1; MG/1
1-2 TABLET ORAL EVERY 4 HOURS PRN
Qty: 30 TABLET | Refills: 0 | Status: SHIPPED | OUTPATIENT
Start: 2024-06-14 | End: 2024-06-21 | Stop reason: ALTCHOICE

## 2024-06-14 RX ORDER — FUROSEMIDE 10 MG/ML
40 INJECTION INTRAMUSCULAR; INTRAVENOUS ONCE
Status: COMPLETED | OUTPATIENT
Start: 2024-06-14 | End: 2024-06-14

## 2024-06-14 NOTE — CM/SW NOTE
SW sent updates to [facility] via [facility]. Awaiting discharge. Per RN, possible weekend discharge. Will have to send AVS to [facility].     [facility] [place]  P: [phone]  F: [phone]    Violeta Celeste, MSW, LSW  Discharge Planner  [id number]

## 2024-06-14 NOTE — OCCUPATIONAL THERAPY NOTE
OCCUPATIONAL THERAPY TREATMENT NOTE - INPATIENT     Room Number: 8605/8605-A  Session: 1   Number of Visits to Meet Established Goals: 3    Presenting Problem: CABG   Prior Level of Function: independent with ADL and IADL. Pt enjoys going for walks. Pt has 2 large dogs, but her granddaughter will be caring for them.    ASSESSMENT   Pt reports no concerns for ADLs or functional transfers. Reviewed with pt and family energy conservation/work simplification strategies. Referred to CV binder.    Patient would benefit from home at discharge.           OT Device Recommendations: None    History: Patient is a 69 year old female admitted on 2024 with Presenting Problem: CABG . Co-Morbidities : CAD, DM    WEIGHT BEARING RESTRICTION  Weight Bearing Restriction: None            TREATMENT SESSION:  Patient Start of Session: seated in chair; family present at bedside  FUNCTIONAL TRANSFER ASSESSMENT  Sit to Stand: Chair  Chair: Not Tested    BED MOBILITY  Supine to Sit : Not tested  Sit to Supine (OT): Not Tested    BALANCE ASSESSMENT     FUNCTIONAL ADL ASSESSMENT                              O2 SATURATIONS       EDUCATION PROVIDED  Patient: Role of Occupational Therapy; Plan of Care; Discharge Recommendations; Energy Conservation  Patient's Response to Education: Verbalized Understanding      Therapist comments: see Assessment above.  Patient End of Session: Up in chair;Needs met;Call light within reach;RN aware of session/findings;All patient questions and concerns addressed;Family present    SUBJECTIVE  \"They reviewed all that in the binder.\" - referring to exercises and safe functional transfers/mobility    PAIN ASSESSMENT  Ratin  Location: denies  Management Techniques: Body mechanics     OBJECTIVE  Precautions: Sternal;Cardiac    AM-PAC ‘6-Clicks’ Inpatient Daily Activity Short Form  -   Putting on and taking off regular lower body clothing?: A Little  -   Bathing (including washing, rinsing, drying)?: A  Little  -   Toileting, which includes using toilet, bedpan or urinal? : A Little  -   Putting on and taking off regular upper body clothing?: A Little  -   Taking care of personal grooming such as brushing teeth?: None  -   Eating meals?: None    AM-PAC Score:  Score: 20  Approx Degree of Impairment: 38.32%  Standardized Score (AM-PAC Scale): 42.03    PLAN  OT Treatment Plan: Balance activities;Energy conservation/work simplification techniques;ADL training;Functional transfer training;UE strengthening/ROM;Patient/Family education;Patient/Family training;Equipment eval/education;Compensatory technique education  Rehab Potential : Good  Frequency: 3x/week    OT Goals:     All goals ongoing 06/14    ADL Goals   Patient will perform grooming: with supervision  Patient will perform upper body dressing:  with supervision  Patient will perform lower body dressing:  with supervision  Patient will perform toileting: with supervision     Functional Transfer Goals  Patient will transfer to toilet:  with supervision     UE Exercise Program Goal  Patient will be independent with bilateral AROM HEP (home exercise program).     Additional Goals  Pt will incorporate 2 energy conservation techniques into ADL.    Pt with no ADL concerns.    OT Session Time: 10 minutes  Therapeutic Activity: 10 minutes

## 2024-06-14 NOTE — PROGRESS NOTES
Progress Note  Ilda Gutierrez Patient Status:  Inpatient    1955 MRN [id number]   [facility] 8NE-A Attending Jp Maynard MD   Hosp Day # 3 PCP Kasey Mendenhall MD     Subjective:  Sitting at side of bed. A little winded after getting up to BR. Denies cp. Pain well controlled.    Objective:  /68 (BP Location: Left arm)   Pulse 73   Temp 98.5 °F (36.9 °C) (Oral)   Resp 25   Ht 5' 8\" (1.727 m)   Wt 172 lb 2.9 oz (78.1 kg)   SpO2 94%   BMI 26.18 kg/m²     Telemetry: nsr      Intake/Output:    Intake/Output Summary (Last 24 hours) at 2024 0934  Last data filed at 2024 0914  Gross per 24 hour   Intake 720 ml   Output 550 ml   Net 170 ml       Last 3 Weights   24 0456 172 lb 2.9 oz (78.1 kg)   24 0500 173 lb 11.2 oz (78.8 kg)   24 0500 172 lb 6.4 oz (78.2 kg)   24 0835 165 lb (74.8 kg)   24 1714 170 lb (77.1 kg)   24 1242 171 lb (77.6 kg)   24 1057 173 lb (78.5 kg)       Labs:  Recent Labs   Lab 24  0400 24  0455 24  0503   * 153* 139*   BUN 15 17 16   CREATSERUM 0.74 0.80 0.71   EGFRCR 88 80 92   CA 8.4* 8.5 8.7    140 138   K 4.0 4.2 3.7   * 110 107   CO2 23.0 23.0 25.0     Recent Labs   Lab 24  0400 24  0455 24  0503   RBC 3.97 3.82 3.81   HGB 8.6* 8.3* 8.2*   HCT 27.7* 27.9* 27.0*   MCV 69.8* 73.0* 70.9*   MCH 21.7* 21.7* 21.5*   MCHC 31.0 29.7* 30.4*   RDW 19.8 21.0 21.2   NEPRELIM 9.81*  --   --    WBC 11.6* 11.1* 9.3   .0 199.0 250.0         No results for input(s): \"TROP\", \"TROPHS\", \"CK\" in the last 168 hours.    Review of Systems   Cardiovascular:  Positive for dyspnea on exertion.   Respiratory: Negative.         Physical Exam:    Gen: alert, oriented x 3, NAD  Heent: pupils equal, reactive. Mucous membranes moist.   Neck: no jvd  Cardiac: regular rate and rhythm, normal S1,S2  Lungs: dim bases.   Abd: soft, NT/ND +bs  Ext: no edema  Skin: Warm, dry. Sternum  steri strips cdi.   Neuro: No focal deficits        Medications:     furosemide  40 mg Intravenous Once    [START ON 6/15/2024] amiodarone  400 mg Oral Daily    metoprolol tartrate  50 mg Oral 2x Daily(Beta Blocker)    scopolamine  1 patch Transdermal Q72H    insulin aspart  1-10 Units Subcutaneous TID AC and HS    ceFAZolin  2 g Intravenous Q8H    aspirin  81 mg Oral Daily    rosuvastatin  10 mg Oral Nightly    sennosides  8.6 mg Oral BID    docusate sodium  100 mg Oral BID    mupirocin  1 Application Nasal BID    pantoprazole  40 mg Oral QAM AC             Assessment:  Pod 3 s/p CABG x3v (lima-lad, svg-om, pda)-6/11/24  Preserved LVEF  PAF->NSR  Amio, bb  Will need to consider oral AC if recurrence.   Leukocytosis-resolved.  Acute on chronic anemia--stable  Mild vol OL-diuresing.   HTN-controlled  HL-statin, LDL 55, at goal  DMII-A1c 6.65    Plan:  EKG nsr without acute ischemic changes  Labs overall stable  I/Os inaccurate. Weight remains above baseline. Agree with IV diuretic today   Will plan for 14 day MCT after dc to assess for afib recurrence.   Cont po bb, amio, asa, statin.   Anticipate possible dc tomorrow.     Plan of care discussed with patient, RN.    Татьяна Vang, KAI  6/14/2024  9:34 AM  [phone]  [facility] [phone]        Patient seen and examined independently.  Note reviewed and labs reviewed. Agree with above assessment and plan.  69-year-old female status post CABG x 3, LIMA-LAD, SVG-OM, PDA.  Clinically progressing.  On amiodarone and beta-blocker therapy.  Currently not on anticoagulation.  If repeat A-fib episode is noted, would recommend full dose anticoagulation.  Volume status still up.  Recommend IV diuretic today.  Hopeful discharge tomorrow.  Will continue to follow.        Zackery Bustos DO  Cardiologist  [facility]  6/14/2024 12:26 PM      Note to the patient: The 21st Century Cures Act makes medical notes like these available to patients in the  interest of transparency. However, be advised that this is a medical document. It is intended as peer to peer communication. It is written in medical language and may contain abbreviations or verbiage that are unfamiliar. It may appear blunt or direct. Medical documents are intended to carry relevant information, facts as evident, and clinical opinion of the practitioner.     Disclaimer: Components of this note were documented using voice recognition system and are subject to errors not corrected at proofreading. Contact the author of this note for any clarifications.

## 2024-06-14 NOTE — PROGRESS NOTES
[facility]   part of [facility]     Hospitalist Progress Note     Ilda Gutierrez Patient Status:  Inpatient    1955 MRN [id number]   Location [facility] 6NE-A Attending Jp Maynard MD   Hosp Day # 3 PCP Kasey Mendenhall MD     Chief Complaint: Multivessel CAD    Subjective:     Patient states she is overall feeling well.  She did ambulate today.  Went to atrial fibrillation with RVR yesterday and back in normal sinus since early this morning.    Objective:    Review of Systems:   A comprehensive review of systems was completed; pertinent positive and negatives stated in subjective.    Vital signs:  Temp:  [98.4 °F (36.9 °C)-98.9 °F (37.2 °C)] 98.5 °F (36.9 °C)  Pulse:  [] 71  Resp:  [15-26] 16  BP: (102-125)/(51-73) 112/60  SpO2:  [91 %-94 %] 94 %    Physical Exam:    General: No acute distress, awake and alert  Respiratory: No wheezes, no rhonchi  Chest: Sternotomy site c/d/i  Cardiovascular: S1, S2, regular rate and rhythm  Abdomen: Soft, Non-tender, non-distended, positive bowel sounds  Extremities: Trace LE edema    Diagnostic Data:    Labs:  Recent Labs   Lab 24  1359 24  0400 24  0455 24  0503   WBC 10.2 11.6* 11.1* 9.3   HGB 9.6* 8.6* 8.3* 8.2*   MCV 69.3* 69.8* 73.0* 70.9*   .0 229.0 199.0 250.0   INR 1.27*  --   --   --      Recent Labs   Lab 24  0400 24  0455 24  0503   * 153* 139*   BUN 15 17 16   CREATSERUM 0.74 0.80 0.71   CA 8.4* 8.5 8.7    140 138   K 4.0 4.2 3.7   * 110 107   CO2 23.0 23.0 25.0     Estimated Creatinine Clearance: 75.4 mL/min (based on SCr of 0.71 mg/dL).    No results for input(s): \"TROP\", \"TROPHS\", \"CK\" in the last 168 hours.    Recent Labs   Lab 24  1359   PTP 16.0*   INR 1.27*      Microbiology  No results found for this visit on 24.    Imaging: Reviewed in Epic.    Medications:    [START ON 6/15/2024] amiodarone  400 mg Oral Daily    metoprolol tartrate  50 mg Oral  2x Daily(Beta Blocker)    scopolamine  1 patch Transdermal Q72H    insulin aspart  1-10 Units Subcutaneous TID AC and HS    ceFAZolin  2 g Intravenous Q8H    aspirin  81 mg Oral Daily    rosuvastatin  10 mg Oral Nightly    sennosides  8.6 mg Oral BID    docusate sodium  100 mg Oral BID    mupirocin  1 Application Nasal BID    pantoprazole  40 mg Oral QAM AC       Assessment & Plan:      #CAD status post CABG x 3 on 6/11/24  -CV surgery primary  -Cardiology following  -Aspirin, beta-blocker, statin  -Lasix x 1 again today    #Postop atrial fibrillation with RVR now back in sinus rhythm  -PO amiodarone started, beta-blocker     #Type 2 diabetes with A1c 6.6  -ISS     #Hypertension  -Started on BB  -Hold home lisinopril    #Hyperlipidemia  -Crestor 10 mg daily    #Iron deficiency anemia  -Outpatient evaluation once recovers from surgery    Taiwo Bustos,     Supplementary Documentation:     Quality:  DVT Mechanical Prophylaxis: GRACE hose, SCDs,    DVT Pharmacologic Prophylaxis   Medication   None         DVT Pharmacologic prophylaxis: Aspirin 81 mg  Code Status: Full  Perera: No urinary catheter in place  ARMANDO: 1 day    Discharge is dependent on: Clinical state, CV surgery and cardiology recs  At this point Ms. Gutierrez is expected to be discharge to: Home    The 21st Century Cures Act makes medical notes like these available to patients in the interest of transparency. Please be advised this is a medical document. Medical documents are intended to carry relevant information, facts as evident, and the clinical opinion of the practitioner. The medical note is intended as peer to peer communication and may appear blunt or direct. It is written in medical language and may contain abbreviations or verbiage that are unfamiliar.

## 2024-06-14 NOTE — PLAN OF CARE
Patient is alert and oriented times four. Patient is now sinus rhythm on monitor. Chest and left leg incisions dry and intact. Lungs clear on auscultation.Patient is anxious to go home. Norco given for incisional pain.

## 2024-06-14 NOTE — PROGRESS NOTES
[facility]   part of [facility]     CV Surgery Progress Note    Ilda Gutierrez Patient Status:  Inpatient    1955 MRN [id number]   Location [facility] 6NE-A Attending Jp Maynard MD   Hosp Day # 3 PCP Kasey Mendenhall MD     Subjective:  Patient reports feeling good. Converted to SR early this morning. Denies feeling any palpitations. Ambulating well.    Tele: Afib, SR    Objective:  /68 (BP Location: Left arm)   Pulse 73   Temp 98.5 °F (36.9 °C) (Oral)   Resp 25   Ht 5' 8\" (1.727 m)   Wt 172 lb 2.9 oz (78.1 kg)   SpO2 94%   BMI 26.18 kg/m²     Intake/Output:    Intake/Output Summary (Last 24 hours) at 2024 0925  Last data filed at 2024 0914  Gross per 24 hour   Intake 720 ml   Output 550 ml   Net 170 ml       Labs:  Lab Results   Component Value Date    WBC 9.3 2024    RBC 3.81 2024    HGB 8.2 2024    HCT 27.0 2024    MCV 70.9 2024    MCH 21.5 2024    MCHC 30.4 2024    RDW 21.2 2024    .0 2024     Lab Results   Component Value Date     2024    K 3.7 2024     2024    CO2 25.0 2024    BUN 16 2024    CREATSERUM 0.71 2024     2024    CA 8.7 2024     Lab Results   Component Value Date    INR 1.27 (H) 2024    INR 1.04 2024     Physical Exam:  General: VSS, A&Ox3, In NAD  Neck: No JVD.  Lungs: clear anteriorly   Heart: S1,S2 RRR. Sternum Stable   Abdomen: Soft, non-tender  Extremities: Warm, dry, no edema  Skin: sternotomy incision C/D/I, LE incision C/D/I  Neuro: no focal deficits     Assessment/Plan:   CAD s/p CABGx3 with LIMA-LAD, SVG-OM and PDA POD#3  -HD stable, off support  -EKG c/w inflammatory changes, resolved  -PAF, currently SR- on po amio and bb  -Leukocytosis, likely reactive, afebrile, resolved- monitor   -Anemia, new diagnosis pre-op, Hgb stable- will need w/u outpatient   -Mild vol OL, weight up from baseline- re-dose  lasix today   -Renal function intact, good UOP  -Bowel regimen   -Pain management, prn norco  -Nausea, prn zofran/scopolamine patch   -UA with wbcs, continue ancef for now- will discuss plan for discharge   -Chest tubes removed   -Discontinue PW  -GI PPX: protonix   -DVT PPX: TEDs/SCDs  -Encourage IS/ambulation   -PT/OT/Cardiac rehab   -Anticipate discharge tomorrow      -D/W Dr. Maynard/Dave    Addendum: Ancef for two more doses, no need for outpatient antibiotic.     Jyotsna Garcia PA-C   Cardiothoracic Surgery   6/14/2024  9:28 AM

## 2024-06-15 VITALS
OXYGEN SATURATION: 95 % | TEMPERATURE: 99 F | DIASTOLIC BLOOD PRESSURE: 58 MMHG | HEIGHT: 68 IN | RESPIRATION RATE: 23 BRPM | WEIGHT: 172.38 LBS | SYSTOLIC BLOOD PRESSURE: 124 MMHG | BODY MASS INDEX: 26.13 KG/M2 | HEART RATE: 83 BPM

## 2024-06-15 LAB
ANION GAP SERPL CALC-SCNC: 5 MMOL/L (ref 0–18)
BUN BLD-MCNC: 15 MG/DL (ref 9–23)
CALCIUM BLD-MCNC: 8.6 MG/DL (ref 8.5–10.1)
CHLORIDE SERPL-SCNC: 106 MMOL/L (ref 98–112)
CO2 SERPL-SCNC: 26 MMOL/L (ref 21–32)
CREAT BLD-MCNC: 0.72 MG/DL
EGFRCR SERPLBLD CKD-EPI 2021: 90 ML/MIN/1.73M2 (ref 60–?)
ERYTHROCYTE [DISTWIDTH] IN BLOOD BY AUTOMATED COUNT: 21.6 %
GLUCOSE BLD-MCNC: 130 MG/DL (ref 70–99)
GLUCOSE BLD-MCNC: 136 MG/DL (ref 70–99)
HCT VFR BLD AUTO: 27.6 %
HGB BLD-MCNC: 8.3 G/DL
MCH RBC QN AUTO: 21.6 PG (ref 26–34)
MCHC RBC AUTO-ENTMCNC: 30.1 G/DL (ref 31–37)
MCV RBC AUTO: 71.7 FL
OSMOLALITY SERPL CALC.SUM OF ELEC: 287 MOSM/KG (ref 275–295)
PLATELET # BLD AUTO: 263 10(3)UL (ref 150–450)
POTASSIUM SERPL-SCNC: 3.9 MMOL/L (ref 3.5–5.1)
POTASSIUM SERPL-SCNC: 3.9 MMOL/L (ref 3.5–5.1)
RBC # BLD AUTO: 3.85 X10(6)UL
SODIUM SERPL-SCNC: 137 MMOL/L (ref 136–145)
WBC # BLD AUTO: 7.2 X10(3) UL (ref 4–11)

## 2024-06-15 PROCEDURE — 99232 SBSQ HOSP IP/OBS MODERATE 35: CPT | Performed by: INTERNAL MEDICINE

## 2024-06-15 RX ORDER — POLYETHYLENE GLYCOL 3350 17 G/17G
17 POWDER, FOR SOLUTION ORAL DAILY PRN
Qty: 14 EACH | Refills: 0 | Status: SHIPPED | OUTPATIENT
Start: 2024-06-15

## 2024-06-15 RX ORDER — METOPROLOL TARTRATE 50 MG/1
50 TABLET, FILM COATED ORAL
Qty: 60 TABLET | Refills: 3 | Status: SHIPPED | OUTPATIENT
Start: 2024-06-15

## 2024-06-15 RX ORDER — AMIODARONE HYDROCHLORIDE 200 MG/1
200 TABLET ORAL DAILY
Qty: 30 TABLET | Refills: 3 | Status: SHIPPED | OUTPATIENT
Start: 2024-06-16

## 2024-06-15 RX ORDER — DOCUSATE SODIUM 100 MG/1
100 CAPSULE, LIQUID FILLED ORAL 2 TIMES DAILY PRN
Qty: 30 CAPSULE | Refills: 0 | Status: SHIPPED | OUTPATIENT
Start: 2024-06-15

## 2024-06-15 NOTE — PROGRESS NOTES
Progress Note  Ilda Gutierrez Patient Status:  Inpatient    1955 MRN [id number]   [facility] 8NE-A Attending Jp Maynard MD   Hosp Day # 4 PCP Kasey Mendenhall MD     Subjective:  Doing well. No complaints. Eager to go home.      Objective:  /62   Pulse 76   Temp 98.3 °F (36.8 °C) (Oral)   Resp 23   Ht 5' 8\" (1.727 m)   Wt 172 lb 6.4 oz (78.2 kg)   SpO2 96%   BMI 26.21 kg/m²         Intake/Output:    Intake/Output Summary (Last 24 hours) at 6/15/2024 0837  Last data filed at 6/15/2024 0601  Gross per 24 hour   Intake 640 ml   Output 1050 ml   Net -410 ml       Last 3 Weights   06/15/24 0430 172 lb 6.4 oz (78.2 kg)   24 0456 172 lb 2.9 oz (78.1 kg)   24 0500 173 lb 11.2 oz (78.8 kg)   24 0500 172 lb 6.4 oz (78.2 kg)   24 0835 165 lb (74.8 kg)   24 1714 170 lb (77.1 kg)   24 1242 171 lb (77.6 kg)   24 1057 173 lb (78.5 kg)       Labs:  Recent Labs   Lab 24  0455 24  0503 06/15/24  0439   * 139* 130*   BUN 17 16 15   CREATSERUM 0.80 0.71 0.72   EGFRCR 80 92 90   CA 8.5 8.7 8.6    138 137   K 4.2 3.7 3.9  3.9    107 106   CO2 23.0 25.0 26.0     Recent Labs   Lab 24  0400 24  0455 24  0503 06/15/24  0439   RBC 3.97 3.82 3.81 3.85   HGB 8.6* 8.3* 8.2* 8.3*   HCT 27.7* 27.9* 27.0* 27.6*   MCV 69.8* 73.0* 70.9* 71.7*   MCH 21.7* 21.7* 21.5* 21.6*   MCHC 31.0 29.7* 30.4* 30.1*   RDW 19.8 21.0 21.2 21.6   NEPRELIM 9.81*  --   --   --    WBC 11.6* 11.1* 9.3 7.2   .0 199.0 250.0 263.0         No results for input(s): \"TROP\", \"TROPHS\", \"CK\" in the last 168 hours.    Review of Systems   Cardiovascular:  Positive for dyspnea on exertion.   Respiratory: Negative.         Physical Exam:    Gen: alert, oriented x 3, NAD, stable   Heent: pupils equal, reactive. Mucous membranes moist.   Neck: no jvd  Cardiac: regular rate and rhythm, normal S1,S2  Lungs: clear bilaterally   Abd: soft, NT/ND  +bs  Ext: no edema  Skin: Warm, dry. Sternum steri strips cdi.   Neuro: No focal deficits        Medications:     amiodarone  400 mg Oral Daily    metoprolol tartrate  50 mg Oral 2x Daily(Beta Blocker)    scopolamine  1 patch Transdermal Q72H    insulin aspart  1-10 Units Subcutaneous TID AC and HS    aspirin  81 mg Oral Daily    rosuvastatin  10 mg Oral Nightly    sennosides  8.6 mg Oral BID    docusate sodium  100 mg Oral BID    mupirocin  1 Application Nasal BID    pantoprazole  40 mg Oral QAM AC             Assessment:  Pod 4 s/p CABG x3v (lima-lad, svg-om, pda)-6/11/24  Preserved LVEF  PAF->NSR  Amio, bb  Will need to consider oral AC if recurrence.   Leukocytosis-resolved.  Acute on chronic anemia--stable  Mild vol OL-diuresing.   HTN-controlled  HL-statin, LDL 55, at goal  DMII-A1c 6.65    Plan:  Clinically improved from a volume standpoint - renal function stable. Plan for 3 day outpatient course of diuretics - 20 mg every day.  Will plan for 14 day MCT after dc to assess for afib recurrence.   Cont po bb, amio, asa, statin.   Anticipate possible dc today with outpatient follow up      Plan of care discussed with patient, RN.      Zackery Bustos DO  Cardiologist  [facility] [place]  6/15/2024 8:37 AM      Note to the patient: The 21st Century Cures Act makes medical notes like these available to patients in the interest of transparency. However, be advised that this is a medical document. It is intended as peer to peer communication. It is written in medical language and may contain abbreviations or verbiage that are unfamiliar. It may appear blunt or direct. Medical documents are intended to carry relevant information, facts as evident, and clinical opinion of the practitioner.     Disclaimer: Components of this note were documented using voice recognition system and are subject to errors not corrected at proofreading. Contact the author of this note for any clarifications.

## 2024-06-15 NOTE — PLAN OF CARE
Nursing discharge note  Pt discharge home, IV removed, Tele DC and returned to monitor tech. F/U instructions provided and discussed. Pt and family verbalized understanding. Prescriptions given, discussed adverse reactions and side effects of all new medications, and provided appropriate handouts. Pt and family verbalized understanding. Pt wheeled down with all belongings. Pt denies C/O pain, malaise , or cardiac S/S. All needs met by staff.

## 2024-06-15 NOTE — PROGRESS NOTES
[facility]   part of [facility]     Hospitalist Progress Note     Ilda Gutierrez Patient Status:  Inpatient    1955 MRN [id number]   Location [facility] 6NE-A Attending Jp Maynard MD   Hosp Day # 4 PCP Kasey Mendenhall MD     Chief Complaint: Multivessel CAD    Subjective:     Patient denies chest pain or SOB. Incisional pain controlled with meds.    Objective:    Review of Systems:   A comprehensive review of systems was completed; pertinent positive and negatives stated in subjective.    Vital signs:  Temp:  [98.2 °F (36.8 °C)-99.6 °F (37.6 °C)] 98.3 °F (36.8 °C)  Pulse:  [] 76  Resp:  [13-29] 23  BP: (107-125)/(53-68) 124/62  SpO2:  [94 %-97 %] 96 %    Physical Exam:    General: No acute distress, awake and alert  Respiratory: No wheezes, no rhonchi  Chest: Sternotomy site c/d/i  Cardiovascular: S1, S2, regular rate and rhythm  Abdomen: Soft, Non-tender, non-distended, positive bowel sounds  Extremities: No pitting edema    Diagnostic Data:    Labs:  Recent Labs   Lab 24  1359 24  0400 24  0455 24  0503 06/15/24  0439   WBC 10.2 11.6* 11.1* 9.3 7.2   HGB 9.6* 8.6* 8.3* 8.2* 8.3*   MCV 69.3* 69.8* 73.0* 70.9* 71.7*   .0 229.0 199.0 250.0 263.0   INR 1.27*  --   --   --   --      Recent Labs   Lab 24  0455 24  0503 06/15/24  0439   * 139* 130*   BUN 17 16 15   CREATSERUM 0.80 0.71 0.72   CA 8.5 8.7 8.6    138 137   K 4.2 3.7 3.9  3.9    107 106   CO2 23.0 25.0 26.0     Estimated Creatinine Clearance: 74.4 mL/min (based on SCr of 0.72 mg/dL).    No results for input(s): \"TROP\", \"TROPHS\", \"CK\" in the last 168 hours.    Recent Labs   Lab 24  1359   PTP 16.0*   INR 1.27*      Microbiology  No results found for this visit on 24.    Imaging: Reviewed in Epic.    Medications:    amiodarone  400 mg Oral Daily    metoprolol tartrate  50 mg Oral 2x Daily(Beta Blocker)    scopolamine  1 patch Transdermal Q72H     insulin aspart  1-10 Units Subcutaneous TID AC and HS    aspirin  81 mg Oral Daily    rosuvastatin  10 mg Oral Nightly    sennosides  8.6 mg Oral BID    docusate sodium  100 mg Oral BID    mupirocin  1 Application Nasal BID    pantoprazole  40 mg Oral QAM AC       Assessment & Plan:      #CAD status post CABG x 3 on 6/11/24  -CV surgery primary  -Cardiology following  -Aspirin, beta-blocker, statin  -S/p IV lasix    #Postop atrial fibrillation with RVR now back in sinus rhythm  -PO amiodarone started, beta-blocker  -14 day MCT on discharge     #Type 2 diabetes with A1c 6.6  -ISS     #Hypertension  -Started on BB  -Hold home lisinopril    #Hyperlipidemia  -Crestor 10 mg daily    #Iron deficiency anemia  -Outpatient evaluation once recovers from surgery    DC planning for today?    Taiwo Bustos, DO    Supplementary Documentation:     Quality:  DVT Mechanical Prophylaxis: GRACE hose, SCDs,    DVT Pharmacologic Prophylaxis   Medication   None         DVT Pharmacologic prophylaxis: Aspirin 81 mg  Code Status: Full  Perera: No urinary catheter in place  ARMANDO: Today?    Discharge is dependent on: Clinical state, CV surgery and cardiology recs  At this point Ms. Gutierrez is expected to be discharge to: Home    The 21st Century Cures Act makes medical notes like these available to patients in the interest of transparency. Please be advised this is a medical document. Medical documents are intended to carry relevant information, facts as evident, and the clinical opinion of the practitioner. The medical note is intended as peer to peer communication and may appear blunt or direct. It is written in medical language and may contain abbreviations or verbiage that are unfamiliar.

## 2024-06-15 NOTE — PLAN OF CARE
Patient alert and oriented x 4. Up  SBA. On RA with adequate O2 saturations. NSR on tele. Complains of moderate incisional pain, prn pain medication given. Continent of bowel and bladder.  Mid sternal and LLE incision site with steri strips C/D/I, painted with Betadine. No complaints of shortness of breath or chest pain/discomfort. POC : JANA, IV Ancef, DC planning. Fall precautions in place. Call light within reach.    Problem: Diabetes/Glucose Control  Goal: Glucose maintained within prescribed range  Description: INTERVENTIONS:  - Monitor Blood Glucose as ordered  - Assess for signs and symptoms of hyperglycemia and hypoglycemia  - Administer ordered medications to maintain glucose within target range  - Assess barriers to adequate nutritional intake and initiate nutrition consult as needed  - Instruct patient on self management of diabetes  Outcome: Progressing     Problem: Patient/Family Goals  Goal: Patient/Family Long Term Goal  Description: Patient's Long Term Goal: dc home    Interventions:  - work with PT/OT   -follow up with outpatient MD's   -cardiac rehab  - See additional Care Plan goals for specific interventions  Outcome: Progressing  Goal: Patient/Family Short Term Goal  Description: Patient's Short Term Goal: sleep    Interventions:   - limit noise  - cluster care  -provide sleeping kit   - See additional Care Plan goals for specific interventions  Outcome: Progressing     Problem: CARDIOVASCULAR - ADULT  Goal: Maintains optimal cardiac output and hemodynamic stability  Description: INTERVENTIONS:  - Monitor vital signs, rhythm, and trends  - Monitor for bleeding, hypotension and signs of decreased cardiac output  - Evaluate effectiveness of vasoactive medications to optimize hemodynamic stability  - Monitor arterial and/or venous puncture sites for bleeding and/or hematoma  - Assess quality of pulses, skin color and temperature  - Assess for signs of decreased coronary artery perfusion - ex.  Angina  - Evaluate fluid balance, assess for edema, trend weights  Outcome: Progressing  Goal: Absence of cardiac arrhythmias or at baseline  Description: INTERVENTIONS:  - Continuous cardiac monitoring, monitor vital signs, obtain 12 lead EKG if indicated  - Evaluate effectiveness of antiarrhythmic and heart rate control medications as ordered  - Initiate emergency measures for life threatening arrhythmias  - Monitor electrolytes and administer replacement therapy as ordered  Outcome: Progressing     Problem: PAIN - ADULT  Goal: Verbalizes/displays adequate comfort level or patient's stated pain goal  Description: INTERVENTIONS:  - Encourage pt to monitor pain and request assistance  - Assess pain using appropriate pain scale  - Administer analgesics based on type and severity of pain and evaluate response  - Implement non-pharmacological measures as appropriate and evaluate response  - Consider cultural and social influences on pain and pain management  - Manage/alleviate anxiety  - Utilize distraction and/or relaxation techniques  - Monitor for opioid side effects  - Notify MD/LIP if interventions unsuccessful or patient reports new pain  - Anticipate increased pain with activity and pre-medicate as appropriate  Outcome: Progressing

## 2024-06-15 NOTE — PLAN OF CARE
Assumed patient care, patient is alert and oriented x4. Sinus rhythm on tele, no complains of chest pain. Mid sternal and left lower leg incision site with steri strip are intact, dry, and clean, painted with betadine. Patient did not have bowel movement for a few days, scheduled bowel movement medicine given. Patient will discharge to home today, plan of care updated, questions are answered. Bed in lower position, call light within reach.     Problem: Diabetes/Glucose Control  Goal: Glucose maintained within prescribed range  Description: INTERVENTIONS:  - Monitor Blood Glucose as ordered  - Assess for signs and symptoms of hyperglycemia and hypoglycemia  - Administer ordered medications to maintain glucose within target range  - Assess barriers to adequate nutritional intake and initiate nutrition consult as needed  - Instruct patient on self management of diabetes  Outcome: Progressing

## 2024-06-15 NOTE — PROGRESS NOTES
[facility]   [facility] Progress Note    Ilda Gutierrez Patient Status:  Inpatient    1955 MRN [id number]   [facility] 8NE-A Attending Jp Maynard MD   Hosp Day # 4 PCP Kasey Mendenhall MD     Subjective:  Up in chair , no further episodes of Afib . No pain walking hallways. Feels she could go home today .     Objective:  /62   Pulse 76   Temp 98.3 °F (36.8 °C) (Oral)   Resp 23   Ht 172.7 cm (5' 8\")   Wt 78.2 kg (172 lb 6.4 oz)   SpO2 96%   BMI 26.21 kg/m²          Intake/Output:    Intake/Output Summary (Last 24 hours) at 6/15/2024 0815  Last data filed at 6/15/2024 0601  Gross per 24 hour   Intake 640 ml   Output 1050 ml   Net -410 ml           Last 3 Weights   06/15/24 0430 78.2 kg (172 lb 6.4 oz)   24 0456 78.1 kg (172 lb 2.9 oz)   24 0500 78.8 kg (173 lb 11.2 oz)   24 0500 78.2 kg (172 lb 6.4 oz)   24 0835 74.8 kg (165 lb)   24 1714 77.1 kg (170 lb)   24 1242 77.6 kg (171 lb)   24 1057 78.5 kg (173 lb)           Allergies:  No Known Allergies    Current Facility-Administered Medications   Medication Dose Route Frequency    metoprolol (Lopressor) 5 mg/5mL injection 5 mg  5 mg Intravenous Q6H PRN    amiodarone (Pacerone) tab 400 mg  400 mg Oral Daily    metoprolol tartrate (Lopressor) tab 50 mg  50 mg Oral 2x Daily(Beta Blocker)    HYDROcodone-acetaminophen (Norco) 5-325 MG per tab 1 tablet  1 tablet Oral Q4H PRN    Or    HYDROcodone-acetaminophen (Norco) 5-325 MG per tab 2 tablet  2 tablet Oral Q4H PRN    scopolamine (Transderm-Scop) 1 MG/3DAYS patch 1 patch  1 patch Transdermal Q72H    insulin aspart (NovoLOG) 100 Units/mL FlexPen 1-10 Units  1-10 Units Subcutaneous TID AC and HS    glucose (Dex4) 15 GM/59ML oral liquid 15 g  15 g Oral Q15 Min PRN    Or    glucose (Glutose) 40% oral gel 15 g  15 g Oral Q15 Min PRN    Or    glucose-vitamin C (Dex-4) chewable tab 4 tablet  4 tablet Oral Q15 Min PRN    Or    dextrose 50% injection  50 mL  50 mL Intravenous Q15 Min PRN    Or    glucose (Dex4) 15 GM/59ML oral liquid 30 g  30 g Oral Q15 Min PRN    Or    glucose (Glutose) 40% oral gel 30 g  30 g Oral Q15 Min PRN    Or    glucose-vitamin C (Dex-4) chewable tab 8 tablet  8 tablet Oral Q15 Min PRN    aspirin DR tab 81 mg  81 mg Oral Daily    rosuvastatin (Crestor) tab 10 mg  10 mg Oral Nightly    melatonin tab 3 mg  3 mg Oral Nightly PRN    sennosides (Senokot) tab 8.6 mg  8.6 mg Oral BID    docusate sodium (Colace) cap 100 mg  100 mg Oral BID    polyethylene glycol (PEG 3350) (Miralax) 17 g oral packet 17 g  17 g Oral Daily PRN    bisacodyl (Dulcolax) 10 MG rectal suppository 10 mg  10 mg Rectal Daily PRN    ondansetron (Zofran) 4 MG/2ML injection 4 mg  4 mg Intravenous Q6H PRN    mupirocin (Bactroban) 2% nasal ointment 1 Application  1 Application Nasal BID    morphINE PF 2 MG/ML injection 2 mg  2 mg Intravenous Q2H PRN    Or    morphINE PF 4 MG/ML injection 4 mg  4 mg Intravenous Q2H PRN    pantoprazole (Protonix) DR tab 40 mg  40 mg Oral QAM AC       Labs:  Lab Results   Component Value Date    WBC 7.2 06/15/2024    HGB 8.3 06/15/2024    HCT 27.6 06/15/2024    .0 06/15/2024    CREATSERUM 0.72 06/15/2024    BUN 15 06/15/2024     06/15/2024    K 3.9 06/15/2024    K 3.9 06/15/2024     06/15/2024    CO2 26.0 06/15/2024     06/15/2024    CA 8.6 06/15/2024       Physical Exam:    Neuro: NAD intact A/O X4   Lungs: Clear no distress comfortable on RA   Heart: RRR S1 S2 sternum stable   Abdomen: Soft non tender BS present no nausea   Extremities: Warm and dry no edema   Pulses: Palpable   Incisions: Sternotomy C/D/I steri HUGH LE incision C/D/I        Assessment/Plan:  Patient Active Problem List   Diagnosis    Type 2 diabetes mellitus without complication, without long-term current use of insulin (HCC)    Hypercholesterolemia    Chest pain, precordial    Abnormal stress test    Anemia    Coronary artery disease involving native  coronary artery of native heart with unstable angina pectoris (HCC)    S/P CABG x 3       POD# 4 CABG x3 LIMA- LAD , SVG - OM and PDA     - HD stable   - PAF , currently NSR Cards following PO amiodarone and BB   - Anemia , new diagnosis pre-op , HGB stable will need w/u outpatient   - Mild vol , wt stable   - Renal function intact , good UOP   - Bowel regimen constipation   - Pain management , Norco PRN   - Encourage IS/ Ambulation   - Nausea resolved   - PW out   - GI PPX protonix   - DVT PPX Dale's / SCD'S   - PT / OT / Cardiac rehab   - Discharge planning home if ok with consults     D/W Dr. Aleyda RYAN, RN  6/15/2024  8:15 AM

## 2024-06-17 ENCOUNTER — TELEPHONE (OUTPATIENT)
Dept: CARDIOLOGY UNIT | Facility: HOSPITAL | Age: 69
End: 2024-06-17

## 2024-06-17 ENCOUNTER — PATIENT OUTREACH (OUTPATIENT)
Dept: CASE MANAGEMENT | Age: 69
End: 2024-06-17

## 2024-06-17 NOTE — PROGRESS NOTES
Follow Up Phone Call    1. How are you doing now that you are home?     2. Have there been any changes in your wound/incision since going home?     3. Is your pain manageable at home?     4. Are you following the walking routine given to you in the hospital?     5. Are you continuing to use your incentive spirometer?     6. Do you have your appointments for Chest Xray?                                                                Primary MD?                                                                Cardiologist?                                                                Dr. Maynard?                                                               Cardiac Rehab?      7. Do you have any other questions or concerns today?     Patient did not answer the phone. Voice mail left.        Ilda FAIRBANKS RN  6/17/2024  12:25 PM

## 2024-06-18 NOTE — PAYOR COMM NOTE
Discharge Notification    Patient Name: KAYY COLEMAN  Payor: [employer]  Subscriber #: [id number]  Authorization Number: [id number]  Admit Date/Time: 6/11/2024 7:44 AM  Discharge Date/Time: 6/15/2024 11:40 AM

## 2024-06-21 ENCOUNTER — HOSPITAL ENCOUNTER (OUTPATIENT)
Dept: ULTRASOUND IMAGING | Age: 69
Discharge: HOME | End: 2024-06-21
Attending: STUDENT IN AN ORGANIZED HEALTH CARE EDUCATION/TRAINING PROGRAM

## 2024-06-21 ENCOUNTER — OFFICE VISIT (OUTPATIENT)
Dept: FAMILY MEDICINE CLINIC | Facility: CLINIC | Age: 69
End: 2024-06-21

## 2024-06-21 ENCOUNTER — TELEPHONE (OUTPATIENT)
Dept: FAMILY MEDICINE CLINIC | Facility: CLINIC | Age: 69
End: 2024-06-21

## 2024-06-21 VITALS
DIASTOLIC BLOOD PRESSURE: 74 MMHG | TEMPERATURE: 97 F | HEIGHT: 68 IN | RESPIRATION RATE: 16 BRPM | BODY MASS INDEX: 25.22 KG/M2 | HEART RATE: 84 BPM | SYSTOLIC BLOOD PRESSURE: 132 MMHG | WEIGHT: 166.38 LBS

## 2024-06-21 DIAGNOSIS — Z95.1 S/P CABG X 3: ICD-10-CM

## 2024-06-21 DIAGNOSIS — R22.42 LOCALIZED SWELLING OF LEFT LOWER EXTREMITY: ICD-10-CM

## 2024-06-21 DIAGNOSIS — Z09 HOSPITAL DISCHARGE FOLLOW-UP: Primary | ICD-10-CM

## 2024-06-21 DIAGNOSIS — D64.9 ANEMIA, UNSPECIFIED TYPE: ICD-10-CM

## 2024-06-21 DIAGNOSIS — E11.9 TYPE 2 DIABETES MELLITUS WITHOUT COMPLICATION, WITHOUT LONG-TERM CURRENT USE OF INSULIN (HCC): ICD-10-CM

## 2024-06-21 DIAGNOSIS — M79.605 ACUTE LEG PAIN, LEFT: ICD-10-CM

## 2024-06-21 PROCEDURE — 3078F DIAST BP <80 MM HG: CPT | Performed by: STUDENT IN AN ORGANIZED HEALTH CARE EDUCATION/TRAINING PROGRAM

## 2024-06-21 PROCEDURE — 3044F HG A1C LEVEL LT 7.0%: CPT | Performed by: STUDENT IN AN ORGANIZED HEALTH CARE EDUCATION/TRAINING PROGRAM

## 2024-06-21 PROCEDURE — 93970 EXTREMITY STUDY: CPT | Performed by: STUDENT IN AN ORGANIZED HEALTH CARE EDUCATION/TRAINING PROGRAM

## 2024-06-21 PROCEDURE — 1160F RVW MEDS BY RX/DR IN RCRD: CPT | Performed by: STUDENT IN AN ORGANIZED HEALTH CARE EDUCATION/TRAINING PROGRAM

## 2024-06-21 PROCEDURE — 99496 TRANSJ CARE MGMT HIGH F2F 7D: CPT | Performed by: STUDENT IN AN ORGANIZED HEALTH CARE EDUCATION/TRAINING PROGRAM

## 2024-06-21 PROCEDURE — 1159F MED LIST DOCD IN RCRD: CPT | Performed by: STUDENT IN AN ORGANIZED HEALTH CARE EDUCATION/TRAINING PROGRAM

## 2024-06-21 PROCEDURE — 3075F SYST BP GE 130 - 139MM HG: CPT | Performed by: STUDENT IN AN ORGANIZED HEALTH CARE EDUCATION/TRAINING PROGRAM

## 2024-06-21 PROCEDURE — 1111F DSCHRG MED/CURRENT MED MERGE: CPT | Performed by: STUDENT IN AN ORGANIZED HEALTH CARE EDUCATION/TRAINING PROGRAM

## 2024-06-21 PROCEDURE — 3008F BODY MASS INDEX DOCD: CPT | Performed by: STUDENT IN AN ORGANIZED HEALTH CARE EDUCATION/TRAINING PROGRAM

## 2024-06-21 PROCEDURE — 1170F FXNL STATUS ASSESSED: CPT | Performed by: STUDENT IN AN ORGANIZED HEALTH CARE EDUCATION/TRAINING PROGRAM

## 2024-06-21 NOTE — DISCHARGE SUMMARY
[facility]  Discharge Summary    Ilda Gutierrez Patient Status:  Inpatient    1955 MRN [id number]   Location [facility] 8NE-A Attending No att. providers found   Hosp Day # 4 PCP Kasey Mendenhall MD     Admit date: 2024    Discharge date and time: 6/15/2024 11:40 AM     Discharge Diagnoses:   CAD    Procedures Performed:   CABG    HPI & Hospital Course: Ilda Gutierrez is a 69 year old year old woman with CAD who was admitted to the hospital for CABG. Surgery was performed and recovery was uneventful. For more detailed information please see the medical record.     Discharge Condition: Stable     Discharge Instructions:  Pt was instructed not to lift anything heavy and to follow up with Dr. Maynard as directed.     Signed:  Jp Maynard MD  2024

## 2024-06-21 NOTE — PATIENT INSTRUCTIONS
Try to include more iron-rich foods in the diet, which includes fortified cereals, cream of wheat, sunflower seeds, prune juice, seeds/nuts, beans, lentils, legumes, spinach, raisins, dried prunes and peaches, beef, lamb, ham, turkey, scallops. You can look up iron-rich diets online for more information.

## 2024-06-21 NOTE — TELEPHONE ENCOUNTER
[facility] called for STAT results done today.  Impression   CONCLUSION:    1. No evidence of DVT in either leg.  2. Sluggish flow in left popliteal vein corresponds to the area of pain indicated by the patient.     Routing to PCP for review and recommendation.

## 2024-06-21 NOTE — PROGRESS NOTES
Subjective:   Ilda Gutierrez is a 69 year old female who presents for hospital follow up.   She was discharged from [facility] to Home Health Care Services  Admission Date: 6/11/24   Discharge Date: 6/15/24  Hospital Discharge Diagnosis: coronary artery disease s/p CABG x3    Interactive contact within 2 business days post discharge first initiated on Date: 6/17/2024    During the visit, the following was completed:  Obtained and reviewed discharge summary, continuity of care documents, Hospitalist notes, Cardiology notes, and Surgery Notes  Reviewed Labs (CBC, CMP), X-Ray radiology results, EKG, Echocardiogram, Operative reports: Cardiac,   WBC: 7.2, done on 6/15/2024.  HGB: 8.3, done on 6/15/2024.  PLT: 263, done on 6/15/2024.   ,   Glucose: 130, done on 6/15/2024.  Cr: 0.72, done on 6/15/2024.  CA: 8.6, done on 6/15/2024.  Na: 137, done on 6/15/2024.  K: 3.9, done on 6/15/2024.  K: 3.9, done on 6/15/2024.  Cl: 106, done on 6/15/2024.  CO2: 26, done on 6/15/2024.  Last ALB was 3.6% done on 5/31/2024.   , and   Last A1c value was 6.6% done 5/31/2024.    HPI: Patient is a 69-year-old female who presents for hospital follow-up status post CABG x 3 on 6/11/2024.  Patient was discharged home 6/15/2024. Taking rosuvastatin, metoprolol, amiodarone. Was going to talk about blood thinner. Taking baby aspirin once a day. Physical therapy came this morning for the second time. Home health nurse has come as well. Some issues with constipation. Tried prunes. Some oozing of blood in the groin where the cath was done. She is typically wearing compression stockings. Walking a bit. No dizziness or lightheaded.  She has an upcoming appoint with Dr. Saleem.    Completed Norco.  Asked about using Tylenol for pain.    Having some leg pain behind the left knee. There is some swelling there as well.      History/Other:   Current Medications:  Medication Reconciliation:  I am aware of an inpatient discharge within the last 30 days.  The  discharge medication list has been reconciled with the patient's current medication list and reviewed by me.  See medication list for additions of new medication, and changes to current doses of medications and discontinued medications.  Outpatient Medications Marked as Taking for the 6/21/24 encounter (Office Visit) with Kasey Mendenhall MD   Medication Sig    metoprolol tartrate 50 MG Oral Tab Take 1 tablet (50 mg total) by mouth 2x Daily(Beta Blocker).    amiodarone 200 MG Oral Tab Take 1 tablet (200 mg total) by mouth daily.    polyethylene glycol, PEG 3350, 17 g Oral Powd Pack Take 17 g by mouth daily as needed (Constipation).    docusate sodium 100 MG Oral Cap Take 1 capsule (100 mg total) by mouth 2 (two) times daily as needed for constipation.    metFORMIN HCl ER, OSM, 500 MG (OSM) Oral Tablet 24 Hr Take 1 tablet (500 mg total) by mouth 2 (two) times daily with meals.    aspirin 81 MG Oral Tab EC Take 1 tablet (81 mg total) by mouth at bedtime.    rosuvastatin 10 MG Oral Tab Take 1 tablet (10 mg total) by mouth nightly.       Review of Systems:  GENERAL: weight stable, energy stable, no sweating  SKIN: denies any unusual skin lesions  EYES: denies blurred vision or double vision  HEENT: denies nasal congestion, sinus pain or ST  LUNGS: denies shortness of breath with exertion  CARDIOVASCULAR: denies chest pain on exertion or palpitations  GI: denies abdominal pain, denies heartburn, denies diarrhea  MUSCULOSKELETAL: see HPI  NEURO: denies headaches, denies dizziness, denies weakness  PSYCHE: denies depression or anxiety  HEMATOLOGIC: + bruising  ENDOCRINE: denies thyroid history  ALL/ASTHMA: denies hx of allergy or asthma    Objective:   No LMP recorded. Patient is postmenopausal.  Estimated body mass index is 25.3 kg/m² as calculated from the following:    Height as of this encounter: 5' 8\" (1.727 m).    Weight as of this encounter: 166 lb 6.4 oz (75.5 kg).   /74 (BP Location: Left arm, Patient  Position: Sitting, Cuff Size: adult)   Pulse 84   Temp 97 °F (36.1 °C) (Temporal)   Resp 16   Ht 5' 8\" (1.727 m)   Wt 166 lb 6.4 oz (75.5 kg)   BMI 25.30 kg/m²    GENERAL: well developed, well nourished, in no apparent distress  SKIN: no rashes, no suspicious lesions  HEENT: atraumatic, normocephalic, ears and throat are clear  EYES: PERRLA, EOMI, conjunctiva are clear  NECK: supple, no adenopathy  CHEST: sternotomy incision c/d/i  LUNGS: clear to auscultation  CARDIO: RRR without murmur  GI: good BS's, no masses, HSM or tenderness  MUSCULOSKELETAL: back is not tender, FROM of the extremities  EXTREMITIES: no cyanosis, clubbing; +left posterior thigh superior popliteal fossa there is bruising and firm swelling with tenderness  NEURO: Oriented times three, cranial nerves are intact, motor and sensory are grossly intact  VASCULAR: 2+ posterior tibialis pulses b/l    Assessment & Plan:   1. Hospital discharge follow-up (Primary)  2. Localized swelling of left lower extremity  3. Acute leg pain, left  Patient has noticed increased swelling and pain of the left lower extremity above the knee.  Concern for DVT, will check ultrasound venous Doppler to further evaluate.  Further recommendations pending results.  -     US VENOUS DOPPLER LEG BILAT - DIAG IMG (CPT=93970); Future; Expected date: 06/21/2024  4. S/P CABG x 3  Progressing as expected with the exception of above. Keep upcoming appointment with cardiology.   -     US VENOUS DOPPLER LEG BILAT - DIAG IMG (CPT=93970); Future; Expected date: 06/21/2024  5. Type 2 diabetes mellitus without complication, without long-term current use of insulin (HCC)  Patient restarted metformin  - repeat labs will be due 11/2024  -     Comp Metabolic Panel (14); Future; Expected date: 11/21/2024  -     Lipid Panel; Future; Expected date: 11/21/2024  -     Hemoglobin A1C; Future; Expected date: 11/21/2024  -     Microalb/Creat Ratio, Random Urine; Future; Expected date:  11/21/2024  6. Anemia, unspecified type  Patient has anemia postoperatively due to current constipation recommended increased iron rich foods in the diet. Can re-assess. Repeat CBC in 3 months.   -     CBC With Differential With Platelet; Future; Expected date: 09/21/2024        Return in 3 months (on 9/21/2024) for medicare annual wellness super visit, or sooner if needed.         Kasey Mendenhall MD  [facility]  06/21/24    Please note that portions of this note may have been completed with a voice recognition program. Efforts were made to edit the dictations but occasionally words are mis-transcribed. Thank you for your understanding.    The 21st Century Cures Act makes medical notes like these available to patients in the interest of transparency. Please be advised this is a medical document. Medical documents are intended to carry relevant information, facts as evident, and the clinical opinion of the practitioner. The medical note is intended as peer to peer communication and may appear blunt or direct. It is written in medical language and may contain abbreviations or verbiage that are unfamiliar. If there are any questions or concerns please contact the provider for clarification.

## 2024-06-21 NOTE — TELEPHONE ENCOUNTER
Thank you for the update. Glad there is no blood clot. Continue compression stockings. Elevate legs when resting, stay hydrated, continue to walk and avoid sitting for more than an hour at a time. If sitting for more than an hour can do ankle pumps 15 on each leg. Can use warm compress to the left leg swelling, avoid massaging the leg. Keep appointment with cardiology. Thank you.

## 2024-06-24 ENCOUNTER — HOSPITAL ENCOUNTER (OUTPATIENT)
Dept: GENERAL RADIOLOGY | Facility: HOSPITAL | Age: 69
Discharge: HOME | End: 2024-06-24
Attending: THORACIC SURGERY (CARDIOTHORACIC VASCULAR SURGERY)

## 2024-06-24 DIAGNOSIS — J90 PLEURAL EFFUSION: ICD-10-CM

## 2024-06-24 PROCEDURE — 71048 X-RAY EXAM CHEST 4+ VIEWS: CPT | Performed by: THORACIC SURGERY (CARDIOTHORACIC VASCULAR SURGERY)

## 2024-06-24 NOTE — OPERATIVE REPORT
[facility]    PATIENT'S NAME: KAYY COLEMAN   ATTENDING PHYSICIAN: Jp Maynard MD   OPERATING PHYSICIAN: Jp Maynard MD   PATIENT ACCOUNT#:   [id number]    LOCATION:  [address] [place] [address]  MEDICAL RECORD #:   [id number]       YOB: 1955  ADMISSION DATE:       06/11/2024      OPERATION DATE:  06/11/2024    OPERATIVE REPORT    PREOPERATIVE DIAGNOSIS:  Coronary artery disease.  POSTOPERATIVE DIAGNOSIS:  Coronary artery disease.  PROCEDURE:  Coronary revascularization x3:  Left internal mammary artery graft to the left anterior descending; saphenous vein graft to the obtuse marginal and right coronary artery.    ASSISTANT:  Uday Gomes PA-C.    ANESTHESIA:  General endotracheal.    BLOOD LOSS:  600 mL.    COMPLICATION:  None.    INDICATIONS:  The patient is a super nice 69-year-old female with recently diagnosed severe multivessel coronary artery disease best treated surgically.  Risks, benefits, and options were discussed.      OPERATIVE TECHNIQUE:  Appropriate lines and catheters were placed.  General anesthesia induced.  The patient was prepped and draped.  Sternotomy was performed.  Left internal mammary was taken down.  Greater saphenous graft was harvested from the legs.  The patient was heparinized and cannulated for bypass.  On bypass, patient was cooled to 32 degrees centigrade.  The aorta was crossclamped.  Blood cardioplegia infused to achieve electromechanical arrest of the heart.  The saphenous graft was then placed to the obtuse marginal, which was a 1.5 mm artery.  Cardioplegia was given, following which the saphenous graft was placed to the right coronary artery, also a 1.5 mm artery.  Lastly, the left internal mammary was placed to the LAD, each of which was a 1.5 mm artery.  Rewarming was begun while proximal anastomoses for the 2 vein grafts were constructed end-to-side to the aorta.  Warm cardioplegia was given.  The aorta was unclamped.  Following complete and thorough  rewarming, the patient weaned from bypass without difficulty.  Pump time 94 minutes.  Crossclamp 75 minutes.  Cardioplegia 2.5 L.  Patient was decannulated.  Protamine was administered.  Pacing leads were applied to the heart.  Chest was closed in usual fashion.  Patient was taken to the ICU in stable condition.  The patient's family was updated by me regarding the patient's condition and the conduct of the operation.    Dictated By Jp Maynard MD  d: 06/21/2024 14:16:44  t: 06/21/2024 23:32:13  [facility] [id number]  /

## 2024-07-02 NOTE — PROGRESS NOTES
Attempted to contact pt for TCM however no answer. Call continued to ring and then disconnected. Unable to leave a VM at this time. Will await a returned phone call.       
Initial Post Discharge Follow Up   Discharge Date: 6/15/24  Contact Date: 6/17/2024    Consent Verification:  Assessment Completed With: Patient  HIPAA Verified?  Yes    Discharge Dx:   s/p CABG x3v (lima-lad, svg-om, pda)-6/11/24     General:   How have you been since your discharge from the hospital? S/w pt briefly for TCM. Pt stated she is doing well but that [facility] PT had just arrived therefore a call back another time is best. NCM to contact  pt back at a later time to complete TCM call.     
Multiple attempts to reach pt and messages left with no return call.  Patient went in for HFU appt with PCP on 6/21/24.  Encounter closing.    
Psych/Behavioral

## 2024-08-07 ENCOUNTER — ORDER TRANSCRIPTION (OUTPATIENT)
Dept: CARDIAC REHAB | Facility: HOSPITAL | Age: 69
End: 2024-08-07

## 2024-08-07 DIAGNOSIS — Z95.1 S/P CABG (CORONARY ARTERY BYPASS GRAFT): Primary | ICD-10-CM

## 2024-08-13 ENCOUNTER — CARDPULM VISIT (OUTPATIENT)
Dept: CARDIAC REHAB | Facility: HOSPITAL | Age: 69
End: 2024-08-13
Attending: INTERNAL MEDICINE
Payer: MEDICARE

## 2024-08-19 ENCOUNTER — CARDPULM VISIT (OUTPATIENT)
Dept: CARDIAC REHAB | Facility: HOSPITAL | Age: 69
End: 2024-08-19
Attending: INTERNAL MEDICINE
Payer: MEDICARE

## 2024-08-19 PROCEDURE — 93798 PHYS/QHP OP CAR RHAB W/ECG: CPT

## 2024-08-20 ENCOUNTER — APPOINTMENT (OUTPATIENT)
Dept: CARDIAC REHAB | Facility: HOSPITAL | Age: 69
End: 2024-08-20
Attending: INTERNAL MEDICINE
Payer: MEDICARE

## 2024-08-21 ENCOUNTER — PATIENT OUTREACH (OUTPATIENT)
Dept: FAMILY MEDICINE CLINIC | Facility: CLINIC | Age: 69
End: 2024-08-21

## 2024-08-21 ENCOUNTER — CARDPULM VISIT (OUTPATIENT)
Dept: CARDIAC REHAB | Facility: HOSPITAL | Age: 69
End: 2024-08-21
Attending: INTERNAL MEDICINE
Payer: MEDICARE

## 2024-08-21 ENCOUNTER — TELEPHONE (OUTPATIENT)
Dept: FAMILY MEDICINE CLINIC | Facility: CLINIC | Age: 69
End: 2024-08-21

## 2024-08-21 PROCEDURE — 93798 PHYS/QHP OP CAR RHAB W/ECG: CPT

## 2024-08-22 ENCOUNTER — APPOINTMENT (OUTPATIENT)
Dept: CARDIAC REHAB | Facility: HOSPITAL | Age: 69
End: 2024-08-22
Attending: INTERNAL MEDICINE
Payer: MEDICARE

## 2024-08-23 ENCOUNTER — CARDPULM VISIT (OUTPATIENT)
Dept: CARDIAC REHAB | Facility: HOSPITAL | Age: 69
End: 2024-08-23
Attending: INTERNAL MEDICINE
Payer: MEDICARE

## 2024-08-23 PROCEDURE — 93798 PHYS/QHP OP CAR RHAB W/ECG: CPT

## 2024-08-26 ENCOUNTER — CARDPULM VISIT (OUTPATIENT)
Dept: CARDIAC REHAB | Facility: HOSPITAL | Age: 69
End: 2024-08-26
Attending: INTERNAL MEDICINE
Payer: MEDICARE

## 2024-08-26 PROCEDURE — 93798 PHYS/QHP OP CAR RHAB W/ECG: CPT

## 2024-08-27 ENCOUNTER — APPOINTMENT (OUTPATIENT)
Dept: CARDIAC REHAB | Facility: HOSPITAL | Age: 69
End: 2024-08-27
Attending: INTERNAL MEDICINE
Payer: MEDICARE

## 2024-08-27 ENCOUNTER — PATIENT OUTREACH (OUTPATIENT)
Dept: FAMILY MEDICINE CLINIC | Facility: CLINIC | Age: 69
End: 2024-08-27

## 2024-08-28 ENCOUNTER — CARDPULM VISIT (OUTPATIENT)
Dept: CARDIAC REHAB | Facility: HOSPITAL | Age: 69
End: 2024-08-28
Attending: INTERNAL MEDICINE
Payer: MEDICARE

## 2024-08-28 PROCEDURE — 93798 PHYS/QHP OP CAR RHAB W/ECG: CPT

## 2024-08-29 ENCOUNTER — APPOINTMENT (OUTPATIENT)
Dept: CARDIAC REHAB | Facility: HOSPITAL | Age: 69
End: 2024-08-29
Attending: INTERNAL MEDICINE
Payer: MEDICARE

## 2024-08-30 ENCOUNTER — CARDPULM VISIT (OUTPATIENT)
Dept: CARDIAC REHAB | Facility: HOSPITAL | Age: 69
End: 2024-08-30
Attending: INTERNAL MEDICINE
Payer: MEDICARE

## 2024-08-30 PROCEDURE — 93798 PHYS/QHP OP CAR RHAB W/ECG: CPT

## 2024-09-03 ENCOUNTER — APPOINTMENT (OUTPATIENT)
Dept: CARDIAC REHAB | Facility: HOSPITAL | Age: 69
End: 2024-09-03
Attending: INTERNAL MEDICINE
Payer: MEDICARE

## 2024-09-04 ENCOUNTER — CARDPULM VISIT (OUTPATIENT)
Dept: CARDIAC REHAB | Facility: HOSPITAL | Age: 69
End: 2024-09-04
Attending: INTERNAL MEDICINE

## 2024-09-04 PROCEDURE — 93798 PHYS/QHP OP CAR RHAB W/ECG: CPT

## 2024-09-05 ENCOUNTER — APPOINTMENT (OUTPATIENT)
Dept: CARDIAC REHAB | Facility: HOSPITAL | Age: 69
End: 2024-09-05
Attending: INTERNAL MEDICINE
Payer: MEDICARE

## 2024-09-06 ENCOUNTER — CARDPULM VISIT (OUTPATIENT)
Dept: CARDIAC REHAB | Facility: HOSPITAL | Age: 69
End: 2024-09-06
Attending: INTERNAL MEDICINE

## 2024-09-06 PROCEDURE — 93798 PHYS/QHP OP CAR RHAB W/ECG: CPT

## 2024-09-09 ENCOUNTER — CARDPULM VISIT (OUTPATIENT)
Dept: CARDIAC REHAB | Facility: HOSPITAL | Age: 69
End: 2024-09-09
Attending: INTERNAL MEDICINE

## 2024-09-09 PROCEDURE — 93798 PHYS/QHP OP CAR RHAB W/ECG: CPT

## 2024-09-11 ENCOUNTER — LAB ENCOUNTER (OUTPATIENT)
Dept: LAB | Age: 69
End: 2024-09-11
Attending: INTERNAL MEDICINE
Payer: MEDICARE

## 2024-09-11 ENCOUNTER — CARDPULM VISIT (OUTPATIENT)
Dept: CARDIAC REHAB | Facility: HOSPITAL | Age: 69
End: 2024-09-11
Attending: INTERNAL MEDICINE

## 2024-09-11 DIAGNOSIS — I10 HYPERTENSION, UNSPECIFIED TYPE: ICD-10-CM

## 2024-09-11 DIAGNOSIS — D64.9 ANEMIA, UNSPECIFIED TYPE: ICD-10-CM

## 2024-09-11 DIAGNOSIS — R79.89 ELEVATED LFTS: ICD-10-CM

## 2024-09-11 DIAGNOSIS — I70.90 ATHEROSCLEROSIS OF ARTERIES: ICD-10-CM

## 2024-09-11 DIAGNOSIS — E78.00 HYPERCHOLESTEROLEMIA: ICD-10-CM

## 2024-09-11 DIAGNOSIS — R77.1 ELEVATED SERUM GLOBULIN LEVEL: ICD-10-CM

## 2024-09-11 DIAGNOSIS — R93.1 ELEVATED CORONARY ARTERY CALCIUM SCORE: ICD-10-CM

## 2024-09-11 DIAGNOSIS — E11.9 TYPE 2 DIABETES MELLITUS WITHOUT COMPLICATION, WITHOUT LONG-TERM CURRENT USE OF INSULIN (HCC): ICD-10-CM

## 2024-09-11 LAB
ALBUMIN SERPL-MCNC: 4.5 G/DL (ref 3.2–4.8)
ALBUMIN/GLOB SERPL: 1.6 {RATIO} (ref 1–2)
ALP LIVER SERPL-CCNC: 75 U/L
ALT SERPL-CCNC: 13 U/L
ANION GAP SERPL CALC-SCNC: 9 MMOL/L (ref 0–18)
AST SERPL-CCNC: 18 U/L (ref ?–34)
BASOPHILS # BLD AUTO: 0.09 X10(3) UL (ref 0–0.2)
BASOPHILS NFR BLD AUTO: 1.5 %
BILIRUB SERPL-MCNC: 0.4 MG/DL (ref 0.2–1.1)
BUN BLD-MCNC: 16 MG/DL (ref 9–23)
CALCIUM BLD-MCNC: 9.9 MG/DL (ref 8.7–10.4)
CHLORIDE SERPL-SCNC: 109 MMOL/L (ref 98–112)
CHOLEST SERPL-MCNC: 124 MG/DL (ref ?–200)
CO2 SERPL-SCNC: 22 MMOL/L (ref 21–32)
CREAT BLD-MCNC: 0.81 MG/DL
EGFRCR SERPLBLD CKD-EPI 2021: 79 ML/MIN/1.73M2 (ref 60–?)
EOSINOPHIL # BLD AUTO: 0.27 X10(3) UL (ref 0–0.7)
EOSINOPHIL NFR BLD AUTO: 4.6 %
ERYTHROCYTE [DISTWIDTH] IN BLOOD BY AUTOMATED COUNT: 19.1 %
EST. AVERAGE GLUCOSE BLD GHB EST-MCNC: 131 MG/DL (ref 68–126)
FASTING PATIENT LIPID ANSWER: YES
FASTING STATUS PATIENT QL REPORTED: YES
GLOBULIN PLAS-MCNC: 2.8 G/DL (ref 2–3.5)
GLUCOSE BLD-MCNC: 111 MG/DL (ref 70–99)
HBA1C MFR BLD: 6.2 % (ref ?–5.7)
HCT VFR BLD AUTO: 28.4 %
HDLC SERPL-MCNC: 46 MG/DL (ref 40–59)
HGB BLD-MCNC: 8 G/DL
IMM GRANULOCYTES # BLD AUTO: 0.01 X10(3) UL (ref 0–1)
IMM GRANULOCYTES NFR BLD: 0.2 %
LDLC SERPL CALC-MCNC: 61 MG/DL (ref ?–100)
LYMPHOCYTES # BLD AUTO: 1.8 X10(3) UL (ref 1–4)
LYMPHOCYTES NFR BLD AUTO: 30.5 %
MCH RBC QN AUTO: 20.1 PG (ref 26–34)
MCHC RBC AUTO-ENTMCNC: 28.2 G/DL (ref 31–37)
MCV RBC AUTO: 71.2 FL
MONOCYTES # BLD AUTO: 0.65 X10(3) UL (ref 0.1–1)
MONOCYTES NFR BLD AUTO: 11 %
NEUTROPHILS # BLD AUTO: 3.09 X10 (3) UL (ref 1.5–7.7)
NEUTROPHILS # BLD AUTO: 3.09 X10(3) UL (ref 1.5–7.7)
NEUTROPHILS NFR BLD AUTO: 52.2 %
NONHDLC SERPL-MCNC: 78 MG/DL (ref ?–130)
OSMOLALITY SERPL CALC.SUM OF ELEC: 292 MOSM/KG (ref 275–295)
PLATELET # BLD AUTO: 430 10(3)UL (ref 150–450)
POTASSIUM SERPL-SCNC: 4.6 MMOL/L (ref 3.5–5.1)
PROT SERPL-MCNC: 7.3 G/DL (ref 5.7–8.2)
RBC # BLD AUTO: 3.99 X10(6)UL
SODIUM SERPL-SCNC: 140 MMOL/L (ref 136–145)
TRIGL SERPL-MCNC: 86 MG/DL (ref 30–149)
VLDLC SERPL CALC-MCNC: 13 MG/DL (ref 0–30)
WBC # BLD AUTO: 5.9 X10(3) UL (ref 4–11)

## 2024-09-11 PROCEDURE — 93798 PHYS/QHP OP CAR RHAB W/ECG: CPT

## 2024-09-11 PROCEDURE — 80061 LIPID PANEL: CPT

## 2024-09-11 PROCEDURE — 85025 COMPLETE CBC W/AUTO DIFF WBC: CPT

## 2024-09-11 PROCEDURE — 83036 HEMOGLOBIN GLYCOSYLATED A1C: CPT

## 2024-09-11 PROCEDURE — 80053 COMPREHEN METABOLIC PANEL: CPT

## 2024-09-12 ENCOUNTER — OFFICE VISIT (OUTPATIENT)
Dept: FAMILY MEDICINE CLINIC | Facility: CLINIC | Age: 69
End: 2024-09-12
Payer: COMMERCIAL

## 2024-09-12 ENCOUNTER — LAB ENCOUNTER (OUTPATIENT)
Dept: LAB | Age: 69
End: 2024-09-12
Attending: NURSE PRACTITIONER
Payer: MEDICARE

## 2024-09-12 VITALS
SYSTOLIC BLOOD PRESSURE: 124 MMHG | DIASTOLIC BLOOD PRESSURE: 70 MMHG | RESPIRATION RATE: 16 BRPM | BODY MASS INDEX: 24.13 KG/M2 | HEART RATE: 76 BPM | TEMPERATURE: 98 F | HEIGHT: 68 IN | WEIGHT: 159.19 LBS

## 2024-09-12 DIAGNOSIS — R77.1 ELEVATED SERUM GLOBULIN LEVEL: ICD-10-CM

## 2024-09-12 DIAGNOSIS — D64.9 ANEMIA, UNSPECIFIED TYPE: ICD-10-CM

## 2024-09-12 DIAGNOSIS — R79.89 ELEVATED LFTS: ICD-10-CM

## 2024-09-12 DIAGNOSIS — E78.00 HYPERCHOLESTEROLEMIA: ICD-10-CM

## 2024-09-12 DIAGNOSIS — Z95.1 S/P CABG X 3: ICD-10-CM

## 2024-09-12 DIAGNOSIS — I10 HYPERTENSION, UNSPECIFIED TYPE: ICD-10-CM

## 2024-09-12 DIAGNOSIS — K59.00 CONSTIPATION, UNSPECIFIED CONSTIPATION TYPE: ICD-10-CM

## 2024-09-12 DIAGNOSIS — I25.110 CORONARY ARTERY DISEASE INVOLVING NATIVE CORONARY ARTERY OF NATIVE HEART WITH UNSTABLE ANGINA PECTORIS (HCC): ICD-10-CM

## 2024-09-12 DIAGNOSIS — E11.9 TYPE 2 DIABETES MELLITUS WITHOUT COMPLICATION, WITHOUT LONG-TERM CURRENT USE OF INSULIN (HCC): Primary | ICD-10-CM

## 2024-09-12 DIAGNOSIS — E11.9 TYPE 2 DIABETES MELLITUS WITHOUT COMPLICATION, WITHOUT LONG-TERM CURRENT USE OF INSULIN (HCC): ICD-10-CM

## 2024-09-12 DIAGNOSIS — K92.1 BLOOD IN STOOL: ICD-10-CM

## 2024-09-12 DIAGNOSIS — R93.1 ELEVATED CORONARY ARTERY CALCIUM SCORE: ICD-10-CM

## 2024-09-12 DIAGNOSIS — I70.90 ATHEROSCLEROSIS OF ARTERIES: ICD-10-CM

## 2024-09-12 LAB
CREAT UR-SCNC: 304.6 MG/DL
DEPRECATED HBV CORE AB SER IA-ACNC: 3 NG/ML
IRON SATN MFR SERPL: 3 %
IRON SERPL-MCNC: 16 UG/DL
MICROALBUMIN UR-MCNC: 6.5 MG/DL
MICROALBUMIN/CREAT 24H UR-RTO: 21.3 UG/MG (ref ?–30)
TOTAL IRON BINDING CAPACITY: 532 UG/DL (ref 250–425)
TRANSFERRIN SERPL-MCNC: 435 MG/DL (ref 250–380)

## 2024-09-12 PROCEDURE — 1159F MED LIST DOCD IN RCRD: CPT | Performed by: NURSE PRACTITIONER

## 2024-09-12 PROCEDURE — 99214 OFFICE O/P EST MOD 30 MIN: CPT | Performed by: NURSE PRACTITIONER

## 2024-09-12 PROCEDURE — 82728 ASSAY OF FERRITIN: CPT

## 2024-09-12 PROCEDURE — G2211 COMPLEX E/M VISIT ADD ON: HCPCS | Performed by: NURSE PRACTITIONER

## 2024-09-12 PROCEDURE — 1160F RVW MEDS BY RX/DR IN RCRD: CPT | Performed by: NURSE PRACTITIONER

## 2024-09-12 PROCEDURE — 83540 ASSAY OF IRON: CPT

## 2024-09-12 PROCEDURE — 83550 IRON BINDING TEST: CPT

## 2024-09-12 PROCEDURE — 3078F DIAST BP <80 MM HG: CPT | Performed by: NURSE PRACTITIONER

## 2024-09-12 PROCEDURE — 82043 UR ALBUMIN QUANTITATIVE: CPT

## 2024-09-12 PROCEDURE — 3074F SYST BP LT 130 MM HG: CPT | Performed by: NURSE PRACTITIONER

## 2024-09-12 PROCEDURE — 82570 ASSAY OF URINE CREATININE: CPT

## 2024-09-12 PROCEDURE — 3044F HG A1C LEVEL LT 7.0%: CPT | Performed by: NURSE PRACTITIONER

## 2024-09-12 PROCEDURE — 3008F BODY MASS INDEX DOCD: CPT | Performed by: NURSE PRACTITIONER

## 2024-09-12 RX ORDER — METFORMIN HCL 500 MG
500 TABLET, EXTENDED RELEASE 24 HR ORAL 2 TIMES DAILY WITH MEALS
Qty: 180 TABLET | Refills: 1 | Status: SHIPPED | OUTPATIENT
Start: 2024-09-12

## 2024-09-12 NOTE — PROGRESS NOTES
Ilda Gutierrez is a 69 year old female.  HPI:   Follow up on labs. A1c at 6.2%. noted to be anemic. Patient reports no hx of anemia.    She is doing cardiac rehab 3 times/wk.  She is seeing Dr. Saleem- last appt was 1 week ago, will see again in 6 months. Denies any chest pain or shortness of breath.  Reports she went through menopause in her mid 40's. She denies any vaginal bleeding or spotting.   She has never had a colonoscopy. She does note some constipation- will take prune juice. Has had 2 episodes of blood in stool and when wiping, the last episode was about 2 weeks ago.   She is due for mammogram- order in system to complete. Last mammogram- 2021.   Patient reports last pap smear was 40 years ago after her last child- this was the last time she saw gynecology.   Due for MA visit- pt to schedule.  Pt traveling to [place] in October--looking forward to this.    Wt Readings from Last 6 Encounters:   09/12/24 159 lb 3.2 oz (72.2 kg)   06/21/24 166 lb 6.4 oz (75.5 kg)   06/15/24 172 lb 6.4 oz (78.2 kg)   06/04/24 171 lb (77.6 kg)   05/22/24 173 lb (78.5 kg)   12/06/18 180 lb (81.6 kg)     Current Outpatient Medications   Medication Sig Dispense Refill    metFORMIN  MG Oral Tablet 24 Hr Take 1 tablet (500 mg total) by mouth 2 (two) times daily with meals. 180 tablet 1    metoprolol tartrate 50 MG Oral Tab Take 1 tablet (50 mg total) by mouth 2x Daily(Beta Blocker). 60 tablet 3    aspirin 81 MG Oral Tab EC Take 1 tablet (81 mg total) by mouth at bedtime.      rosuvastatin 10 MG Oral Tab Take 1 tablet (10 mg total) by mouth nightly.        Past Medical History:    Coronary atherosclerosis    Diabetes (HCC)    High blood pressure    High cholesterol    Hyperlipidemia      Social History:  Social History     Socioeconomic History    Marital status:    Tobacco Use    Smoking status: Never    Smokeless tobacco: Never   Vaping Use    Vaping status: Never Used   Substance and Sexual Activity    Alcohol use:  Yes     Comment: OCCASIONAL    Drug use: Never     Social Determinants of Health     Food Insecurity: No Food Insecurity (6/11/2024)    Food Insecurity     Food Insecurity: Never true   Transportation Needs: No Transportation Needs (6/11/2024)    Transportation Needs     Lack of Transportation: No   Housing Stability: Low Risk  (6/11/2024)    Housing Stability     Housing Instability: No        REVIEW OF SYSTEMS:   GENERAL HEALTH: feels well otherwise  RESPIRATORY: denies shortness of breath with exertion  CARDIOVASCULAR: denies chest pain on exertion  GI: denies abdominal pain, reports episode x 2 of blood in stool and constipation  NEURO: denies headaches  Musculoskeletal: No motor deficits  EXAM:   /70   Pulse 76   Temp 97.7 °F (36.5 °C) (Temporal)   Resp 16   Ht 5' 8\" (1.727 m)   Wt 159 lb 3.2 oz (72.2 kg)   BMI 24.21 kg/m²   GENERAL: well developed, well nourished,in no apparent distress  HEENT: TM clear bilaterally, throat clear no erythema without mass.   EYES: sclera clear without injection  NECK: supple,no adenopathy  LUNGS: clear to auscultation no rales, rhonchi or wheezes  CARDIO: RRR without murmur  GI: Normal bowel sounds ×4 quadrant, no hepatosplenomegaly or masses, and no tenderness   EXTREMITIES: no cyanosis, clubbing or edema  Musculoskeletal: No gross deficit  Neurological: nerves II through XII grossly intact no sensorimotor deficit  Psychological: Mood and affect are normal. Good communication skills.  ASSESSMENT AND PLAN:     Encounter Diagnoses   Name Primary?    Type 2 diabetes mellitus without complication, without long-term current use of insulin (HCC) Yes    Anemia, unspecified type     Coronary artery disease involving native coronary artery of native heart with unstable angina pectoris (HCC)     Hypercholesterolemia     Elevated coronary artery calcium score     S/P CABG x 3     Constipation, unspecified constipation type     Blood in stool        1. Type 2 diabetes mellitus  without complication, without long-term current use of insulin (HCC)  - metFORMIN  MG Oral Tablet 24 Hr; Take 1 tablet (500 mg total) by mouth 2 (two) times daily with meals.  Dispense: 180 tablet; Refill: 1    2. Anemia, unspecified type  - Oncology/Hematology Referral - In Network  - Gastro Referral - In Network  - Occult Blood, Fecal, Immunoassay (Blue cards) [E]; Future    3. Coronary artery disease involving native coronary artery of native heart with unstable angina pectoris (HCC)    4. Hypercholesterolemia    5. Elevated coronary artery calcium score    6. S/P CABG x 3    7. Constipation, unspecified constipation type    8. Blood in stool  - Occult Blood, Fecal, Immunoassay (Blue cards) [E]; Future       Orders Placed This Encounter   Procedures    Occult Blood, Fecal, Immunoassay (Blue cards) [E]       Meds & Refills for this Visit:  Requested Prescriptions     Signed Prescriptions Disp Refills    metFORMIN  MG Oral Tablet 24 Hr 180 tablet 1     Sig: Take 1 tablet (500 mg total) by mouth 2 (two) times daily with meals.       Imaging & Consults:  OP REFERRAL TO [facility] HEMATOLOGY[facility]ONCOLOGY GROUP  GASTRO - INTERNAL    Follow Up with:  No follow-up provider specified.    Reviewed recent labs with the patient. Discussed anemia with the patient. Will check IRON/TIBC/Ferritin today.  Discussed with patient need for up to date cancer screenings. She has not had a pap/pelvic x 40 years--pt to schedule MA visit with pap/pelvic.  Schedule mammogram- order in system.  Needs eval with hematology- referral placed.  GI referral placed- evaluation of anemia, constipation/blood in stool episode x 2 but also need for colonoscopy.  Continue medications as ordered.  Continue follow up with cardiac rehab and Dr. Saleem.    The patient indicates understanding of these issues and agrees to the plan.  The patient is asked to return in 1 month for MA visit- pap/pelvic.

## 2024-09-13 ENCOUNTER — CARDPULM VISIT (OUTPATIENT)
Dept: CARDIAC REHAB | Facility: HOSPITAL | Age: 69
End: 2024-09-13
Attending: INTERNAL MEDICINE

## 2024-09-13 PROCEDURE — 93798 PHYS/QHP OP CAR RHAB W/ECG: CPT

## 2024-09-16 ENCOUNTER — CARDPULM VISIT (OUTPATIENT)
Dept: CARDIAC REHAB | Facility: HOSPITAL | Age: 69
End: 2024-09-16
Attending: INTERNAL MEDICINE

## 2024-09-16 ENCOUNTER — TELEPHONE (OUTPATIENT)
Dept: FAMILY MEDICINE CLINIC | Facility: CLINIC | Age: 69
End: 2024-09-16

## 2024-09-16 DIAGNOSIS — D64.9 ANEMIA, UNSPECIFIED TYPE: Primary | ICD-10-CM

## 2024-09-16 PROCEDURE — 93798 PHYS/QHP OP CAR RHAB W/ECG: CPT

## 2024-09-18 ENCOUNTER — CARDPULM VISIT (OUTPATIENT)
Dept: CARDIAC REHAB | Facility: HOSPITAL | Age: 69
End: 2024-09-18
Attending: INTERNAL MEDICINE

## 2024-09-18 PROCEDURE — 93798 PHYS/QHP OP CAR RHAB W/ECG: CPT

## 2024-09-20 ENCOUNTER — CARDPULM VISIT (OUTPATIENT)
Dept: CARDIAC REHAB | Facility: HOSPITAL | Age: 69
End: 2024-09-20
Attending: INTERNAL MEDICINE

## 2024-09-20 PROCEDURE — 93798 PHYS/QHP OP CAR RHAB W/ECG: CPT

## 2024-09-21 ENCOUNTER — PATIENT OUTREACH (OUTPATIENT)
Dept: FAMILY MEDICINE CLINIC | Facility: CLINIC | Age: 69
End: 2024-09-21

## 2024-09-21 NOTE — PROGRESS NOTES
Patient is due for mammogram/breast cancer screening. A mammogram order has been placed, please call/remind pt to schedule/complete and the importance of breast cancer screening.

## 2024-09-23 ENCOUNTER — CARDPULM VISIT (OUTPATIENT)
Dept: CARDIAC REHAB | Facility: HOSPITAL | Age: 69
End: 2024-09-23
Attending: INTERNAL MEDICINE

## 2024-09-23 PROCEDURE — 93798 PHYS/QHP OP CAR RHAB W/ECG: CPT

## 2024-09-25 ENCOUNTER — CARDPULM VISIT (OUTPATIENT)
Dept: CARDIAC REHAB | Facility: HOSPITAL | Age: 69
End: 2024-09-25
Attending: INTERNAL MEDICINE

## 2024-09-25 PROCEDURE — 93798 PHYS/QHP OP CAR RHAB W/ECG: CPT

## 2024-09-26 ENCOUNTER — TELEPHONE (OUTPATIENT)
Facility: CLINIC | Age: 69
End: 2024-09-26

## 2024-09-26 ENCOUNTER — OFFICE VISIT (OUTPATIENT)
Facility: CLINIC | Age: 69
End: 2024-09-26
Payer: COMMERCIAL

## 2024-09-26 VITALS
HEIGHT: 68 IN | DIASTOLIC BLOOD PRESSURE: 61 MMHG | HEART RATE: 82 BPM | SYSTOLIC BLOOD PRESSURE: 125 MMHG | WEIGHT: 161.13 LBS | BODY MASS INDEX: 24.42 KG/M2

## 2024-09-26 DIAGNOSIS — Z12.11 COLON CANCER SCREENING: Primary | ICD-10-CM

## 2024-09-26 DIAGNOSIS — K62.5 RECTAL BLEEDING: ICD-10-CM

## 2024-09-26 DIAGNOSIS — K62.5 BRBPR (BRIGHT RED BLOOD PER RECTUM): ICD-10-CM

## 2024-09-26 DIAGNOSIS — D50.9 IRON DEFICIENCY ANEMIA, UNSPECIFIED IRON DEFICIENCY ANEMIA TYPE: ICD-10-CM

## 2024-09-26 PROCEDURE — 99204 OFFICE O/P NEW MOD 45 MIN: CPT

## 2024-09-26 PROCEDURE — 1126F AMNT PAIN NOTED NONE PRSNT: CPT

## 2024-09-26 PROCEDURE — 3008F BODY MASS INDEX DOCD: CPT

## 2024-09-26 PROCEDURE — 3074F SYST BP LT 130 MM HG: CPT

## 2024-09-26 PROCEDURE — 1159F MED LIST DOCD IN RCRD: CPT

## 2024-09-26 PROCEDURE — 3078F DIAST BP <80 MM HG: CPT

## 2024-09-26 PROCEDURE — 1160F RVW MEDS BY RX/DR IN RCRD: CPT

## 2024-09-26 NOTE — PATIENT INSTRUCTIONS
1. Schedule colonoscopy with .URGENT pool endoscopist  Diagnosis: BRBPR, CRC screening  Sedation: MAC  Prep: split dose suprep    2. MEDICATION CHANGES PRIOR TO PROCEDURE    *HOLD metformin day before & day of procedure    GI RNs please reach out to cardiology Dr Saleem, for cardiac clearance. To the patient: you are RESPONSIBLE for contacting your cardiologist to be sure blood thinner modifications are approved and no further cardiac testing is needed for cardiac clearance. Failure to obtain clearance can result in procedure cancellations.     3.  bowel prep from pharmacy   You can pick the bowel prep up now and store in a cool, dry place in your home until your scheduled bowel prep start date.    4. Read all bowel prep instructions carefully. Bowel prep instructions can also be found online at:  [email or web address].org/giprep     5. AVOID seeds, nuts, popcorn, raw fruits and vegetables for 5 days before procedure    6. If you start any NEW medication after your visit today, please notify us. Certain medications (like iron or weight loss medications) will need to be held before the procedure, or the procedure cannot be performed safely.

## 2024-09-26 NOTE — H&P
[facility] - Gastroenterology                                                                                                               Reason for consult: BRBPR    Requesting physician or provider: Kasey Mendenhall MD    Chief Complaint   Patient presents with    Rectal Bleeding     Anemia        HPI:   Ilda Gutierrez is a 69 year old year-old female with active diagnoses including coronary artery disease, type 2 diabetes, hypercholesterolemia. Prior medical/surgical history CABGx3 on 6/11/24, other hx in note table.    she is here today for evaluation  #BRBPR  -reports intermittent BRBPR since January, has occurred about 2x per month. Notices small drops of blood in toilet bowl after bowel movement. Denies anal pain, rectal pain, irritation.  Bowel movement are daily to every other day of formed, soft brown stool. She did have constipation after CABG procedure which improved by drinking warm prune juice  -reports appetite was lower after cardiac surgery felt like food tasted different, but states this is improving and appetite is coming back, gained 2 lbs.     Patient denies symptoms of nausea, vomiting, dyspepsia, dysphagia, odynophagia, globus sensation, heartburn, hematemesis, abdominal pain, diarrhea, or melena. she denies recent fever    Last colonoscopy: none   Last EGD: none     NSAIDS: none   Tobacco: none   Alcohol: occasional   Marijuana: none   Illicit drugs: none     FH GI malignancy: none   FH IBD: none     No history of adverse reaction to sedation  No DUARTE  No anticoagulants/antiplatelet  No pacemaker/defibrillator    Wt Readings from Last 6 Encounters:   09/26/24 161 lb 1.6 oz (73.1 kg)   09/12/24 159 lb 3.2 oz (72.2 kg)   06/21/24 166 lb 6.4 oz (75.5 kg)   06/15/24 172 lb 6.4 oz (78.2 kg)   06/04/24 171 lb (77.6 kg)   05/22/24 173 lb (78.5 kg)        History, Medications, Allergies, ROS:       Past Medical History:    Coronary atherosclerosis    Diabetes (HCC)    High blood pressure    High cholesterol    Hyperlipidemia      Past Surgical History:   Procedure Laterality Date    Cabg  2024    [facility] , Triple bypass    Spine surgery procedure unlisted        Family Hx:   Family History   Problem Relation Age of Onset    Breast Cancer Mother 70      Social History:   Social History     Socioeconomic History    Marital status:    Tobacco Use    Smoking status: Never    Smokeless tobacco: Never   Vaping Use    Vaping status: Never Used   Substance and Sexual Activity    Alcohol use: Yes     Comment: OCCASIONAL    Drug use: Never     Social Determinants of Health     Food Insecurity: No Food Insecurity (6/11/2024)    Food Insecurity     Food Insecurity: Never true   Transportation Needs: No Transportation Needs (6/11/2024)    Transportation Needs     Lack of Transportation: No   Housing Stability: Low Risk  (6/11/2024)    Housing Stability     Housing Instability: No        Medications (Active prior to today's visit):  Current Outpatient Medications   Medication Sig Dispense Refill    metFORMIN  MG Oral Tablet 24 Hr Take 1 tablet (500 mg total) by mouth 2 (two) times daily with meals. 180 tablet 1    metoprolol tartrate 50 MG Oral Tab Take 1 tablet (50 mg total) by mouth 2x Daily(Beta Blocker). 60 tablet 3    aspirin 81 MG Oral Tab EC Take 1 tablet (81 mg total) by mouth at bedtime.      rosuvastatin 10 MG Oral Tab Take 1 tablet (10 mg total) by mouth nightly.         Allergies:  No Known Allergies    ROS:   CONSTITUTIONAL: negative for fevers, chills, sweats  EYES Negative for scleral icterus or redness, and diplopia  HEENT: Negative for hoarseness  RESPIRATORY: Negative for cough and severe shortness of breath  CARDIOVASCULAR: Negative for crushing sub-sternal chest pain  GASTROINTESTINAL: See HPI  GENITOURINARY: Negative for dysuria  MUSCULOSKELETAL: Negative for arthralgias and  myalgias  SKIN: Negative for jaundice, rash or pruritus  NEUROLOGICAL: Negative for dizziness and headaches  BEHAVIOR/PSYCH: Negative for psychotic behavior    PHYSICAL EXAM:   Blood pressure 125/61, pulse 82, height 5' 8\" (1.727 m), weight 161 lb 1.6 oz (73.1 kg).    GEN: Alert, no acute distress, well-nourished   HEENT: anicteric sclera, neck supple, trachea midline, MMM, no palpable or tender neck or supraclavicular lymph nodes  CV: RRR, the extremities are warm and well perfused   LUNGS: No increased work of breathing, CTAB  ABDOMEN: Soft, symmetrical, non-tender without distention or guarding. No scars or lesions. Aorta is without bruit or visible pulsation. Umbilicus is midline without herniation. Normoactive bowel sounds are present, No masses, hepatomegaly or splenomegaly noted.  MSK: No erythema, no warmth, no swelling of joints  SKIN: No jaundice, no erythema, no rashes, no lesions  HEMATOLOGIC: No bleeding, no bruising  NEURO: Alert and interactive, BARONE  PSYCH: appropriate mood & affect    Labs/Imaging/Procedures:     Patient's pertinent labs and imaging were reviewed and discussed with patient today.        .  ASSESSMENT/PLAN:   Ilda Gutierrez is a 69 year old year-old female with active diagnoses including coronary artery disease, type 2 diabetes, hypercholesterolemia. Prior medical/surgical history CABGx3 on 6/11/24, other hx in note table.    she is here today for evaluation  #BRBPR  #iron deficiency anemia  -reports intermittent BRBPR since January, has occurred about 2x per month. Notices small drops of blood in toilet bowl after bowel movement. Denies anal pain, rectal pain, irritation.  Bowel movement are daily to every other day of formed, soft brown stool. She did have constipation after CABG procedure which improved by drinking warm prune juice  -reports appetite was lower after cardiac surgery felt like food tasted different, but states this is improving and appetite is coming back, gained 2  lbs.   -per chart review has iron deficiency anemia. Ferritin ~3 since May. Pt has hematology appt on 9/30  -pt has never had a colonoscopy and will need colonoscopy to evaluate above and for CRC screening.     -possibly non-painful hemorrhoids, however bleeding polyp or malignancy cannot be excluded. Advised patient to follow up sooner if symptoms worsen/change. Likely colonoscopy in January 2025 which would be >6 months from CABG procedure. Will request cardiac clearance.     Recommendations:  1. Schedule colonoscopy with .URGENT pool endoscopist  Diagnosis: BRBPR, CRC screening  Sedation: MAC  Prep: split dose suprep    2. MEDICATION CHANGES PRIOR TO PROCEDURE    *HOLD metformin day before & day of procedure    GI RNs please reach out to cardiology Dr Saleem, for cardiac clearance. To the patient: you are RESPONSIBLE for contacting your cardiologist to be sure blood thinner modifications are approved and no further cardiac testing is needed for cardiac clearance. Failure to obtain clearance can result in procedure cancellations.     Endoscopy risk/benefit discussion: I have thoroughly discussed the risks, benefits, and alternatives of endoscopic evaluation with the patient, who demonstrated understanding. This includes the potential risks of bleeding, infection, pain, anesthesia complications, and perforation, which may result in prolonged hospitalization or surgical intervention. All of the patient’s questions were addressed to their satisfaction. The patient has chosen to proceed with the endoscopic procedure, including any necessary interventions such as polypectomy, biopsy, control of bleeding.      Orders This Visit:  No orders of the defined types were placed in this encounter.      Meds This Visit:  Requested Prescriptions      No prescriptions requested or ordered in this encounter       Imaging & Referrals:  None      KAI Garcia    [facility] Gastroenterology  9/26/2024        This note was  partially prepared using Dragon Medical voice recognition dictation software. As a result, errors may occur. When identified, these errors have been corrected. While every attempt is made to correct errors during dictation, discrepancies may still exist.

## 2024-09-26 NOTE — TELEPHONE ENCOUNTER
Scheduled for:  Colonoscopy [id number]  Provider Name:  Dr. Anderson  Date:  1/22/24  Location:[facility]  Sedation:  MAC  Time: (pt is aware that  endo will call the day before to confirm arrival time)  Prep:  Suprep  Meds/Allergies Reconciled?:  KAI Garcia Reviewed  Diagnosis with codes:Colon cancer screening Z12.11, BRBPR K62.5 , iron deficiency of anemia D50.9    Was patient informed to call insurance with codes (Y/N):  Yes  Referral sent?:  Referral was sent at the time of electronic surgical scheduling.  EM or [facility] notified?:  I sent an electronic request to Endo Scheduling and received a confirmation today.  Medication Orders:  Pt is aware to NOT take iron pills, herbal meds and diet supplements for 7 days before exam. Also to NOT take any form of alcohol, recreational drugs and any forms of ED meds 24 hours before exam.    MEDICATION CHANGES PRIOR TO PROCEDURE   *HOLD metformin day before & day of procedure  GI RNs please reach out to cardiology Dr Saleem, for cardiac clearance. To the patient: you are RESPONSIBLE for contacting your cardiologist to be sure blood thinner modifications are approved and no further cardiac testing is needed for cardiac clearance. Failure to obtain clearance can result in procedure cancellations.     Misc Orders: Patient was informed about the new cancellation policy for his/her procedure. Patient was also given a copy of the cancellation policy at the time of the appointment and verbalized understanding.      Further instructions given by staff:  I provide prep instructions to patient at the time of the appointment and reviewed date and location, patient verbalized that she  understood and is aware to call if she has any questions.

## 2024-09-27 ENCOUNTER — CARDPULM VISIT (OUTPATIENT)
Dept: CARDIAC REHAB | Facility: HOSPITAL | Age: 69
End: 2024-09-27
Attending: INTERNAL MEDICINE

## 2024-09-27 PROCEDURE — 93798 PHYS/QHP OP CAR RHAB W/ECG: CPT

## 2024-09-30 ENCOUNTER — CARDPULM VISIT (OUTPATIENT)
Dept: CARDIAC REHAB | Facility: HOSPITAL | Age: 69
End: 2024-09-30
Attending: INTERNAL MEDICINE

## 2024-09-30 ENCOUNTER — TELEPHONE (OUTPATIENT)
Dept: HEMATOLOGY/ONCOLOGY | Facility: HOSPITAL | Age: 69
End: 2024-09-30

## 2024-09-30 ENCOUNTER — OFFICE VISIT (OUTPATIENT)
Dept: HEMATOLOGY/ONCOLOGY | Facility: HOSPITAL | Age: 69
End: 2024-09-30
Attending: STUDENT IN AN ORGANIZED HEALTH CARE EDUCATION/TRAINING PROGRAM
Payer: MEDICARE

## 2024-09-30 VITALS
OXYGEN SATURATION: 97 % | HEART RATE: 74 BPM | TEMPERATURE: 98 F | BODY MASS INDEX: 25 KG/M2 | SYSTOLIC BLOOD PRESSURE: 137 MMHG | RESPIRATION RATE: 16 BRPM | DIASTOLIC BLOOD PRESSURE: 75 MMHG | WEIGHT: 162.19 LBS

## 2024-09-30 DIAGNOSIS — D50.0 IRON DEFICIENCY ANEMIA DUE TO CHRONIC BLOOD LOSS: Primary | ICD-10-CM

## 2024-09-30 PROBLEM — D50.9 IRON DEFICIENCY ANEMIA: Status: ACTIVE | Noted: 2024-09-30

## 2024-09-30 PROCEDURE — 99205 OFFICE O/P NEW HI 60 MIN: CPT | Performed by: STUDENT IN AN ORGANIZED HEALTH CARE EDUCATION/TRAINING PROGRAM

## 2024-09-30 PROCEDURE — 93798 PHYS/QHP OP CAR RHAB W/ECG: CPT

## 2024-09-30 NOTE — PROGRESS NOTES
Hematology/Oncology Initial Consultation Note    Patient Name: Ilda Gutierrez  Medical Record Number: [id number]    YOB: 1955   Date of Consultation: 9/30/2024   PCP: Kasey Mendenhall MD   Other providers:      Reason for Consultation:  Ilda Gutierrez was seen today for the diagnosis of Iron deficiency       ===================================================  History of Present Illness:     69-year-old female with history of coronary artery disease status post recent coronary artery bypass graft presents to the clinic to establish care for iron deficiency anemia.  Patient mentions that she has been noticing blood in her stool, occasionally once or twice a month.  Patient also mentions that she has lost around 20 pounds since her surgery due to loss of appetite.  Denies having any abdominal pain.  Patient mentions that her energy levels have improved with no chest discomfort or shortness of breath and is able to do 20 minutes on the treadmill which she was not able to do prior to her surgery.    Patient denies having any melena, hematuria.  Denies having any history of gastritis, atrophic gastritis or antacid use.  There is no history of having any malabsorption or celiac disease.  No family history of having iron deficiency/anemia     Patient has never had a colonoscopy or an upper GI endoscopy.      Patient mentions that there is a family history of breast cancer in her mom and aunt.    Patient has tried taking oral iron but gave her stomach upset.  Patient currently remains on aspirin.      Past Medical History:  Past Medical History:    Coronary atherosclerosis    Diabetes (HCC)    High blood pressure    High cholesterol    Hyperlipidemia       Past Surgical History:   Procedure Laterality Date    Cabg  2024    [place] , Triple bypass    Spine surgery procedure unlisted         Home Medications:   metFORMIN  MG Oral Tablet 24 Hr Take 1 tablet (500 mg total) by mouth 2 (two)  times daily with meals. 180 tablet 1    metoprolol tartrate 50 MG Oral Tab Take 1 tablet (50 mg total) by mouth 2x Daily(Beta Blocker). 60 tablet 3    aspirin 81 MG Oral Tab EC Take 1 tablet (81 mg total) by mouth at bedtime.      rosuvastatin 10 MG Oral Tab Take 1 tablet (10 mg total) by mouth nightly.       -------  Current Outpatient Medications on File Prior to Visit   Medication Sig Dispense Refill    metFORMIN  MG Oral Tablet 24 Hr Take 1 tablet (500 mg total) by mouth 2 (two) times daily with meals. 180 tablet 1    metoprolol tartrate 50 MG Oral Tab Take 1 tablet (50 mg total) by mouth 2x Daily(Beta Blocker). 60 tablet 3    aspirin 81 MG Oral Tab EC Take 1 tablet (81 mg total) by mouth at bedtime.      rosuvastatin 10 MG Oral Tab Take 1 tablet (10 mg total) by mouth nightly.       No current facility-administered medications on file prior to visit.       Allergies:   No Known Allergies    Psychosocial History:  Social History     Social History Narrative    Not on file     Social History     Socioeconomic History    Marital status:    Tobacco Use    Smoking status: Never    Smokeless tobacco: Never   Vaping Use    Vaping status: Never Used   Substance and Sexual Activity    Alcohol use: Yes     Comment: OCCASIONAL    Drug use: Never     Social Determinants of Health     Food Insecurity: No Food Insecurity (6/11/2024)    Food Insecurity     Food Insecurity: Never true   Transportation Needs: No Transportation Needs (6/11/2024)    Transportation Needs     Lack of Transportation: No   Housing Stability: Low Risk  (6/11/2024)    Housing Stability     Housing Instability: No       Family Medical History:  Family History   Problem Relation Age of Onset    Breast Cancer Mother 70       Review of Systems:  A 10-point ROS was done with pertinent positives and negative per the HPI    Vital Signs:  Height: --  Weight: 73.6 kg (162 lb 3.2 oz) (09/30 1046)  BSA (Calculated - sq m): --  Pulse: 74 (09/30  1046)  BP: 137/75 (09/30 1046)  Temp: 97.6 °F (36.4 °C) (09/30 1046)  Do Not Use - Resp Rate: --  SpO2: 97 % (09/30 1046)    Wt Readings from Last 6 Encounters:   09/30/24 73.6 kg (162 lb 3.2 oz)   09/26/24 73.1 kg (161 lb 1.6 oz)   09/12/24 72.2 kg (159 lb 3.2 oz)   06/21/24 75.5 kg (166 lb 6.4 oz)   06/15/24 78.2 kg (172 lb 6.4 oz)   06/04/24 77.6 kg (171 lb)       ECOG PS: 0    Physical Examination:  General: Patient is alert and oriented, not in acute distress  Psych: Mood and affect are appropriate  Eyes: EOMI, PERRL  ENT: Oropharynx is clear, no adenopathy  CV: Regular rate and rhythm, normal S1S2, no murmurs, no LE edema  Respiratory: Lungs clear to auscultation bilaterally  GI/Abd: Soft, non-tender with normoactive bowel sounds, no hepatosplenomegaly  Neurological: Grossly intact   Lymphatics: No palpable cervical, supraclavicular, axillary, or inguinal lymphadenopathy  Skin: no rashes or petechiae      Laboratory:  Lab Results   Component Value Date    WBC 5.9 09/11/2024    WBC 7.2 06/15/2024    WBC 9.3 06/14/2024    HGB 8.0 (L) 09/11/2024    HGB 8.3 (L) 06/15/2024    HGB 8.2 (L) 06/14/2024    HCT 28.4 (L) 09/11/2024    MCV 71.2 (L) 09/11/2024    MCH 20.1 (L) 09/11/2024    MCHC 28.2 (L) 09/11/2024    RDW 19.1 09/11/2024    .0 09/11/2024    .0 06/15/2024    .0 06/14/2024     Lab Results   Component Value Date     (H) 09/11/2024    BUN 16 09/11/2024    CREATSERUM 0.81 09/11/2024    CREATSERUM 0.72 06/15/2024    CREATSERUM 0.71 06/14/2024    ANIONGAP 9 09/11/2024    GFR 94 08/23/2017    CA 9.9 09/11/2024    OSMOCALC 292 09/11/2024    ALKPHO 75 09/11/2024    AST 18 09/11/2024    ALT 13 09/11/2024    BILT 0.4 09/11/2024    TP 7.3 09/11/2024    ALB 4.5 09/11/2024    GLOBULIN 2.8 09/11/2024     09/11/2024    K 4.6 09/11/2024     09/11/2024    CO2 22.0 09/11/2024     Lab Results   Component Value Date    PTT 23.7 06/11/2024    INR 1.27 (H) 06/11/2024       Imaging:         Impression & Plan:     69-year-old female with coronary artery disease s/p coronary artery bypass graft is currently on aspirin presents to the clinic to establish care for severe iron deficiency anemia.  Patient is noted to have a hemoglobin around 8,  with low MCV.  Normal platelets and white count noted.  Low suspicion for a bone marrow suppression.     Patient continues to have noted GI blood loss along with appetite loss and weight loss of around 20 pounds.  Patient mentions that most of the weight loss happened in the postsurgical period.    Patient mentions her exercise tolerance, breathing has improved post surgery.  Discussed with the patient that given her cardiac history we would aim to have a higher hemoglobin level.  However as she remains asymptomatic we will proceed with scheduling for IV iron.    Patient was requested to call if she is experiencing any chest pain, shortness of breath or palpitations or lightheadedness.    We will discuss with GI to see if she could undergo colonoscopy earlier.       - iron studies consistent with iron deficiency  - schedule 1000mg IV iron dextran, close monitoring in infusion for reaction  - repeat CBC, iron, tibc, ferritin in 3 months to reassess iron stores        Kelsi Sharma MD  Hematology/Medical Oncology

## 2024-09-30 NOTE — PROGRESS NOTES
Geo Jackson,    Agree we should proceed sooner.  We will arrange.  She should also have an EGD as well because of the iron deficiency and weight loss.    Toni    GI Schedulers:  Can we see if anyone has availability to perform a colonoscopy AND EGD sooner than January in light of the iron deficiency anemia, rectal bleeding and weight loss.

## 2024-09-30 NOTE — PROGRESS NOTES
Education Record    Learner:  Patient    Disease / Diagnosis: Iron Deficiency Anemia     Barriers / Limitations:  None   Comments:    Method:  Discussion   Comments:    General Topics:  Medication and Side effects and symptom management   Comments:    Outcome:  Shows understanding   Comments:    Here for new consult. She is feeling tired. No shortness of breath or dizziness. She has occasional BRBPR that she is seeing GI about. Her GI told her that her colonoscopy is not urgent and not to worry. Her colonoscopy is scheduled for 1/2025.

## 2024-10-02 ENCOUNTER — CARDPULM VISIT (OUTPATIENT)
Dept: CARDIAC REHAB | Facility: HOSPITAL | Age: 69
End: 2024-10-02
Attending: INTERNAL MEDICINE
Payer: MEDICARE

## 2024-10-02 PROCEDURE — 93798 PHYS/QHP OP CAR RHAB W/ECG: CPT

## 2024-10-04 ENCOUNTER — OFFICE VISIT (OUTPATIENT)
Dept: HEMATOLOGY/ONCOLOGY | Facility: HOSPITAL | Age: 69
End: 2024-10-04
Attending: STUDENT IN AN ORGANIZED HEALTH CARE EDUCATION/TRAINING PROGRAM
Payer: MEDICARE

## 2024-10-04 ENCOUNTER — CARDPULM VISIT (OUTPATIENT)
Dept: CARDIAC REHAB | Facility: HOSPITAL | Age: 69
End: 2024-10-04
Attending: INTERNAL MEDICINE
Payer: MEDICARE

## 2024-10-04 VITALS
RESPIRATION RATE: 16 BRPM | BODY MASS INDEX: 25 KG/M2 | TEMPERATURE: 99 F | SYSTOLIC BLOOD PRESSURE: 124 MMHG | OXYGEN SATURATION: 100 % | HEART RATE: 71 BPM | DIASTOLIC BLOOD PRESSURE: 59 MMHG | WEIGHT: 162.5 LBS

## 2024-10-04 DIAGNOSIS — D50.0 IRON DEFICIENCY ANEMIA DUE TO CHRONIC BLOOD LOSS: Primary | ICD-10-CM

## 2024-10-04 PROCEDURE — 96376 TX/PRO/DX INJ SAME DRUG ADON: CPT

## 2024-10-04 PROCEDURE — 96365 THER/PROPH/DIAG IV INF INIT: CPT

## 2024-10-04 PROCEDURE — 93798 PHYS/QHP OP CAR RHAB W/ECG: CPT

## 2024-10-04 NOTE — PROGRESS NOTES
Pt here for Infed . Pt denies any issues or concerns.      Ordering Provider: dr burkett  Order Exp: ongoing     Pt tolerated infusion without difficulty or complaint. Reviewed next apt date/time: in 6 weeks      Education Record  Learner:  Patient  Disease / Diagnosis: MARI  Barriers / Limitations:  None  Method:  Brief focused  General Topics:  Plan of care reviewed  Outcome:  Shows understanding

## 2024-10-07 ENCOUNTER — APPOINTMENT (OUTPATIENT)
Dept: CARDIAC REHAB | Facility: HOSPITAL | Age: 69
End: 2024-10-07
Attending: INTERNAL MEDICINE
Payer: MEDICARE

## 2024-10-07 PROCEDURE — 93798 PHYS/QHP OP CAR RHAB W/ECG: CPT

## 2024-10-09 ENCOUNTER — APPOINTMENT (OUTPATIENT)
Dept: CARDIAC REHAB | Facility: HOSPITAL | Age: 69
End: 2024-10-09
Attending: INTERNAL MEDICINE
Payer: MEDICARE

## 2024-10-11 ENCOUNTER — APPOINTMENT (OUTPATIENT)
Dept: CARDIAC REHAB | Facility: HOSPITAL | Age: 69
End: 2024-10-11
Attending: INTERNAL MEDICINE
Payer: MEDICARE

## 2024-10-14 ENCOUNTER — APPOINTMENT (OUTPATIENT)
Dept: CARDIAC REHAB | Facility: HOSPITAL | Age: 69
End: 2024-10-14
Attending: INTERNAL MEDICINE
Payer: MEDICARE

## 2024-10-16 ENCOUNTER — APPOINTMENT (OUTPATIENT)
Dept: CARDIAC REHAB | Facility: HOSPITAL | Age: 69
End: 2024-10-16
Attending: INTERNAL MEDICINE
Payer: MEDICARE

## 2024-10-18 ENCOUNTER — CARDPULM VISIT (OUTPATIENT)
Dept: CARDIAC REHAB | Facility: HOSPITAL | Age: 69
End: 2024-10-18
Attending: INTERNAL MEDICINE
Payer: MEDICARE

## 2024-10-18 PROCEDURE — 93798 PHYS/QHP OP CAR RHAB W/ECG: CPT

## 2024-10-21 ENCOUNTER — APPOINTMENT (OUTPATIENT)
Dept: CARDIAC REHAB | Facility: HOSPITAL | Age: 69
End: 2024-10-21
Attending: INTERNAL MEDICINE
Payer: MEDICARE

## 2024-10-23 ENCOUNTER — MED REC SCAN ONLY (OUTPATIENT)
Dept: FAMILY MEDICINE CLINIC | Facility: CLINIC | Age: 69
End: 2024-10-23

## 2024-10-23 ENCOUNTER — CARDPULM VISIT (OUTPATIENT)
Dept: CARDIAC REHAB | Facility: HOSPITAL | Age: 69
End: 2024-10-23
Attending: INTERNAL MEDICINE
Payer: MEDICARE

## 2024-10-23 PROCEDURE — 93798 PHYS/QHP OP CAR RHAB W/ECG: CPT

## 2024-10-25 ENCOUNTER — TELEPHONE (OUTPATIENT)
Facility: CLINIC | Age: 69
End: 2024-10-25

## 2024-10-25 ENCOUNTER — CARDPULM VISIT (OUTPATIENT)
Dept: CARDIAC REHAB | Facility: HOSPITAL | Age: 69
End: 2024-10-25
Attending: INTERNAL MEDICINE
Payer: MEDICARE

## 2024-10-25 PROCEDURE — 93798 PHYS/QHP OP CAR RHAB W/ECG: CPT

## 2024-10-25 RX ORDER — SODIUM, POTASSIUM,MAG SULFATES 17.5-3.13G
SOLUTION, RECONSTITUTED, ORAL ORAL
Qty: 1 EACH | Refills: 0 | Status: SHIPPED | OUTPATIENT
Start: 2024-10-25

## 2024-10-25 NOTE — TELEPHONE ENCOUNTER
Patient requesting preparation prescription for 10/29/2024 colonoscopy & Esophagogastroduodenoscopy be sent to pharmacy.  Please call.  Thank you.

## 2024-10-25 NOTE — TELEPHONE ENCOUNTER
Suprep sent to [facility]    Called and spoke to the patient, date of birth and name verified.    She was notified of the above.    The patient verbalized understanding.

## 2024-10-28 ENCOUNTER — TELEPHONE (OUTPATIENT)
Facility: CLINIC | Age: 69
End: 2024-10-28

## 2024-10-28 ENCOUNTER — CARDPULM VISIT (OUTPATIENT)
Dept: CARDIAC REHAB | Facility: HOSPITAL | Age: 69
End: 2024-10-28
Attending: INTERNAL MEDICINE
Payer: MEDICARE

## 2024-10-28 PROCEDURE — 93798 PHYS/QHP OP CAR RHAB W/ECG: CPT

## 2024-10-28 NOTE — TELEPHONE ENCOUNTER
RN received transferred call from [facility]. RN answered pt's questions. Pt verbalized understanding and had no further needs at this time.

## 2024-10-28 NOTE — TELEPHONE ENCOUNTER
Patient calling states has questions regards intake of preparation, already received call for time but forgot to ask. Please call.

## 2024-10-29 ENCOUNTER — ANESTHESIA (OUTPATIENT)
Dept: ENDOSCOPY | Facility: HOSPITAL | Age: 69
End: 2024-10-29
Payer: MEDICARE

## 2024-10-29 ENCOUNTER — HOSPITAL ENCOUNTER (OUTPATIENT)
Facility: HOSPITAL | Age: 69
Setting detail: HOSPITAL OUTPATIENT SURGERY
Discharge: HOME | End: 2024-10-29
Attending: INTERNAL MEDICINE | Admitting: INTERNAL MEDICINE
Payer: MEDICARE

## 2024-10-29 ENCOUNTER — TELEPHONE (OUTPATIENT)
Facility: CLINIC | Age: 69
End: 2024-10-29

## 2024-10-29 ENCOUNTER — ANESTHESIA EVENT (OUTPATIENT)
Dept: ENDOSCOPY | Facility: HOSPITAL | Age: 69
End: 2024-10-29
Payer: MEDICARE

## 2024-10-29 VITALS
DIASTOLIC BLOOD PRESSURE: 62 MMHG | RESPIRATION RATE: 21 BRPM | SYSTOLIC BLOOD PRESSURE: 114 MMHG | OXYGEN SATURATION: 100 % | HEART RATE: 63 BPM | WEIGHT: 157 LBS | BODY MASS INDEX: 23.79 KG/M2 | HEIGHT: 68 IN

## 2024-10-29 DIAGNOSIS — Z12.11 COLON CANCER SCREENING: ICD-10-CM

## 2024-10-29 DIAGNOSIS — D50.9 IRON DEFICIENCY ANEMIA, UNSPECIFIED IRON DEFICIENCY ANEMIA TYPE: ICD-10-CM

## 2024-10-29 DIAGNOSIS — K62.5 RECTAL BLEEDING: ICD-10-CM

## 2024-10-29 DIAGNOSIS — C18.7 CANCER OF SIGMOID (HCC): ICD-10-CM

## 2024-10-29 DIAGNOSIS — R63.4 WEIGHT LOSS: ICD-10-CM

## 2024-10-29 DIAGNOSIS — C20 RECTAL CANCER (HCC): Primary | ICD-10-CM

## 2024-10-29 LAB
GLUCOSE BLDC GLUCOMTR-MCNC: 143 MG/DL (ref 70–99)
HGB BLD-MCNC: 8.6 G/DL

## 2024-10-29 PROCEDURE — 0DBK8ZX EXCISION OF ASCENDING COLON, VIA NATURAL OR ARTIFICIAL OPENING ENDOSCOPIC, DIAGNOSTIC: ICD-10-PCS | Performed by: INTERNAL MEDICINE

## 2024-10-29 PROCEDURE — 0DBN8ZX EXCISION OF SIGMOID COLON, VIA NATURAL OR ARTIFICIAL OPENING ENDOSCOPIC, DIAGNOSTIC: ICD-10-PCS | Performed by: INTERNAL MEDICINE

## 2024-10-29 PROCEDURE — 45380 COLONOSCOPY AND BIOPSY: CPT | Performed by: INTERNAL MEDICINE

## 2024-10-29 PROCEDURE — 3E0H8GC INTRODUCTION OF OTHER THERAPEUTIC SUBSTANCE INTO LOWER GI, VIA NATURAL OR ARTIFICIAL OPENING ENDOSCOPIC: ICD-10-PCS | Performed by: INTERNAL MEDICINE

## 2024-10-29 PROCEDURE — 45385 COLONOSCOPY W/LESION REMOVAL: CPT | Performed by: INTERNAL MEDICINE

## 2024-10-29 PROCEDURE — 0DBM8ZX EXCISION OF DESCENDING COLON, VIA NATURAL OR ARTIFICIAL OPENING ENDOSCOPIC, DIAGNOSTIC: ICD-10-PCS | Performed by: INTERNAL MEDICINE

## 2024-10-29 PROCEDURE — 0DBL8ZX EXCISION OF TRANSVERSE COLON, VIA NATURAL OR ARTIFICIAL OPENING ENDOSCOPIC, DIAGNOSTIC: ICD-10-PCS | Performed by: INTERNAL MEDICINE

## 2024-10-29 PROCEDURE — 0DBP8ZX EXCISION OF RECTUM, VIA NATURAL OR ARTIFICIAL OPENING ENDOSCOPIC, DIAGNOSTIC: ICD-10-PCS | Performed by: INTERNAL MEDICINE

## 2024-10-29 PROCEDURE — 43239 EGD BIOPSY SINGLE/MULTIPLE: CPT | Performed by: INTERNAL MEDICINE

## 2024-10-29 PROCEDURE — 45381 COLONOSCOPY SUBMUCOUS NJX: CPT | Performed by: INTERNAL MEDICINE

## 2024-10-29 RX ORDER — LIDOCAINE HYDROCHLORIDE 10 MG/ML
INJECTION, SOLUTION EPIDURAL; INFILTRATION; INTRACAUDAL; PERINEURAL AS NEEDED
Status: DISCONTINUED | OUTPATIENT
Start: 2024-10-29 | End: 2024-10-29 | Stop reason: SURG

## 2024-10-29 RX ORDER — NALOXONE HYDROCHLORIDE 0.4 MG/ML
0.08 INJECTION, SOLUTION INTRAMUSCULAR; INTRAVENOUS; SUBCUTANEOUS ONCE AS NEEDED
Status: DISCONTINUED | OUTPATIENT
Start: 2024-10-29 | End: 2024-10-29

## 2024-10-29 RX ORDER — SODIUM CHLORIDE, SODIUM LACTATE, POTASSIUM CHLORIDE, CALCIUM CHLORIDE 600; 310; 30; 20 MG/100ML; MG/100ML; MG/100ML; MG/100ML
INJECTION, SOLUTION INTRAVENOUS CONTINUOUS
Status: DISCONTINUED | OUTPATIENT
Start: 2024-10-29 | End: 2024-10-29

## 2024-10-29 RX ORDER — PHENYLEPHRINE HCL 10 MG/ML
VIAL (ML) INJECTION AS NEEDED
Status: DISCONTINUED | OUTPATIENT
Start: 2024-10-29 | End: 2024-10-29 | Stop reason: SURG

## 2024-10-29 RX ADMIN — PHENYLEPHRINE HCL 100 MCG: 10 MG/ML VIAL (ML) INJECTION at 08:41:00

## 2024-10-29 RX ADMIN — PHENYLEPHRINE HCL 100 MCG: 10 MG/ML VIAL (ML) INJECTION at 08:29:00

## 2024-10-29 RX ADMIN — LIDOCAINE HYDROCHLORIDE 80 MG: 10 INJECTION, SOLUTION EPIDURAL; INFILTRATION; INTRACAUDAL; PERINEURAL at 08:19:00

## 2024-10-29 RX ADMIN — PHENYLEPHRINE HCL 100 MCG: 10 MG/ML VIAL (ML) INJECTION at 08:35:00

## 2024-10-29 RX ADMIN — SODIUM CHLORIDE, SODIUM LACTATE, POTASSIUM CHLORIDE, CALCIUM CHLORIDE: 600; 310; 30; 20 INJECTION, SOLUTION INTRAVENOUS at 09:12:00

## 2024-10-29 NOTE — ANESTHESIA POSTPROCEDURE EVALUATION
Patient: Ilda Gutierrez    Procedure Summary       Date: 10/29/24 Room / Location: [facility] ENDOSCOPY 04 / [facility] ENDOSCOPY    Anesthesia Start: 0816 Anesthesia Stop: 0917    Procedures:       COLONOSCOPY      ESOPHAGOGASTRODUODENOSCOPY (EGD) Diagnosis:       Colon cancer screening      Rectal bleeding      Weight loss      Iron deficiency anemia, unspecified iron deficiency anemia type      (colon polyps, sigmoid mass, rectal mass, diverticulosis; hiatal hernia)    Surgeons: Toni Anderson MD Anesthesiologist: Cresencio Spann CRNA    Anesthesia Type: MAC ASA Status: 3            Anesthesia Type: MAC    Vitals Value Taken Time   /55 10/29/24 0917   Temp 97.3 10/29/24 0917   Pulse 63 10/29/24 0917   Resp 14 10/29/24 0917   SpO2 100 % 10/29/24 0917   Vitals shown include unfiled device data.    [facility] AN Post Evaluation:   Patient Evaluated in PACU  Patient Participation: complete - patient participated  Level of Consciousness: awake  Pain Score: 0  Pain Management: adequate  Airway Patency:patent  Dental exam unchanged from preop  Yes    Nausea/Vomiting: none  Cardiovascular Status: acceptable and stable  Respiratory Status: room air  Postoperative Hydration acceptable      Cresencio Spann CRNA  10/29/2024 9:17 AM

## 2024-10-29 NOTE — TELEPHONE ENCOUNTER
I spoke to the patient and to her .  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  MSI testing is pending.  The patient's hemoglobin is stable at 8.6.  I have apprised the patient and her  of the diagnosis of rectal and sigmoid colon cancers.  The polyps were benign adenomas.  The gastric and duodenal biopsies were negative.  I have recommended the followin.  CT scan of the chest, abdomen and pelvis.  2.  Follow-up with medical oncology and consultation with surgical oncology.    I will have our oncology nurse navigator reach out to the patient to assist in the above appointments.  Colonoscopy if prognosis is favorable should be repeated in 1 year.    Zachary Farnsworth and Olivia.  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  The upper endoscopy was unremarkable.    Christiano, can you assist the patient in scheduling a CT scan of the chest, abdomen and pelvis and securing a sooner appointment with Dr. Sharma.  MSI testing is pending.

## 2024-10-29 NOTE — ANESTHESIA PREPROCEDURE EVALUATION
Anesthesia PreOp Note    HPI:     Ilda Gutierrez is a 69 year old female who presents for preoperative consultation requested by: Toni Anderson MD    Date of Surgery: 10/29/2024    Procedure(s):  COLONOSCOPY  ESOPHAGOGASTRODUODENOSCOPY (EGD)  Indication: Colon cancer screening/ Rectal bleeding/Weight loss/Iron deficiency anemia, unspecified iron deficiency anemia type    Relevant Problems   No relevant active problems       NPO:  Last Liquid Consumption Date: 10/29/24  Last Liquid Consumption Time: 0300  Last Solid Consumption Date: 10/27/24  Last Solid Consumption Time: 1630  Last Liquid Consumption Date: 10/29/24          History Review:  Patient Active Problem List    Diagnosis Date Noted    Iron deficiency anemia 09/30/2024    S/P CABG x 3 06/12/2024    Chest pain, precordial 06/06/2024    Abnormal stress test 06/06/2024    Anemia 06/06/2024    Coronary artery disease involving native coronary artery of native heart with unstable angina pectoris (HCC) 06/06/2024    Type 2 diabetes mellitus without complication, without long-term current use of insulin (HCC) 01/25/2024    Hypercholesterolemia 01/25/2024       Past Medical History:    Coronary atherosclerosis    Diabetes (HCC)    High blood pressure    High cholesterol    Hyperlipidemia       Past Surgical History:   Procedure Laterality Date    Cabg  2024    [facility] , Triple bypass    Spine surgery procedure unlisted         Prescriptions Prior to Admission[1]  Current Medications and Prescriptions Ordered in Epic[2]    Allergies[3]    Family History   Problem Relation Age of Onset    Breast Cancer Mother 70     Social History     Socioeconomic History    Marital status:    Tobacco Use    Smoking status: Never    Smokeless tobacco: Never   Vaping Use    Vaping status: Never Used   Substance and Sexual Activity    Alcohol use: Yes     Comment: OCCASIONAL    Drug use: Never       Available pre-op labs reviewed.  Lab Results   Component Value  Date    WBC 5.9 09/11/2024    RBC 3.99 09/11/2024    HGB 8.0 (L) 09/11/2024    HCT 28.4 (L) 09/11/2024    MCV 71.2 (L) 09/11/2024    MCH 20.1 (L) 09/11/2024    MCHC 28.2 (L) 09/11/2024    RDW 19.1 09/11/2024    .0 09/11/2024     Lab Results   Component Value Date     09/11/2024    K 4.6 09/11/2024     09/11/2024    CO2 22.0 09/11/2024    BUN 16 09/11/2024    CREATSERUM 0.81 09/11/2024     (H) 09/11/2024    CA 9.9 09/11/2024          Vital Signs:  Body mass index is 23.87 kg/m².   height is 1.727 m (5' 8\") and weight is 71.2 kg (157 lb). Her blood pressure is 118/67 and her pulse is 80. Her respiration is 12 and oxygen saturation is 98%.   Vitals:    10/25/24 1532 10/29/24 0734   BP:  118/67   Pulse:  80   Resp:  12   SpO2:  98%   Weight: 71.2 kg (157 lb)    Height: 1.727 m (5' 8\")         Anesthesia Evaluation     Patient summary reviewed and Nursing notes reviewed    Airway   Mallampati: II  TM distance: >3 FB  Neck ROM: full  Dental      Pulmonary - negative ROS    breath sounds clear to auscultation  Cardiovascular   Exercise tolerance: good  (+) hypertension, CAD    ECG reviewed  Rhythm: regular  Rate: normal  ROS comment: Cardiac cath, echo done in 6/2024  S/P CABG x3 6/2024  Pt denies CP/SOB/CORDON    Neuro/Psych - negative ROS     GI/Hepatic/Renal      Endo/Other    (+) diabetes mellitus type 2 well controlled  Abdominal                  Anesthesia Plan:   ASA:  3  Plan:   MAC  Informed Consent Plan and Risks Discussed With:  Patient  Use of Blood Products Discussed With:  Patient  Discussed plan with:  CRNA      I have informed Ilda Gutierrez and/or legal guardian or family member of the nature of the anesthetic plan, benefits, risks including possible dental damage if relevant, major complications, and any alternative forms of anesthetic management.   All of the patient's questions were answered to the best of my ability. The patient desires the anesthetic management as  planned.  Cresencio SpannULISES  10/29/2024 7:41 AM  Present on Admission:  **None**           [1]   Medications Prior to Admission   Medication Sig Dispense Refill Last Dose/Taking    metFORMIN  MG Oral Tablet 24 Hr Take 1 tablet (500 mg total) by mouth 2 (two) times daily with meals. 180 tablet 1 10/27/2024    metoprolol tartrate 50 MG Oral Tab Take 1 tablet (50 mg total) by mouth 2x Daily(Beta Blocker). 60 tablet 3 10/29/2024 Morning    aspirin 81 MG Oral Tab EC Take 1 tablet (81 mg total) by mouth at bedtime.   10/27/2024    rosuvastatin 10 MG Oral Tab Take 1 tablet (10 mg total) by mouth nightly.   10/28/2024    Na Sulfate-K Sulfate-Mg Sulf (SUPREP BOWEL PREP KIT) 17.5-3.13-1.6 GM/177ML Oral Solution Split dose. Please take as directed by the [facility] department. 1 each 0    [2]   Current Facility-Administered Medications Ordered in Epic   Medication Dose Route Frequency Provider Last Rate Last Admin    lactated ringers infusion   Intravenous Continuous Toni Anderson MD 20 mL/hr at 10/29/24 0736 New Bag at 10/29/24 0736     No current [facility]-ordered outpatient medications on file.   [3] No Known Allergies

## 2024-10-29 NOTE — H&P
History & Physical Examination    Patient Name: Ilda Gutierrez  MRN: [id number]  CSN: [id number]  YOB: 1955    Diagnosis: Iron deficiency anemia, rectal bleeding      Prescriptions Prior to Admission[1]  Current Facility-Administered Medications   Medication Dose Route Frequency    lactated ringers infusion   Intravenous Continuous       Allergies: Allergies[2]    Past Medical History:    Coronary atherosclerosis    Diabetes (HCC)    High blood pressure    High cholesterol    Hyperlipidemia     Past Surgical History:   Procedure Laterality Date    Cabg  2024    [facility] , Triple bypass    Colonoscopy N/A 10/29/2024    Dr. Anderson;    Egd N/A 10/29/2024    Dr. Anderson    Spine surgery procedure unlisted       Family History   Problem Relation Age of Onset    Breast Cancer Mother 70     Social History     Tobacco Use    Smoking status: Never    Smokeless tobacco: Never   Substance Use Topics    Alcohol use: Yes     Comment: OCCASIONAL       SYSTEM Check if Review is Normal Check if Physical Exam is Normal If not normal, please explain:   HEENT [X ] [ X]    NECK  [X ] [ X]    HEART [X ] [ X]    LUNGS [X ] [ X]    ABDOMEN [X ] [ X]    EXTREMITIES [X ] [ X]    OTHER        [ x ] I have discussed the risks and benefits and alternatives with the patient/family.  They understand and agree to proceed with plan of care.  [ x ] I have reviewed the History and Physical done within the last 30 days.  Any changes noted above.    Toni Anderson MD  10/29/2024  8:18 AM             [1]   Medications Prior to Admission   Medication Sig Dispense Refill Last Dose/Taking    metFORMIN  MG Oral Tablet 24 Hr Take 1 tablet (500 mg total) by mouth 2 (two) times daily with meals. 180 tablet 1 10/27/2024    metoprolol tartrate 50 MG Oral Tab Take 1 tablet (50 mg total) by mouth 2x Daily(Beta Blocker). 60 tablet 3 10/29/2024 Morning    aspirin 81 MG Oral Tab EC Take 1 tablet (81 mg total) by mouth at  bedtime.   10/27/2024    rosuvastatin 10 MG Oral Tab Take 1 tablet (10 mg total) by mouth nightly.   10/28/2024    Na Sulfate-K Sulfate-Mg Sulf (SUPREP BOWEL PREP KIT) 17.5-3.13-1.6 GM/177ML Oral Solution Split dose. Please take as directed by the [facility] department. 1 each 0    [2] No Known Allergies

## 2024-10-29 NOTE — OPERATIVE REPORT
[facility] Endoscopy Report      Date of Procedure:  10/29/24      Preoperative Diagnosis:    1.  Iron deficiency anemia  2.  Rectal bleeding      Postoperative Diagnosis:  1.  Synchronous sigmoid and rectal masses  2.  Colon polyps  3.  Sigmoid colon diverticulosis  4.  Small hiatal hernia      Procedure:    Colonoscopy with polypectomy, biopsy and tattoo of sigmoid colon mass  Esophagogastroduodenoscopy with biopsy      Surgeon:  Toni Anderson M.D.      Anesthesia:  Monitored anesthesia care  Cecal withdrawal time: 35 minutes  EBL:  Insignificant      Brief History:  This is a 69 year old female who presents for endoscopic evaluation of an iron deficiency anemia diagnosed a few months prior.  She has noted intermittent rectal bleeding.  She has been on aspirin since coronary bypass grafting in June 2024.  No NSAID use.  She is otherwise asymptomatic from a gastrointestinal tract standpoint.      Technique:  After informed consent, the patient was placed in the left lateral recumbent position.  Digital rectal examination revealed a hard fixed lesion at the tip of the examining gloved finger (7-8 cm).  An Olympus variable stiffness 190 series HD colonoscope was inserted into the rectum and advanced under direct vision by following the lumen to the terminal ileum.  The colon was examined upon withdrawal in the left lateral recumbent position.    Following colonoscopy, an Olympus adult HD gastroscope was inserted into the hypopharynx and advanced under direct vision into the esophagus, stomach and duodenum.  The endoscope was withdrawn to the stomach where retroflexion of the angulus, body, cardia and fundus was performed.  The instrument was straightened, insufflated air and fluid were suctioned and the endoscope was withdrawn.  The procedures were well tolerated without immediate complication.      Findings:  The preparation of the colon was good.  There was bloody effluent present in the  rectum and left colon.  The preparation was otherwise good.  The terminal ileum was examined for 5 cm and visually normal.  The ileocecal valve was well preserved. The visualized colonic mucosa from the cecum to the anal verge was normal with an intact vascular pattern.  Pertinent findings as follows:    1.  There were #2 synchronous short friable ulcerated apple core lesions in the distal sigmoid at 21 cm and in the proximal rectum at about 7-8 cm.  Prior to any manipulation of either mass a carbon based tattoo was applied immediately DISTAL to the SIGMOID lesion.  The rectal lesion was not tattooed as this was palpable on rectal examination.  Biopsies of each mass were obtained and placed in separate containers.    2.  There were #4 2-4 mm sessile polyps in the proximal ascending colon, hepatic flexure, descending and proximal sigmoid which were either cold snare excised or removed with a cold biopsy forceps (again prior to any manipulation of the aforementioned masses).    Inspection of all sites revealed no evidence of ongoing bleeding.  There was a least #1 diverticulum seen in the sigmoid colon without signs of complication..  There were no other colonic polyps, mass lesions, vascular anomalies or signs of inflammation seen.  Retroflexion in the rectum revealed no abnormalities.    The esophagus was normal without evidence of ulceration, erosion, stricture, ring or Jorgensen's esophagus.  The GE junction and diaphragmatic impression were at 37 cm with a 1 cm sliding hiatal hernia.  The stomach distended appropriately with insufflated air.  The mucosa of the stomach including cardia, fundus, gastric body and antrum was normal.  Biopsies from the antrum were obtained.  The duodenal bulb and post bulbar regions were normal.  Biopsies from the duodenum were obtained.      Impression:  1.  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  Lymphoma would be a less likely consideration.  2.  Diminutive colon polyps  3.  Sigmoid colon diverticulosis  4.  Small hiatal hernia    Recommendations:  1.  Standard postprocedural instructions given.  2.  Repeat CBC today.  3.  Follow-up biopsy results.  4.  CT scan of the chest, abdomen and pelvis and follow-up with medical and surgical oncology.          Toni Anderson MD  10/29/2024  9:18 AM

## 2024-10-30 ENCOUNTER — CARDPULM VISIT (OUTPATIENT)
Dept: CARDIAC REHAB | Facility: HOSPITAL | Age: 69
End: 2024-10-30
Attending: INTERNAL MEDICINE
Payer: MEDICARE

## 2024-10-30 PROCEDURE — 93798 PHYS/QHP OP CAR RHAB W/ECG: CPT

## 2024-10-31 ENCOUNTER — HOSPITAL ENCOUNTER (OUTPATIENT)
Dept: MRI IMAGING | Facility: HOSPITAL | Age: 69
Discharge: HOME | End: 2024-10-31
Attending: INTERNAL MEDICINE
Payer: MEDICARE

## 2024-10-31 ENCOUNTER — TELEPHONE (OUTPATIENT)
Dept: HEMATOLOGY/ONCOLOGY | Facility: HOSPITAL | Age: 69
End: 2024-10-31

## 2024-10-31 DIAGNOSIS — C20 RECTAL CANCER (HCC): ICD-10-CM

## 2024-10-31 PROCEDURE — A9575 INJ GADOTERATE MEGLUMI 0.1ML: HCPCS | Performed by: INTERNAL MEDICINE

## 2024-10-31 PROCEDURE — 72197 MRI PELVIS W/O & W/DYE: CPT | Performed by: INTERNAL MEDICINE

## 2024-10-31 RX ORDER — GADOTERATE MEGLUMINE 376.9 MG/ML
15 INJECTION INTRAVENOUS
Status: COMPLETED | OUTPATIENT
Start: 2024-10-31 | End: 2024-10-31

## 2024-10-31 RX ADMIN — GADOTERATE MEGLUMINE 15 ML: 376.9 INJECTION INTRAVENOUS at 13:20:00

## 2024-11-01 ENCOUNTER — CARDPULM VISIT (OUTPATIENT)
Dept: CARDIAC REHAB | Facility: HOSPITAL | Age: 69
End: 2024-11-01
Attending: INTERNAL MEDICINE
Payer: MEDICARE

## 2024-11-01 PROCEDURE — 93798 PHYS/QHP OP CAR RHAB W/ECG: CPT

## 2024-11-04 ENCOUNTER — CARDPULM VISIT (OUTPATIENT)
Dept: CARDIAC REHAB | Facility: HOSPITAL | Age: 69
End: 2024-11-04
Attending: INTERNAL MEDICINE
Payer: MEDICARE

## 2024-11-04 PROCEDURE — 93798 PHYS/QHP OP CAR RHAB W/ECG: CPT

## 2024-11-06 ENCOUNTER — CARDPULM VISIT (OUTPATIENT)
Dept: CARDIAC REHAB | Facility: HOSPITAL | Age: 69
End: 2024-11-06
Attending: INTERNAL MEDICINE
Payer: MEDICARE

## 2024-11-06 ENCOUNTER — HOSPITAL ENCOUNTER (OUTPATIENT)
Dept: CT IMAGING | Facility: HOSPITAL | Age: 69
Discharge: HOME | End: 2024-11-06
Attending: INTERNAL MEDICINE
Payer: MEDICARE

## 2024-11-06 DIAGNOSIS — C20 RECTAL CANCER (HCC): ICD-10-CM

## 2024-11-06 DIAGNOSIS — C18.7 CANCER OF SIGMOID (HCC): ICD-10-CM

## 2024-11-06 LAB
CREAT BLD-MCNC: 0.8 MG/DL
EGFRCR SERPLBLD CKD-EPI 2021: 80 ML/MIN/1.73M2 (ref 60–?)

## 2024-11-06 PROCEDURE — 82565 ASSAY OF CREATININE: CPT

## 2024-11-06 PROCEDURE — 71260 CT THORAX DX C+: CPT | Performed by: INTERNAL MEDICINE

## 2024-11-06 PROCEDURE — 74177 CT ABD & PELVIS W/CONTRAST: CPT | Performed by: INTERNAL MEDICINE

## 2024-11-06 PROCEDURE — 93798 PHYS/QHP OP CAR RHAB W/ECG: CPT

## 2024-11-06 NOTE — PROGRESS NOTES
Hematology/Oncology Clinic Follow Up Visit    Patient Name: Ilda Gutierrez  Medical Record Number: [id number]    YOB: 1955   PCP: Kasey Mendenhall MD    Reason for Consultation:  Ilda Gutierrez was seen today for the diagnosis of synchronous sigmoid and rectal adenocarcinoma    Oncologic History:  9/30/24: visit noted to have blood in stool, iron deficiency anemia  10/29/24: s/p colonoscopy with sigmoid colon mass (at 21cm) and rectal mass (at 7-8cm) with path for both positive for moderately differentiated adenocarcinoma. RENÉ.  10/31/24: Rectal MRI reviewed at rectal tumor board; circumferential mass of upper to mid rectum measuring 6cm. T3bN0. Sigmoid mass adjacent to rectal mass. Separate surgeries cannot be performed.    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    - had 3vCABG in 6/2024  - notes that the bleeding possibly started occurring after her cardiac surgery   - she is now in cardiac rehab 3 times a week  - has been having unintentional weight loss of > 20 lbs  - she feels no different after her iron infusion  - no pain  - FH of breast cancer in mother and maternal aunt  - her cardiologist is Dr Saleem    Past Medical History:  Past Medical History:    Coronary atherosclerosis    Diabetes (HCC)    High blood pressure    High cholesterol    Hyperlipidemia     Past Surgical History:   Procedure Laterality Date    Cabg  2024    [facility] , Triple bypass    Colonoscopy N/A 10/29/2024    Dr. Anderson; polyps, sigmoid mass, rectal mass, diverticulosis    Colonoscopy N/A 10/29/2024    Procedure: COLONOSCOPY;  Surgeon: Toni Anderson MD;  Location: [facility] ENDOSCOPY    Egd N/A 10/29/2024    Dr. Anderson; hiatal hernia    Spine surgery procedure unlisted         Home Medications:  No outpatient medications have been marked as taking for the 11/7/24 encounter  (Appointment) with Homero Carvalho MD.       Allergies:   Allergies[1]    Psychosocial History:  Social History     Socioeconomic History    Marital status:      Spouse name: Not on file    Number of children: Not on file    Years of education: Not on file    Highest education level: Not on file   Occupational History    Not on file   Tobacco Use    Smoking status: Never    Smokeless tobacco: Never   Vaping Use    Vaping status: Never Used   Substance and Sexual Activity    Alcohol use: Yes     Comment: OCCASIONAL    Drug use: Never    Sexual activity: Not on file   Other Topics Concern    Not on file   Social History Narrative    Not on file     Social Drivers of Health     Financial Resource Strain: Not on file   Food Insecurity: No Food Insecurity (6/11/2024)    Food Insecurity     Food Insecurity: Never true   Transportation Needs: No Transportation Needs (6/11/2024)    Transportation Needs     Lack of Transportation: No     Car Seat: Not on file   Physical Activity: Not on file   Stress: Not on file   Social Connections: Not on file   Housing Stability: Low Risk  (6/11/2024)    Housing Stability     Housing Instability: No     Housing Instability Emergency: Not on file     Crib or Bassinette: Not on file       Family Medical History:  Family History   Problem Relation Age of Onset    Breast Cancer Mother 70       Review of Systems:  A 10-point ROS was done with pertinent positives and negative per the HPI    Vital Signs:  Height: --  Weight: --  BSA (Calculated - sq m): --  Pulse: --  BP: --  Temp: --  Do Not Use - Resp Rate: --  SpO2: --    Wt Readings from Last 6 Encounters:   10/25/24 71.2 kg (157 lb)   10/04/24 73.7 kg (162 lb 8 oz)   09/30/24 73.6 kg (162 lb 3.2 oz)   09/26/24 73.1 kg (161 lb 1.6 oz)   09/12/24 72.2 kg (159 lb 3.2 oz)   06/21/24 75.5 kg (166 lb 6.4 oz)       ECOG PS: 1    Physical Examination:  General: Patient is alert and oriented, not in acute distress  Psych: Mood and affect are  appropriate  Eyes: EOMI, PERRL  ENT: Oropharynx is clear, no adenopathy  CV: nl S1/S2, no LE edema  Respiratory: CTAB, non-labored respirations  GI/Abd: Soft, non-tender   Neurological: Grossly intact   Lymphatics: No palpable inguinal lymphadenopathy  Skin: no rashes or petechiae    Laboratory:  Lab Results   Component Value Date    WBC 5.9 09/11/2024    WBC 7.2 06/15/2024    WBC 9.3 06/14/2024    HGB 8.6 (L) 10/29/2024    HGB 8.0 (L) 09/11/2024    HGB 8.3 (L) 06/15/2024    HCT 28.4 (L) 09/11/2024    MCV 71.2 (L) 09/11/2024    MCH 20.1 (L) 09/11/2024    MCHC 28.2 (L) 09/11/2024    RDW 19.1 09/11/2024    .0 09/11/2024    .0 06/15/2024    .0 06/14/2024     Lab Results   Component Value Date     (H) 09/11/2024    BUN 16 09/11/2024    CREATSERUM 0.81 09/11/2024    CREATSERUM 0.72 06/15/2024    CREATSERUM 0.71 06/14/2024    ANIONGAP 9 09/11/2024    GFR 94 08/23/2017    CA 9.9 09/11/2024    OSMOCALC 292 09/11/2024    ALKPHO 75 09/11/2024    AST 18 09/11/2024    ALT 13 09/11/2024    BILT 0.4 09/11/2024    TP 7.3 09/11/2024    ALB 4.5 09/11/2024    GLOBULIN 2.8 09/11/2024     09/11/2024    K 4.6 09/11/2024     09/11/2024    CO2 22.0 09/11/2024     Lab Results   Component Value Date    PTT 23.7 06/11/2024    INR 1.27 (H) 06/11/2024     Impression & Plan:     Synchronous rectal and sigmoid colon adenocarcinoma  - T3bN0 on rectal MRI for rectal cancer, with adjacent sigmoid colon mass. Both adenocarcinoma, RENÉ  - reviewed at rectal tumor board; due to close proximity, separate surgeries cannot be performed  - we discussed that under ordinary circumstances, her rectal cancer in isolation would be treated with total neoadjuvant therapy with neoadjuvant chemo-radiation followed by chemotherapy with a chance of not needing surgery. Her colon cancer in isolation would be treated with upfront resection followed by possible chemotherapy. However given that she has synchronous colon and rectal  primaries, I recommended neoadjuvant FOLFOX which is highly active against both colon and rectal adenocarcinoma. This is supported by the FOXTROT study which found that 6 weeks of neoadjuvant FOLFOX in colon cancer increased pathologic downstaging, and the PROSPECT trial which found that neoadjuvant therapy with 3 months of FOLFOX with sphincter-sparing surgery was noninferior to the usage of neoadjuvant chemo-radiation.  - recommended neoadjuvant chemotherapy with 6 cycles of FOLFOX with subsequent restaging scans; and at that time we will evaluate if she should proceed to surgery then  - plan for port placement, chemo ed    Iron deficiency anemia  - s/p IV iron dextran 1000mg. Recheck iron studies in ~1/2025    CAD  - s/p 3v CABG 6/2024  - on ASA 81mg  - Dr Saleem is her cardiologist    F/u: port, chemo ed, chemo    Homero Carvalho  Hematology/Medical Oncology  [facility]             [1] No Known Allergies

## 2024-11-06 NOTE — H&P (VIEW-ONLY)
Hematology/Oncology Clinic Follow Up Visit    Patient Name: Ilda Gutierrez  Medical Record Number: [id number]    YOB: 1955   PCP: Kasey Mendenhall MD    Reason for Consultation:  Ilda Gutierrez was seen today for the diagnosis of synchronous sigmoid and rectal adenocarcinoma    Oncologic History:  9/30/24: visit noted to have blood in stool, iron deficiency anemia  10/29/24: s/p colonoscopy with sigmoid colon mass (at 21cm) and rectal mass (at 7-8cm) with path for both positive for moderately differentiated adenocarcinoma. RENÉ.  10/31/24: Rectal MRI reviewed at rectal tumor board; circumferential mass of upper to mid rectum measuring 6cm. T3bN0. Sigmoid mass adjacent to rectal mass. Separate surgeries cannot be performed.    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    - had 3vCABG in 6/2024  - notes that the bleeding possibly started occurring after her cardiac surgery   - she is now in cardiac rehab 3 times a week  - has been having unintentional weight loss of > 20 lbs  - she feels no different after her iron infusion  - no pain  - FH of breast cancer in mother and maternal aunt  - her cardiologist is Dr Saleem    Past Medical History:  Past Medical History:    Coronary atherosclerosis    Diabetes (HCC)    High blood pressure    High cholesterol    Hyperlipidemia     Past Surgical History:   Procedure Laterality Date    Cabg  2024    [facility] , Triple bypass    Colonoscopy N/A 10/29/2024    Dr. Anderson; polyps, sigmoid mass, rectal mass, diverticulosis    Colonoscopy N/A 10/29/2024    Procedure: COLONOSCOPY;  Surgeon: Toni Anderson MD;  Location: [facility] ENDOSCOPY    Egd N/A 10/29/2024    Dr. Anderson; hiatal hernia    Spine surgery procedure unlisted         Home Medications:  No outpatient medications have been marked as taking for the 11/7/24 encounter  (Appointment) with Homero Carvalho MD.       Allergies:   Allergies[1]    Psychosocial History:  Social History     Socioeconomic History    Marital status:      Spouse name: Not on file    Number of children: Not on file    Years of education: Not on file    Highest education level: Not on file   Occupational History    Not on file   Tobacco Use    Smoking status: Never    Smokeless tobacco: Never   Vaping Use    Vaping status: Never Used   Substance and Sexual Activity    Alcohol use: Yes     Comment: OCCASIONAL    Drug use: Never    Sexual activity: Not on file   Other Topics Concern    Not on file   Social History Narrative    Not on file     Social Drivers of Health     Financial Resource Strain: Not on file   Food Insecurity: No Food Insecurity (6/11/2024)    Food Insecurity     Food Insecurity: Never true   Transportation Needs: No Transportation Needs (6/11/2024)    Transportation Needs     Lack of Transportation: No     Car Seat: Not on file   Physical Activity: Not on file   Stress: Not on file   Social Connections: Not on file   Housing Stability: Low Risk  (6/11/2024)    Housing Stability     Housing Instability: No     Housing Instability Emergency: Not on file     Crib or Bassinette: Not on file       Family Medical History:  Family History   Problem Relation Age of Onset    Breast Cancer Mother 70       Review of Systems:  A 10-point ROS was done with pertinent positives and negative per the HPI    Vital Signs:  Height: --  Weight: --  BSA (Calculated - sq m): --  Pulse: --  BP: --  Temp: --  Do Not Use - Resp Rate: --  SpO2: --    Wt Readings from Last 6 Encounters:   10/25/24 71.2 kg (157 lb)   10/04/24 73.7 kg (162 lb 8 oz)   09/30/24 73.6 kg (162 lb 3.2 oz)   09/26/24 73.1 kg (161 lb 1.6 oz)   09/12/24 72.2 kg (159 lb 3.2 oz)   06/21/24 75.5 kg (166 lb 6.4 oz)       ECOG PS: 1    Physical Examination:  General: Patient is alert and oriented, not in acute distress  Psych: Mood and affect are  appropriate  Eyes: EOMI, PERRL  ENT: Oropharynx is clear, no adenopathy  CV: nl S1/S2, no LE edema  Respiratory: CTAB, non-labored respirations  GI/Abd: Soft, non-tender   Neurological: Grossly intact   Lymphatics: No palpable inguinal lymphadenopathy  Skin: no rashes or petechiae    Laboratory:  Lab Results   Component Value Date    WBC 5.9 09/11/2024    WBC 7.2 06/15/2024    WBC 9.3 06/14/2024    HGB 8.6 (L) 10/29/2024    HGB 8.0 (L) 09/11/2024    HGB 8.3 (L) 06/15/2024    HCT 28.4 (L) 09/11/2024    MCV 71.2 (L) 09/11/2024    MCH 20.1 (L) 09/11/2024    MCHC 28.2 (L) 09/11/2024    RDW 19.1 09/11/2024    .0 09/11/2024    .0 06/15/2024    .0 06/14/2024     Lab Results   Component Value Date     (H) 09/11/2024    BUN 16 09/11/2024    CREATSERUM 0.81 09/11/2024    CREATSERUM 0.72 06/15/2024    CREATSERUM 0.71 06/14/2024    ANIONGAP 9 09/11/2024    GFR 94 08/23/2017    CA 9.9 09/11/2024    OSMOCALC 292 09/11/2024    ALKPHO 75 09/11/2024    AST 18 09/11/2024    ALT 13 09/11/2024    BILT 0.4 09/11/2024    TP 7.3 09/11/2024    ALB 4.5 09/11/2024    GLOBULIN 2.8 09/11/2024     09/11/2024    K 4.6 09/11/2024     09/11/2024    CO2 22.0 09/11/2024     Lab Results   Component Value Date    PTT 23.7 06/11/2024    INR 1.27 (H) 06/11/2024     Impression & Plan:     Synchronous rectal and sigmoid colon adenocarcinoma  - T3bN0 on rectal MRI for rectal cancer, with adjacent sigmoid colon mass. Both adenocarcinoma, RENÉ  - reviewed at rectal tumor board; due to close proximity, separate surgeries cannot be performed  - we discussed that under ordinary circumstances, her rectal cancer in isolation would be treated with total neoadjuvant therapy with neoadjuvant chemo-radiation followed by chemotherapy with a chance of not needing surgery. Her colon cancer in isolation would be treated with upfront resection followed by possible chemotherapy. However given that she has synchronous colon and rectal  primaries, I recommended neoadjuvant FOLFOX which is highly active against both colon and rectal adenocarcinoma. This is supported by the FOXTROT study which found that 6 weeks of neoadjuvant FOLFOX in colon cancer increased pathologic downstaging, and the PROSPECT trial which found that neoadjuvant therapy with 3 months of FOLFOX with sphincter-sparing surgery was noninferior to the usage of neoadjuvant chemo-radiation.  - recommended neoadjuvant chemotherapy with 6 cycles of FOLFOX with subsequent restaging scans; and at that time we will evaluate if she should proceed to surgery then  - plan for port placement, chemo ed    Iron deficiency anemia  - s/p IV iron dextran 1000mg. Recheck iron studies in ~1/2025    CAD  - s/p 3v CABG 6/2024  - on ASA 81mg  - Dr Saleem is her cardiologist    F/u: port, chemo ed, chemo    Homero Carvalho  Hematology/Medical Oncology  [facility]             [1] No Known Allergies

## 2024-11-07 ENCOUNTER — OFFICE VISIT (OUTPATIENT)
Dept: HEMATOLOGY/ONCOLOGY | Facility: HOSPITAL | Age: 69
End: 2024-11-07
Attending: STUDENT IN AN ORGANIZED HEALTH CARE EDUCATION/TRAINING PROGRAM
Payer: MEDICARE

## 2024-11-07 VITALS
OXYGEN SATURATION: 99 % | HEART RATE: 74 BPM | BODY MASS INDEX: 24.4 KG/M2 | SYSTOLIC BLOOD PRESSURE: 165 MMHG | TEMPERATURE: 98 F | WEIGHT: 161 LBS | HEIGHT: 67.99 IN | RESPIRATION RATE: 16 BRPM | DIASTOLIC BLOOD PRESSURE: 79 MMHG

## 2024-11-07 DIAGNOSIS — D50.0 IRON DEFICIENCY ANEMIA DUE TO CHRONIC BLOOD LOSS: ICD-10-CM

## 2024-11-07 DIAGNOSIS — C20 RECTAL ADENOCARCINOMA (HCC): Primary | ICD-10-CM

## 2024-11-07 DIAGNOSIS — C18.7 ADENOCARCINOMA OF SIGMOID COLON (HCC): ICD-10-CM

## 2024-11-07 PROCEDURE — 99215 OFFICE O/P EST HI 40 MIN: CPT | Performed by: INTERNAL MEDICINE

## 2024-11-07 PROCEDURE — G2211 COMPLEX E/M VISIT ADD ON: HCPCS | Performed by: INTERNAL MEDICINE

## 2024-11-07 RX ORDER — FLUOROURACIL 50 MG/ML
400 INJECTION, SOLUTION INTRAVENOUS ONCE
OUTPATIENT
Start: 2024-11-19

## 2024-11-07 RX ORDER — FLUOROURACIL 50 MG/ML
2400 INJECTION, SOLUTION INTRAVENOUS CONTINUOUS
OUTPATIENT
Start: 2024-11-19

## 2024-11-08 ENCOUNTER — CARDPULM VISIT (OUTPATIENT)
Dept: CARDIAC REHAB | Facility: HOSPITAL | Age: 69
End: 2024-11-08
Attending: INTERNAL MEDICINE
Payer: MEDICARE

## 2024-11-08 PROCEDURE — 93798 PHYS/QHP OP CAR RHAB W/ECG: CPT

## 2024-11-08 NOTE — PAT NURSING NOTE
PreOp Instructions: Have CBC drawn prior to procedure     You are scheduled for: an Interventional Radiology Procedure     Date of Procedure: 11/15/24. Check in at 9:00 am to the [facility] registration desk/[facility]     Diet Instructions: Do not eat or drink anything after midnight including gum, mints, candy, etc.     Medications: Medications you are allowed to take can be taken with a sip of water the morning of your procedure     Do not take the following Blood Thinner(s): stop aspirin 5 days prior to procedure     Diabetic Instructions: Do not take morning dose of your diabetic medications    Skin Prep : Shower with antibacterial soap using a clean washcloth, prior to procedure. Once dried off, no lotions/powders/creams/ointments, etc.     Driving After Procedure: Sedation will be given so you WILL NOT be able to drive home. You will need a responsible adult  to drive you home. You can NOT take uber or taxi unless approved by your physician in advance.     Discharge Teaching: Your nurse will give you specific instructions before discharge, Most people can resume normal activities in 2-3 days, Any questions, please call the physician's office

## 2024-11-11 ENCOUNTER — CARDPULM VISIT (OUTPATIENT)
Dept: CARDIAC REHAB | Facility: HOSPITAL | Age: 69
End: 2024-11-11
Attending: INTERNAL MEDICINE
Payer: MEDICARE

## 2024-11-11 PROCEDURE — 93798 PHYS/QHP OP CAR RHAB W/ECG: CPT

## 2024-11-13 ENCOUNTER — CARDPULM VISIT (OUTPATIENT)
Dept: CARDIAC REHAB | Facility: HOSPITAL | Age: 69
End: 2024-11-13
Attending: INTERNAL MEDICINE
Payer: MEDICARE

## 2024-11-13 ENCOUNTER — LAB ENCOUNTER (OUTPATIENT)
Dept: LAB | Facility: HOSPITAL | Age: 69
End: 2024-11-13
Attending: INTERNAL MEDICINE
Payer: MEDICARE

## 2024-11-13 ENCOUNTER — OFFICE VISIT (OUTPATIENT)
Dept: HEMATOLOGY/ONCOLOGY | Facility: HOSPITAL | Age: 69
End: 2024-11-13
Attending: STUDENT IN AN ORGANIZED HEALTH CARE EDUCATION/TRAINING PROGRAM
Payer: MEDICARE

## 2024-11-13 DIAGNOSIS — C18.7 ADENOCARCINOMA OF SIGMOID COLON (HCC): Primary | ICD-10-CM

## 2024-11-13 DIAGNOSIS — D50.0 IRON DEFICIENCY ANEMIA DUE TO CHRONIC BLOOD LOSS: ICD-10-CM

## 2024-11-13 DIAGNOSIS — C20 RECTAL ADENOCARCINOMA (HCC): ICD-10-CM

## 2024-11-13 DIAGNOSIS — Z71.89 OTHER SPECIFIED COUNSELING: ICD-10-CM

## 2024-11-13 LAB
BASOPHILS # BLD AUTO: 0.07 X10(3) UL (ref 0–0.2)
BASOPHILS NFR BLD AUTO: 1.1 %
DEPRECATED HBV CORE AB SER IA-ACNC: 16 NG/ML
EOSINOPHIL # BLD AUTO: 0.11 X10(3) UL (ref 0–0.7)
EOSINOPHIL NFR BLD AUTO: 1.7 %
ERYTHROCYTE [DISTWIDTH] IN BLOOD BY AUTOMATED COUNT: 25.9 %
HCT VFR BLD AUTO: 32.3 %
HGB BLD-MCNC: 10 G/DL
IMM GRANULOCYTES # BLD AUTO: 0.01 X10(3) UL (ref 0–1)
IMM GRANULOCYTES NFR BLD: 0.2 %
IRON SATN MFR SERPL: 9 %
IRON SERPL-MCNC: 40 UG/DL
LYMPHOCYTES # BLD AUTO: 1.23 X10(3) UL (ref 1–4)
LYMPHOCYTES NFR BLD AUTO: 19.6 %
MCH RBC QN AUTO: 25.5 PG (ref 26–34)
MCHC RBC AUTO-ENTMCNC: 31 G/DL (ref 31–37)
MCV RBC AUTO: 82.4 FL
MONOCYTES # BLD AUTO: 0.51 X10(3) UL (ref 0.1–1)
MONOCYTES NFR BLD AUTO: 8.1 %
NEUTROPHILS # BLD AUTO: 4.36 X10 (3) UL (ref 1.5–7.7)
NEUTROPHILS # BLD AUTO: 4.36 X10(3) UL (ref 1.5–7.7)
NEUTROPHILS NFR BLD AUTO: 69.3 %
PLATELET # BLD AUTO: 364 10(3)UL (ref 150–450)
PLATELET MORPHOLOGY: NORMAL
RBC # BLD AUTO: 3.92 X10(6)UL
TOTAL IRON BINDING CAPACITY: 440 UG/DL (ref 250–425)
TRANSFERRIN SERPL-MCNC: 358 MG/DL (ref 250–380)
WBC # BLD AUTO: 6.3 X10(3) UL (ref 4–11)

## 2024-11-13 PROCEDURE — 99215 OFFICE O/P EST HI 40 MIN: CPT | Performed by: CLINICAL NURSE SPECIALIST

## 2024-11-13 PROCEDURE — 82728 ASSAY OF FERRITIN: CPT

## 2024-11-13 PROCEDURE — 36415 COLL VENOUS BLD VENIPUNCTURE: CPT

## 2024-11-13 PROCEDURE — G2211 COMPLEX E/M VISIT ADD ON: HCPCS | Performed by: CLINICAL NURSE SPECIALIST

## 2024-11-13 PROCEDURE — 83550 IRON BINDING TEST: CPT

## 2024-11-13 PROCEDURE — 93798 PHYS/QHP OP CAR RHAB W/ECG: CPT

## 2024-11-13 PROCEDURE — 85025 COMPLETE CBC W/AUTO DIFF WBC: CPT

## 2024-11-13 PROCEDURE — G2212 PROLONG OUTPT/OFFICE VIS: HCPCS | Performed by: CLINICAL NURSE SPECIALIST

## 2024-11-13 PROCEDURE — 83540 ASSAY OF IRON: CPT

## 2024-11-13 RX ORDER — ONDANSETRON 8 MG/1
8 TABLET, FILM COATED ORAL EVERY 8 HOURS PRN
Qty: 30 TABLET | Refills: 3 | Status: SHIPPED | OUTPATIENT
Start: 2024-11-13

## 2024-11-13 RX ORDER — PROCHLORPERAZINE MALEATE 10 MG
10 TABLET ORAL EVERY 6 HOURS PRN
Qty: 30 TABLET | Refills: 3 | Status: SHIPPED | OUTPATIENT
Start: 2024-11-13

## 2024-11-13 NOTE — PROGRESS NOTES
IV Chemotherapy Education    Patient:Ilda Gutierrez     Date: 24   Diagnosis:  Colon/Rectal cancer   Support person(s) present: No    Drug names:  FOLFOX    Myelosuppression  Decrease in wbc  Decrease in hgb  Decrease in platelets  Risk of infection  Fatigue  Risk of bleeding  Notify MD/RN of any chills or fever 100.5and above:     Gastrointestinal Toxicities:  Stomatitis, sensitivity or oropharyngeal membranes, dry mouth, thrush  Appropriate oral hygiene  Changes in taste perception   Loss of appetite, possible weightloss  Nausea and vomiting   Use of anti-nausea medications  Diarrhea   Use of Imodium  Constipation  Use of various laxatives      Treatment Effects on Hair:  Alopecia or thinning      Neurotoxicity:  Peripheral neuropathy  Cold sensitivity    Hepatotoxicity  Rise in LFTs    Nephrotoxicity  Rise in serum creatinine  Dehydration  Electrolyte abnormalities           Treatment Effects on Reproductive System:    Avoid pregnancy / use of barrier birth control methods:    Possible sterility, impotence, changes in sex drive:      Teaching Materials Provided:      Chemotherapy information sheets  ACS Understanding Chemotherapy Booklet  ACS Nutrition for the Person with Cancer during Treatment / Dietician information sheet  When to contact the Treatment Team Information Sheet  Side Effect Management Information Sheet  [facility] Support Services Sheet  National Resources Information sheet    Patient and caregiver were given ample opportunity to ask questions.  All questions and concerns addressed.  We discussed self care techniques, symptom management, fluid, diet, and activities.      Chemotherapy Consent Form signed by the patient.  I spent  60 minutes counseling patient regarding the above documented side effects and management, when to call provider and contact information.   Encounter Times  PreChartin minutes    Reviewing/Obtaining:   minutes      Medical Exam:   minutes    Plan:   minutes       Notes: 5 minutes    Counseling/Education: 60 minutes      Referring/Communicating:   minutes    Ind Interpretation:   minutes      Care Coordination:   minutes       My total time spent caring for the patient on the day of the encounter: 70 minutes.         Patient will continue to receive longitudinal care at the [facility] for the complex care required for the cancer diagnosis including the expected complications related to anticancer therapy.    Sanjana BOGGS  Nurse Practitioner  [facility]  [facility]

## 2024-11-14 ENCOUNTER — DOCUMENTATION ONLY (OUTPATIENT)
Dept: HEMATOLOGY/ONCOLOGY | Facility: HOSPITAL | Age: 69
End: 2024-11-14

## 2024-11-15 ENCOUNTER — HOSPITAL ENCOUNTER (OUTPATIENT)
Dept: INTERVENTIONAL RADIOLOGY/VASCULAR | Facility: HOSPITAL | Age: 69
Discharge: HOME | End: 2024-11-15
Attending: INTERNAL MEDICINE | Admitting: INTERNAL MEDICINE
Payer: MEDICARE

## 2024-11-15 ENCOUNTER — APPOINTMENT (OUTPATIENT)
Dept: CARDIAC REHAB | Facility: HOSPITAL | Age: 69
End: 2024-11-15
Attending: INTERNAL MEDICINE
Payer: MEDICARE

## 2024-11-15 VITALS
SYSTOLIC BLOOD PRESSURE: 125 MMHG | RESPIRATION RATE: 11 BRPM | OXYGEN SATURATION: 95 % | HEART RATE: 58 BPM | DIASTOLIC BLOOD PRESSURE: 72 MMHG | TEMPERATURE: 99 F

## 2024-11-15 DIAGNOSIS — C20 RECTAL ADENOCARCINOMA (HCC): Primary | ICD-10-CM

## 2024-11-15 DIAGNOSIS — C18.7 ADENOCARCINOMA OF SIGMOID COLON (HCC): ICD-10-CM

## 2024-11-15 LAB
GLUCOSE BLD-MCNC: 124 MG/DL (ref 70–99)
INR: 0.09 (ref 0.8–1.3)

## 2024-11-15 PROCEDURE — 85610 PROTHROMBIN TIME: CPT | Performed by: STUDENT IN AN ORGANIZED HEALTH CARE EDUCATION/TRAINING PROGRAM

## 2024-11-15 PROCEDURE — 99152 MOD SED SAME PHYS/QHP 5/>YRS: CPT | Performed by: STUDENT IN AN ORGANIZED HEALTH CARE EDUCATION/TRAINING PROGRAM

## 2024-11-15 PROCEDURE — 0JH60WZ INSERTION OF TOTALLY IMPLANTABLE VASCULAR ACCESS DEVICE INTO CHEST SUBCUTANEOUS TISSUE AND FASCIA, OPEN APPROACH: ICD-10-PCS | Performed by: STUDENT IN AN ORGANIZED HEALTH CARE EDUCATION/TRAINING PROGRAM

## 2024-11-15 PROCEDURE — B518ZZA FLUOROSCOPY OF SUPERIOR VENA CAVA, GUIDANCE: ICD-10-PCS | Performed by: STUDENT IN AN ORGANIZED HEALTH CARE EDUCATION/TRAINING PROGRAM

## 2024-11-15 PROCEDURE — 82962 GLUCOSE BLOOD TEST: CPT

## 2024-11-15 PROCEDURE — 02HV33Z INSERTION OF INFUSION DEVICE INTO SUPERIOR VENA CAVA, PERCUTANEOUS APPROACH: ICD-10-PCS | Performed by: STUDENT IN AN ORGANIZED HEALTH CARE EDUCATION/TRAINING PROGRAM

## 2024-11-15 PROCEDURE — 77001 FLUOROGUIDE FOR VEIN DEVICE: CPT | Performed by: STUDENT IN AN ORGANIZED HEALTH CARE EDUCATION/TRAINING PROGRAM

## 2024-11-15 PROCEDURE — 36561 INSERT TUNNELED CV CATH: CPT | Performed by: STUDENT IN AN ORGANIZED HEALTH CARE EDUCATION/TRAINING PROGRAM

## 2024-11-15 PROCEDURE — B543ZZA ULTRASONOGRAPHY OF RIGHT JUGULAR VEINS, GUIDANCE: ICD-10-PCS | Performed by: STUDENT IN AN ORGANIZED HEALTH CARE EDUCATION/TRAINING PROGRAM

## 2024-11-15 PROCEDURE — 76937 US GUIDE VASCULAR ACCESS: CPT | Performed by: STUDENT IN AN ORGANIZED HEALTH CARE EDUCATION/TRAINING PROGRAM

## 2024-11-15 RX ORDER — MIDAZOLAM HYDROCHLORIDE 1 MG/ML
INJECTION INTRAMUSCULAR; INTRAVENOUS
Status: COMPLETED
Start: 2024-11-15 | End: 2024-11-15

## 2024-11-15 RX ORDER — HEPARIN SODIUM 5000 [USP'U]/ML
INJECTION, SOLUTION INTRAVENOUS; SUBCUTANEOUS
Status: COMPLETED
Start: 2024-11-15 | End: 2024-11-15

## 2024-11-15 RX ORDER — LIDOCAINE HYDROCHLORIDE AND EPINEPHRINE BITARTRATE 20; .01 MG/ML; MG/ML
INJECTION, SOLUTION SUBCUTANEOUS
Status: COMPLETED
Start: 2024-11-15 | End: 2024-11-15

## 2024-11-15 RX ORDER — LIDOCAINE HYDROCHLORIDE 10 MG/ML
INJECTION, SOLUTION INFILTRATION; PERINEURAL
Status: COMPLETED
Start: 2024-11-15 | End: 2024-11-15

## 2024-11-15 RX ORDER — SODIUM CHLORIDE 9 MG/ML
INJECTION, SOLUTION INTRAVENOUS CONTINUOUS
Status: DISCONTINUED | OUTPATIENT
Start: 2024-11-15 | End: 2024-11-15

## 2024-11-15 NOTE — DISCHARGE INSTRUCTIONS
~ Follow up with MDs as scheduled.   ~ Rest today and resume regular activity in the am as tolerated.   ~ No driving or drinking alcohol for 24hrs.   ~ Resume diet and medications.   ~ No lifting more than 10 pounds for 48hrs. Avoid lifting heavy objects such as a purse or a backpack from the shoulder of which the port was placed for 48hrs.   ~ Avoid strenuous activity with the arm of which th port was placed for 48hrs.   ~ Keep bandage completely dry and intact. Small white bandage can be removed tomorrow. Leave large brown bandage on and cancer nurses will remove that on your appointment and give further bathing instructions.   ~ Notify MD if any signs of infection... Fever, chills, redness, swelling or drainage.   ~ It is normal to have some discomfort at the incisions ite for the first 24-48hrs. You make take over the counter Tylenol if needed.   ~ Keep port card in your wallet.

## 2024-11-15 NOTE — INTERVAL H&P NOTE
The above referenced H&P was reviewed by Jw Bustos MD on 11/15/2024, the patient was examined and no significant changes have occurred in the patient's condition since the H&P was performed.  Risks, benefits, alternative treatments and consequences of no treatment were discussed.  We will proceed with procedure as planned.  Mallampati: 2  Cardiac: Regular rate  Respiratory: Non-labored  ASA: 2      Jw Bustos MD  11/15/2024  12:30 PM

## 2024-11-15 NOTE — IVS NOTE
Patient discharged home post port insertion. VSS. PO intake tolerated. After recovery, AVS reviewed. PIV removed.Dressing to right side of chest clean, dry and intact. Discharged via wheelchair with all belongings.

## 2024-11-15 NOTE — PROCEDURES
[facility]   part of [facility]  Procedure Note    Ilda Gutierrez Patient Status:  Outpatient    1955 MRN [id number]   Location [facility] INTERVENTIONAL SUITES Attending Homero Carvalho MD   Hosp Day # 0 PCP Kasey Mendenhall MD     Procedure: Port catheter placement    Pre-Procedure Diagnosis:  Rectal ca    Post-Procedure Diagnosis: Same    Anesthesia:  Local and Sedation    Findings:  Patent right internal jugular vein.  Successful placement of right chest port catheter via right internal jugular vein.  Port aspirates and flushes freely. Locked with Heparin.    Port is ready for use.      Specimens: None    Blood Loss:  <5ml    Tourniquet Time: 0  Complications:  None  Drains:  None    Secondary Diagnosis:  None      Jw Bustos MD  11/15/2024

## 2024-11-18 ENCOUNTER — APPOINTMENT (OUTPATIENT)
Dept: HEMATOLOGY/ONCOLOGY | Facility: HOSPITAL | Age: 69
End: 2024-11-18
Attending: STUDENT IN AN ORGANIZED HEALTH CARE EDUCATION/TRAINING PROGRAM
Payer: MEDICARE

## 2024-11-18 ENCOUNTER — CARDPULM VISIT (OUTPATIENT)
Dept: CARDIAC REHAB | Facility: HOSPITAL | Age: 69
End: 2024-11-18
Attending: INTERNAL MEDICINE
Payer: MEDICARE

## 2024-11-18 PROCEDURE — 93798 PHYS/QHP OP CAR RHAB W/ECG: CPT

## 2024-11-19 ENCOUNTER — OFFICE VISIT (OUTPATIENT)
Dept: HEMATOLOGY/ONCOLOGY | Facility: HOSPITAL | Age: 69
End: 2024-11-19
Attending: STUDENT IN AN ORGANIZED HEALTH CARE EDUCATION/TRAINING PROGRAM
Payer: MEDICARE

## 2024-11-19 ENCOUNTER — SOCIAL WORK SERVICES (OUTPATIENT)
Dept: HEMATOLOGY/ONCOLOGY | Facility: HOSPITAL | Age: 69
End: 2024-11-19

## 2024-11-19 VITALS
BODY MASS INDEX: 26.66 KG/M2 | OXYGEN SATURATION: 98 % | TEMPERATURE: 98 F | HEIGHT: 66.89 IN | SYSTOLIC BLOOD PRESSURE: 137 MMHG | HEART RATE: 90 BPM | WEIGHT: 169.88 LBS | DIASTOLIC BLOOD PRESSURE: 65 MMHG

## 2024-11-19 DIAGNOSIS — D50.0 IRON DEFICIENCY ANEMIA DUE TO CHRONIC BLOOD LOSS: ICD-10-CM

## 2024-11-19 DIAGNOSIS — C18.7 ADENOCARCINOMA OF SIGMOID COLON (HCC): Primary | ICD-10-CM

## 2024-11-19 DIAGNOSIS — C20 RECTAL ADENOCARCINOMA (HCC): ICD-10-CM

## 2024-11-19 LAB
ALBUMIN SERPL-MCNC: 4.5 G/DL (ref 3.2–4.8)
ALBUMIN/GLOB SERPL: 2 {RATIO} (ref 1–2)
ALP LIVER SERPL-CCNC: 79 U/L
ALT SERPL-CCNC: 19 U/L
ANION GAP SERPL CALC-SCNC: 7 MMOL/L (ref 0–18)
AST SERPL-CCNC: 22 U/L (ref ?–34)
BASOPHILS # BLD AUTO: 0.05 X10(3) UL (ref 0–0.2)
BASOPHILS NFR BLD AUTO: 1 %
BILIRUB SERPL-MCNC: 0.4 MG/DL (ref 0.2–1.1)
BUN BLD-MCNC: 18 MG/DL (ref 9–23)
CALCIUM BLD-MCNC: 9.6 MG/DL (ref 8.7–10.4)
CEA SERPL-MCNC: 3.1 NG/ML (ref ?–5)
CHLORIDE SERPL-SCNC: 110 MMOL/L (ref 98–112)
CO2 SERPL-SCNC: 24 MMOL/L (ref 21–32)
CREAT BLD-MCNC: 0.78 MG/DL
EGFRCR SERPLBLD CKD-EPI 2021: 82 ML/MIN/1.73M2 (ref 60–?)
EOSINOPHIL # BLD AUTO: 0.15 X10(3) UL (ref 0–0.7)
EOSINOPHIL NFR BLD AUTO: 3 %
ERYTHROCYTE [DISTWIDTH] IN BLOOD BY AUTOMATED COUNT: 23.9 %
GLOBULIN PLAS-MCNC: 2.3 G/DL (ref 2–3.5)
GLUCOSE BLD-MCNC: 113 MG/DL (ref 70–99)
HCT VFR BLD AUTO: 31.5 %
HGB BLD-MCNC: 9.8 G/DL
IMM GRANULOCYTES # BLD AUTO: 0.01 X10(3) UL (ref 0–1)
IMM GRANULOCYTES NFR BLD: 0.2 %
LYMPHOCYTES # BLD AUTO: 1.08 X10(3) UL (ref 1–4)
LYMPHOCYTES NFR BLD AUTO: 21.7 %
MCH RBC QN AUTO: 25.4 PG (ref 26–34)
MCHC RBC AUTO-ENTMCNC: 31.1 G/DL (ref 31–37)
MCV RBC AUTO: 81.6 FL
MONOCYTES # BLD AUTO: 0.44 X10(3) UL (ref 0.1–1)
MONOCYTES NFR BLD AUTO: 8.8 %
NEUTROPHILS # BLD AUTO: 3.25 X10 (3) UL (ref 1.5–7.7)
NEUTROPHILS # BLD AUTO: 3.25 X10(3) UL (ref 1.5–7.7)
NEUTROPHILS NFR BLD AUTO: 65.3 %
OSMOLALITY SERPL CALC.SUM OF ELEC: 295 MOSM/KG (ref 275–295)
PLATELET # BLD AUTO: 284 10(3)UL (ref 150–450)
PLATELET MORPHOLOGY: NORMAL
POTASSIUM SERPL-SCNC: 4.3 MMOL/L (ref 3.5–5.1)
PROT SERPL-MCNC: 6.8 G/DL (ref 5.7–8.2)
RBC # BLD AUTO: 3.86 X10(6)UL
SODIUM SERPL-SCNC: 141 MMOL/L (ref 136–145)
WBC # BLD AUTO: 5 X10(3) UL (ref 4–11)

## 2024-11-19 PROCEDURE — 82378 CARCINOEMBRYONIC ANTIGEN: CPT

## 2024-11-19 PROCEDURE — 96375 TX/PRO/DX INJ NEW DRUG ADDON: CPT

## 2024-11-19 PROCEDURE — 96368 THER/DIAG CONCURRENT INF: CPT

## 2024-11-19 PROCEDURE — 80053 COMPREHEN METABOLIC PANEL: CPT

## 2024-11-19 PROCEDURE — 96367 TX/PROPH/DG ADDL SEQ IV INF: CPT

## 2024-11-19 PROCEDURE — 96413 CHEMO IV INFUSION 1 HR: CPT

## 2024-11-19 PROCEDURE — 96411 CHEMO IV PUSH ADDL DRUG: CPT

## 2024-11-19 PROCEDURE — G2211 COMPLEX E/M VISIT ADD ON: HCPCS | Performed by: INTERNAL MEDICINE

## 2024-11-19 PROCEDURE — 96415 CHEMO IV INFUSION ADDL HR: CPT

## 2024-11-19 PROCEDURE — 85025 COMPLETE CBC W/AUTO DIFF WBC: CPT

## 2024-11-19 PROCEDURE — 99215 OFFICE O/P EST HI 40 MIN: CPT | Performed by: INTERNAL MEDICINE

## 2024-11-19 RX ORDER — FLUOROURACIL 50 MG/ML
400 INJECTION, SOLUTION INTRAVENOUS ONCE
Status: COMPLETED | OUTPATIENT
Start: 2024-11-19 | End: 2024-11-19

## 2024-11-19 RX ORDER — FLUOROURACIL 50 MG/ML
2400 INJECTION, SOLUTION INTRAVENOUS CONTINUOUS
Status: DISCONTINUED | OUTPATIENT
Start: 2024-11-19 | End: 2024-11-19

## 2024-11-19 RX ADMIN — FLUOROURACIL 750 MG: 50 INJECTION, SOLUTION INTRAVENOUS at 13:59:00

## 2024-11-19 RX ADMIN — FLUOROURACIL 4500 MG: 50 INJECTION, SOLUTION INTRAVENOUS at 13:59:00

## 2024-11-19 NOTE — PATIENT INSTRUCTIONS
Anti-Nausea Medication:    Ondansetron (Zofran) -- can take every 8 hours as needed for nausea.    Prochlorperazine (Compazine) -- can take every 6 hours as needed for nausea.       IV Zofran given through your port today at 11:30     Oral Zofran - you can take as early as 7:30pm    Oral Compazine - you can take this any time tonight      We recommend alternating every 4 hours. EX: Zofran 8am, compazine 12pm, zofran 4pm, compazine 8pm, etc...     You do not need to wake yourself up to take anything. Start up again in the morning. Alternate every 4 hours like this for next 2-3 days.      Zofran may cause headaches and/or constipation.     You may use laxatives/stool softeners (miralax, etc) and tylenol to help with this (try to avoid advil/motrin/ibuprofen).

## 2024-11-19 NOTE — PROGRESS NOTES
met with patient and Daughter Cindy in Treatment room for introduction and role explanation. No Distress Screen on file. Patient shared that she is “scared” to start treatment. She shared that she had cardiac issues a few months back, and now this, so is feeling overwhelmed as it seems like she is getting hit with everything one after another. Support and encouragement provided.     Patient lives in [place], [place] with  Khalif. She shared that she has 4 Adult Children, all I the area: Cindy, Js, Jesus Manuel, and Chel. She has 9 Grandchildren ages 6-28, and 6 great grandchildren; 6mo triplets, 2, 4, and 5yo. The family is very involved with one another, and patient appears to be a central piece to the family. Someone is always over at her house or with her, and she is a caregiver to others in the family. Discussed putting herself first, even when adult children are relying on her for childcare or meals. She agreed and states that she escapes to her Daughter Cindy's house on occasion. Patient joked that she will be too busy with the holidays coming up to worry about treatment; she plans to continue holiday traditions of baking, tony house making, and a grand [place] [employer] Meal for over 20 family members.      educated on FMLA/STD benefits, encouraging patient/family to confirm benefits with employer if needed. Provided Forms Department flyer as well for any paperwork that may be required.      educated on Power of  for Healthcare; Patient stated that one has already been completed and will submit to Cancer Center to keep on file.     Educated on available resources, providing Social Work Support Packet including Cancer Center Support Services, [facility], Transportation, and Home Care contacts. Educated on BHI if desired. Contact provided and family is aware to reach out with any needs. Bringing Lexi Bag given, which patient was  grateful for.

## 2024-11-19 NOTE — PROGRESS NOTES
Hematology/Oncology Clinic Follow Up Visit    Patient Name: Ilda Gutierrez  Medical Record Number: [id number]    YOB: 1955   PCP: Kasey Mendenhall MD    Reason for Consultation:  Ilda Gutierrez was seen today for the diagnosis of synchronous sigmoid and rectal adenocarcinoma    Oncologic History:  9/30/24: visit noted to have blood in stool, iron deficiency anemia  10/29/24: s/p colonoscopy with sigmoid colon mass (at 21cm) and rectal mass (at 7-8cm) with path for both positive for moderately differentiated adenocarcinoma. RENÉ.  10/31/24: Rectal MRI reviewed at rectal tumor board; circumferential mass of upper to mid rectum measuring 6cm. T3bN0. Sigmoid mass adjacent to rectal mass. Separate surgeries cannot be performed.  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    - here to start C1D1 FOLFOX  - here with daughter  - still having intermittent GI bleeding, but not worse than usual    Past Medical History:  Past Medical History:    Coronary atherosclerosis    Diabetes (HCC)    High blood pressure    High cholesterol    Hyperlipidemia     Past Surgical History:   Procedure Laterality Date    Cabg  2024    [facility] , Triple bypass    Colonoscopy N/A 10/29/2024    Dr. Anderson; polyps, sigmoid mass, rectal mass, diverticulosis    Colonoscopy N/A 10/29/2024    Procedure: COLONOSCOPY;  Surgeon: Toni Anderson MD;  Location: [facility] ENDOSCOPY    Egd N/A 10/29/2024    Dr. Anderson; hiatal hernia    Spine surgery procedure unlisted         Home Medications:  No outpatient medications have been marked as taking for the 11/19/24 encounter (Office Visit) with Homero Carvalho MD.       Allergies:   Allergies[1]    Psychosocial History:  Social History     Socioeconomic History    Marital status:      Spouse name: Not on file    Number of children:  Not on file    Years of education: Not on file    Highest education level: Not on file   Occupational History    Not on file   Tobacco Use    Smoking status: Never    Smokeless tobacco: Never   Vaping Use    Vaping status: Never Used   Substance and Sexual Activity    Alcohol use: Yes     Comment: OCCASIONAL    Drug use: Never    Sexual activity: Not on file   Other Topics Concern    Not on file   Social History Narrative    Not on file     Social Drivers of Health     Financial Resource Strain: Not on file   Food Insecurity: No Food Insecurity (6/11/2024)    Food Insecurity     Food Insecurity: Never true   Transportation Needs: No Transportation Needs (6/11/2024)    Transportation Needs     Lack of Transportation: No     Car Seat: Not on file   Physical Activity: Not on file   Stress: Not on file   Social Connections: Not on file   Housing Stability: Low Risk  (6/11/2024)    Housing Stability     Housing Instability: No     Housing Instability Emergency: Not on file     Crib or Bassinette: Not on file       Family Medical History:  Family History   Problem Relation Age of Onset    Breast Cancer Mother 70       Review of Systems:  A 10-point ROS was done with pertinent positives and negative per the HPI    Vital Signs:  Height: 169.9 cm (5' 6.89\") (11/19 0857)  Weight: 77.1 kg (169 lb 14.4 oz) (11/19 0857)  BSA (Calculated - sq m): 1.88 sq meters (11/19 0857)  Pulse: 90 (11/19 0857)  BP: 137/65 (11/19 0857)  Temp: 98 °F (36.7 °C) (11/19 0857)  Do Not Use - Resp Rate: --  SpO2: 98 % (11/19 0857)    Wt Readings from Last 6 Encounters:   11/19/24 77.1 kg (169 lb 14.4 oz)   11/07/24 73 kg (161 lb)   10/25/24 71.2 kg (157 lb)   10/04/24 73.7 kg (162 lb 8 oz)   09/30/24 73.6 kg (162 lb 3.2 oz)   09/26/24 73.1 kg (161 lb 1.6 oz)       ECOG PS: 1    Physical Examination:  General: Patient is alert and oriented, not in acute distress  Psych: Mood and affect are appropriate  Eyes: EOMI, PERRL  ENT: Oropharynx is clear, no  adenopathy  CV: no LE edema  Respiratory: non-labored respirations  GI/Abd: Soft, non-tender   Neurological: Grossly intact   Lymphatics: No palpable inguinal lymphadenopathy  Skin: no rashes or petechiae    Laboratory:  Lab Results   Component Value Date    WBC 5.0 11/19/2024    WBC 6.3 11/13/2024    WBC 5.9 09/11/2024    HGB 9.8 (L) 11/19/2024    HGB 10.0 (L) 11/13/2024    HGB 8.6 (L) 10/29/2024    HCT 31.5 (L) 11/19/2024    MCV 81.6 11/19/2024    MCH 25.4 (L) 11/19/2024    MCHC 31.1 11/19/2024    RDW 23.9 11/19/2024    .0 11/19/2024    .0 11/13/2024    .0 09/11/2024     Lab Results   Component Value Date     (H) 11/19/2024    BUN 18 11/19/2024    CREATSERUM 0.78 11/19/2024    CREATSERUM 0.81 09/11/2024    CREATSERUM 0.72 06/15/2024    ANIONGAP 7 11/19/2024    GFR 94 08/23/2017    CA 9.6 11/19/2024    OSMOCALC 295 11/19/2024    ALKPHO 79 11/19/2024    AST 22 11/19/2024    ALT 19 11/19/2024    BILT 0.4 11/19/2024    TP 6.8 11/19/2024    ALB 4.5 11/19/2024    GLOBULIN 2.3 11/19/2024     11/19/2024    K 4.3 11/19/2024     11/19/2024    CO2 24.0 11/19/2024     Lab Results   Component Value Date    PTT 23.7 06/11/2024    INR 0.09 (A) 11/15/2024     Impression & Plan:     Synchronous rectal and sigmoid colon adenocarcinoma  - T3bN0 on rectal MRI for rectal cancer, with adjacent sigmoid colon mass. Both adenocarcinoma, RENÉ  - reviewed at rectal tumor board; due to close proximity, separate surgeries cannot be performed  - we discussed that under ordinary circumstances, her rectal cancer in isolation would be treated with total neoadjuvant therapy with neoadjuvant chemo-radiation followed by chemotherapy with a chance of not needing surgery. Her colon cancer in isolation would be treated with upfront resection followed by possible chemotherapy. However given that she has synchronous colon and rectal primaries, I recommended neoadjuvant FOLFOX which is highly active against both  colon and rectal adenocarcinoma. This is supported by the FOXTROT study which found that 6 weeks of neoadjuvant FOLFOX in colon cancer increased pathologic downstaging, and the PROSPECT trial which found that neoadjuvant therapy with 3 months of FOLFOX with sphincter-sparing surgery was noninferior to the usage of neoadjuvant chemo-radiation.  - recommended neoadjuvant chemotherapy with 6 cycles of FOLFOX with subsequent restaging scans; and at that time we will evaluate if she should proceed to surgery then  - C1D1 FOLFOX today    Iron deficiency anemia  - s/p IV iron dextran 1000mg. Recheck iron studies in ~1/2025    CAD  - s/p 3v CABG 6/2024  - on ASA 81mg  - Dr Saleem is her cardiologist    F/u: D8, C2    Homero Carvalho  Hematology/Medical Oncology  [facility]             [1] No Known Allergies

## 2024-11-19 NOTE — PROGRESS NOTES
Education Record    Learner:  Patient and Family Member    Disease / Diagnosis: Sigmoid and rectal     Barriers / Limitations:  None   Comments:    Method:  Discussion   Comments:    General Topics:  Medication and Plan of care reviewed   Comments:    Outcome:  Shows understanding   Comments:    Here for C1D1 FOLFOX.

## 2024-11-19 NOTE — PROGRESS NOTES
Pt here for C1D1 Drug(s)Folfox and infed.  Arrives Ambulating independently, accompanied by Family member     Patient was evaluated today by MD.    Oral medications included in this regimen:  no    Patient confirms comprehension of cancer treatment schedule:  yes    Pregnancy screening:  Denies possibility of pregnancy    Modifications in dose or schedule:  No    Medications appearance and physical integrity checked by RN: yes.    Chemotherapy IV pump settings verified by 2 RNs:  Yes.  Frequency of blood return and site check throughout administration: Prior to administration, Every 2-3 ml IVP, and At completion of therapy     Infusion/treatment outcome:  patient tolerated treatment without incident    Education Record    Learner:  Patient  Barriers / Limitations:  None  Method:  Discussion  Education / instructions given:  plan of care  Outcome:  Shows understanding    Discharged Home, Ambulating independently, accompanied by:Family member    Patient/family verbalized understanding of future appointments: by printed AVS    Patient here for labs, MD, and first treatment. Patient tolerated well. Pt watched educational CADD pump and all questions answered. Patient left in stable condition with CADD pump infusing. Future appointments in place.

## 2024-11-20 ENCOUNTER — APPOINTMENT (OUTPATIENT)
Dept: CARDIAC REHAB | Facility: HOSPITAL | Age: 69
End: 2024-11-20
Attending: INTERNAL MEDICINE
Payer: MEDICARE

## 2024-11-20 ENCOUNTER — TELEPHONE (OUTPATIENT)
Dept: HEMATOLOGY/ONCOLOGY | Facility: HOSPITAL | Age: 69
End: 2024-11-20

## 2024-11-20 NOTE — PROGRESS NOTES
Oncology Nutrition Consultation    Patient Name: Ilda Gutierrez  YOB: 1955  Medical Record Number: [id number]   Account Number: [id number]  Dietitian: Maria E Blevins RD, LDN    Date of visit: 11/19/2024    Diet Rx: high protein/quality as tolerated; low residue pending diarrhea    Pertinent Dx/PMH: synchronous sigmoid and rectal adenocarcinoma     Past Medical History:    Coronary atherosclerosis    Diabetes (HCC)    High blood pressure    High cholesterol    Hyperlipidemia       TX: FOLFOX    Other pertinent subjective/objective information: diet/medical hx    Pertinent Meds:    Current Outpatient Medications:     prochlorperazine (COMPAZINE) 10 mg tablet, Take 1 tablet (10 mg total) by mouth every 6 (six) hours as needed for Nausea., Disp: 30 tablet, Rfl: 3    ondansetron (ZOFRAN) 8 MG tablet, Take 1 tablet (8 mg total) by mouth every 8 (eight) hours as needed for Nausea., Disp: 30 tablet, Rfl: 3    metFORMIN  MG Oral Tablet 24 Hr, Take 1 tablet (500 mg total) by mouth 2 (two) times daily with meals., Disp: 180 tablet, Rfl: 1    metoprolol tartrate 50 MG Oral Tab, Take 1 tablet (50 mg total) by mouth 2x Daily(Beta Blocker)., Disp: 60 tablet, Rfl: 3    aspirin 81 MG Oral Tab EC, Take 1 tablet (81 mg total) by mouth at bedtime., Disp: , Rfl:     rosuvastatin 10 MG Oral Tab, Take 1 tablet (10 mg total) by mouth nightly., Disp: , Rfl:     Pertinent Labs: toed    Height:  5'7\"          IBW: 135  +/- 10%    WT HX:   Wt Readings from Last 9 Encounters:   11/19/24 77.1 kg (169 lb 14.4 oz)   11/07/24 73 kg (161 lb)   10/25/24 71.2 kg (157 lb)   10/04/24 73.7 kg (162 lb 8 oz)   09/30/24 73.6 kg (162 lb 3.2 oz)   09/26/24 73.1 kg (161 lb 1.6 oz)   09/12/24 72.2 kg (159 lb 3.2 oz)   06/21/24 75.5 kg (166 lb 6.4 oz)   06/15/24 78.2 kg (172 lb 6.4 oz)       Estimated Nutrition Needs: 23-27 kcals/kg = 7744-9284 KCALS/d; 1.4 gms protein/kg = 108 gms/d    Services Provided: Verbal and written ix provided  addressing -  importance of nutrition during tx; potential nutrition related side effects of tx and ways to address    Assessment/Plan: RD met w/ this pleasant, soft-spoken, 68 y/o female in tx room for introduction, assessment and recommendation at her chemotherapy start. Pt noted s/p CABG 6/2024 and finishing-up w/ cardiac rehab which she noted enjoying.     Diet hx revealed 1/2 bagel w/ peanut butter and banana or egg/toast or sour dough/cream cheese/tomato and tea/coffee for breakfast; +/- apple w/ peanut butter for snack or dry low sugar cereal or grapes/cheese/crackers for snack; salad w/ tuna and avocado for dinner and sugar-free beverage.     RD reviewed recommendations as noted encouraging pt consume a protein source tid. Sample of [place] was provided as pt noted not having breakfast this morning. RD encouraged having a light breakfast on chemo days.     Pt actively participated verbalizing understanding of recommendations made. RD offered support and will continue to follow throughout tx.       Thank you for allowing me to participate in the care of Ilda.     The 21st Century Cures Act makes medical notes like these available to patients in the interest of transparency. Please be advised this is a medical document. Medical documents are intended to carry relevant information, facts as evident, and the clinical opinion of the practitioner. The medical note is intended as peer to peer communication and may appear blunt or direct. It is written in medical language and may contain abbreviations or verbiage that are unfamiliar.

## 2024-11-20 NOTE — TELEPHONE ENCOUNTER
Patient is returning call to discuss first treatment. Please call patient back at [phone]. Called 11/20/24 GA

## 2024-11-20 NOTE — TELEPHONE ENCOUNTER
Ilda said she feels a \"little tired, but good.\" She did not sleep well last night. No other side effects at this time. I explained that we gave her steroids before her chemotherapy yesterday. They take about 24-48 hrs to leave the body. Her sleep will return to normal at that time. I encouraged her to please call the office if she is not feeling well or she has any questions or concerns. She agreed and thanked me for checking on her.

## 2024-11-20 NOTE — TELEPHONE ENCOUNTER
Toxicities: C1 D1 Fluorouracil/Leucovorin/Oxaliplatin on 11/19/2024    I attempted to reach Ilda to see how she is feeling after her first treatment. I left a voice mail message asking her to please return my call at her earliest convenience.

## 2024-11-21 ENCOUNTER — NURSE ONLY (OUTPATIENT)
Dept: HEMATOLOGY/ONCOLOGY | Facility: HOSPITAL | Age: 69
End: 2024-11-21
Attending: STUDENT IN AN ORGANIZED HEALTH CARE EDUCATION/TRAINING PROGRAM
Payer: MEDICARE

## 2024-11-21 PROCEDURE — 96523 IRRIG DRUG DELIVERY DEVICE: CPT

## 2024-11-21 NOTE — PROGRESS NOTES
Education Record    Learner:  Patient    Disease / Diagnosis: pump d'c    Barriers / Limitations:  None   Comments:    Method:  Brief focused   Comments:    General Topics:  Side effects and symptom management   Comments:    Outcome:  Shows understanding   Comments:

## 2024-11-22 ENCOUNTER — CARDPULM VISIT (OUTPATIENT)
Dept: CARDIAC REHAB | Facility: HOSPITAL | Age: 69
End: 2024-11-22
Attending: INTERNAL MEDICINE
Payer: MEDICARE

## 2024-11-22 PROCEDURE — 93798 PHYS/QHP OP CAR RHAB W/ECG: CPT

## 2024-11-25 ENCOUNTER — CARDPULM VISIT (OUTPATIENT)
Dept: CARDIAC REHAB | Facility: HOSPITAL | Age: 69
End: 2024-11-25
Attending: INTERNAL MEDICINE
Payer: MEDICARE

## 2024-11-25 PROCEDURE — 93798 PHYS/QHP OP CAR RHAB W/ECG: CPT

## 2024-11-25 NOTE — PROGRESS NOTES
Cancer Center Progress Note    Patient Name: Ilda Gutierrez   YOB: 1955   Medical Record Number: [id number]   CSN: [id number]   Date of visit: 11/25/2024  Attending Oncology Physician:  Homero Carvalho    NOTE TO PATIENT:  The 21st Century Cures Act makes medical notes like these available to patients in the interest of transparency. Please be advised this is a medical document. Medical documents are intended to carry relevant information, facts as evident, and the clinical opinion of the practitioner. The medical note is intended as peer to peer communication and may appear blunt or direct. It is written in medical language and may contain abbreviations or verbiage that are unfamiliar.      Chief Complaint:  Follow up       Oncologic History:  9/30/24: visit noted to have blood in stool, iron deficiency anemia  10/29/24: s/p colonoscopy with sigmoid colon mass (at 21cm) and rectal mass (at 7-8cm) with path for both positive for moderately differentiated adenocarcinoma. RENÉ.  10/31/24: Rectal MRI reviewed at rectal tumor board; circumferential mass of upper to mid rectum measuring 6cm. T3bN0. Sigmoid mass adjacent to rectal mass. Separate surgeries cannot be performed.  11/19/24: C1D1 FOLFOX    Interval Events:  69 year old  patient of Dr. Carvalho's  who presents for follow up visit after initial FOLFOX chemotherapy.      Allergies:  Allergies[1]    Social History     Socioeconomic History    Marital status:    Tobacco Use    Smoking status: Never    Smokeless tobacco: Never   Vaping Use    Vaping status: Never Used   Substance and Sexual Activity    Alcohol use: Yes     Comment: OCCASIONAL    Drug use: Never        Past Medical History:    Coronary atherosclerosis    Diabetes (HCC)    High blood pressure    High cholesterol    Hyperlipidemia        Past Surgical History:   Procedure Laterality Date    Cabg  2024    [facility] , Triple bypass    Colonoscopy N/A 10/29/2024    Dr. Anderson;  polyps, sigmoid mass, rectal mass, diverticulosis    Colonoscopy N/A 10/29/2024    Procedure: COLONOSCOPY;  Surgeon: Toni Anderson MD;  Location: [facility] ENDOSCOPY    Egd N/A 10/29/2024    Dr. Anderson; hiatal hernia    Spine surgery procedure unlisted            Vital Signs:  ***      Medications:    Current Outpatient Medications:     prochlorperazine (COMPAZINE) 10 mg tablet, Take 1 tablet (10 mg total) by mouth every 6 (six) hours as needed for Nausea., Disp: 30 tablet, Rfl: 3    ondansetron (ZOFRAN) 8 MG tablet, Take 1 tablet (8 mg total) by mouth every 8 (eight) hours as needed for Nausea., Disp: 30 tablet, Rfl: 3    metFORMIN  MG Oral Tablet 24 Hr, Take 1 tablet (500 mg total) by mouth 2 (two) times daily with meals., Disp: 180 tablet, Rfl: 1    metoprolol tartrate 50 MG Oral Tab, Take 1 tablet (50 mg total) by mouth 2x Daily(Beta Blocker)., Disp: 60 tablet, Rfl: 3    aspirin 81 MG Oral Tab EC, Take 1 tablet (81 mg total) by mouth at bedtime., Disp: , Rfl:     rosuvastatin 10 MG Oral Tab, Take 1 tablet (10 mg total) by mouth nightly., Disp: , Rfl:     Review of Systems:   As in HPI    Physical Examination:  General: Awake, alert, oriented x3, no acute distress.    HEENT:  Anicteric, conjunctivae and sclerae clear, no sinus tenderness, no oropharyngeal lesion/thrush, mucous membranes are moist.  Neck:  Supple, no tenderness, no masses or adenopathy  Lungs:  Clear to auscultation bilaterally  CV:  Regular rate and rhythm  Abdomen:  Non-distended, normoactive bowel sounds, soft,nontender, no hepatosplenomegaly.  Extremities:  No edema, no tenderness  Neuro:  CN 2-12 intact    ECOG: ***    Labs:  ***        Impression/Plan:  Synchronous rectal and sigmoid colon adenocarcinoma  - T3bN0 on rectal MRI for rectal cancer, with adjacent sigmoid colon mass. Both adenocarcinoma, RENÉ  - reviewed at rectal tumor board; due to close proximity, separate surgeries cannot be performed  - planning neoadjuvant  chemotherapy with 6 cycles of FOLFOX with subsequent restaging scans; and at that time we will evaluate if she should proceed to surgery then  - C1D1 FOLFOX today     Iron deficiency anemia  - s/p IV iron dextran 1000mg. Recheck iron studies in ~1/2025         Emotional Well Being:  I have assessed the patient's emotional well-being and any concerns about anxiety or depression.  No acute psychosocial intervention required at this time.    Patient will continue to receive longitudinal care at the [facility] for the complex care required for the cancer diagnosis including the expected complications related to anticancer therapy.    Risk level:  High-colon/rectal cancer on chemotherapy, requiring intervention and close monitoring.    KAI Stout  [facility]         [1] No Known Allergies

## 2024-11-26 ENCOUNTER — OFFICE VISIT (OUTPATIENT)
Dept: HEMATOLOGY/ONCOLOGY | Facility: HOSPITAL | Age: 69
End: 2024-11-26
Attending: STUDENT IN AN ORGANIZED HEALTH CARE EDUCATION/TRAINING PROGRAM
Payer: MEDICARE

## 2024-11-26 ENCOUNTER — LAB ENCOUNTER (OUTPATIENT)
Dept: LAB | Facility: HOSPITAL | Age: 69
End: 2024-11-26
Attending: NURSE PRACTITIONER
Payer: MEDICARE

## 2024-11-26 ENCOUNTER — HOSPITAL ENCOUNTER (OUTPATIENT)
Dept: GENERAL RADIOLOGY | Facility: HOSPITAL | Age: 69
Discharge: HOME | End: 2024-11-26
Attending: NURSE PRACTITIONER
Payer: MEDICARE

## 2024-11-26 VITALS
WEIGHT: 156 LBS | TEMPERATURE: 99 F | HEART RATE: 69 BPM | BODY MASS INDEX: 24.48 KG/M2 | OXYGEN SATURATION: 98 % | DIASTOLIC BLOOD PRESSURE: 81 MMHG | HEIGHT: 66.89 IN | SYSTOLIC BLOOD PRESSURE: 143 MMHG

## 2024-11-26 DIAGNOSIS — R05.1 ACUTE COUGH: ICD-10-CM

## 2024-11-26 DIAGNOSIS — C18.7 ADENOCARCINOMA OF SIGMOID COLON (HCC): ICD-10-CM

## 2024-11-26 DIAGNOSIS — R05.1 ACUTE COUGH: Primary | ICD-10-CM

## 2024-11-26 PROCEDURE — G2211 COMPLEX E/M VISIT ADD ON: HCPCS | Performed by: NURSE PRACTITIONER

## 2024-11-26 PROCEDURE — 71046 X-RAY EXAM CHEST 2 VIEWS: CPT | Performed by: NURSE PRACTITIONER

## 2024-11-26 PROCEDURE — 87637 SARSCOV2&INF A&B&RSV AMP PRB: CPT

## 2024-11-26 PROCEDURE — 99215 OFFICE O/P EST HI 40 MIN: CPT | Performed by: NURSE PRACTITIONER

## 2024-11-26 NOTE — PROGRESS NOTES
Cancer Center Progress Note    Patient Name: Ilda Gutierrez   YOB: 1955   Medical Record Number: [id number]   CSN: [id number]   Date of visit: 11/26/2024       Chief Complaint/Reason for Visit:  Chief Complaint   Patient presents with    Follow - Up        History of Present Illness: Ilda presents today for follow up of sigmoid and rectal cancer. She was diagnosed last month with sigmoid colon mass (21cm) and rectal mass (7-8cm). She started cycle 1 FOLFOX last week on 11/19/2024. Her medical oncologist is Dr. Homero Carvalho, additional oncology history below.    Patient reports that she took two doses of prophylactic antiemetics and did not experience nausea or vomiting. Three days ago, while at an indoor craft show, she felt hot and lightheaded. She felt \"everything went black\" and a friend helped her to sit in a chair. She felt recovered after several minutes of sitting. She reports she developed a non-productive cough on Saturday as well. She was exposed to a sick grandchild. She noticed her appetite has been lower. She also had a sore mouth with dry chapped lips. She denies recent fever or chills.     Oncology History:  9/30/24: visit noted to have blood in stool, iron deficiency anemia  10/29/24: s/p colonoscopy with sigmoid colon mass (at 21cm) and rectal mass (at 7-8cm) with path for both positive for moderately differentiated adenocarcinoma. RENÉ.  10/31/24: Rectal MRI reviewed at rectal tumor board; circumferential mass of upper to mid rectum measuring 6cm. T3bN0. Sigmoid mass adjacent to rectal mass. Separate surgeries cannot be performed.  11/19/24: C1D1 FOLFOX    Problem List:  Patient Active Problem List   Diagnosis    Type 2 diabetes mellitus without complication, without long-term current use of insulin (HCC)    Hypercholesterolemia    Chest pain, precordial    Abnormal stress test    Anemia    Coronary artery disease involving native coronary artery of native heart with unstable angina  pectoris (HCC)    S/P CABG x 3    Iron deficiency anemia    Adenocarcinoma of sigmoid colon (HCC)    Rectal adenocarcinoma (HCC)        Medical History:  Past Medical History:    Coronary atherosclerosis    Diabetes (HCC)    High blood pressure    High cholesterol    Hyperlipidemia       Surgical History:  Past Surgical History:   Procedure Laterality Date    Cabg  2024    [facility] , Triple bypass    Colonoscopy N/A 10/29/2024    Dr. Anderson; polyps, sigmoid mass, rectal mass, diverticulosis    Colonoscopy N/A 10/29/2024    Procedure: COLONOSCOPY;  Surgeon: Toni Anderson MD;  Location: [facility] ENDOSCOPY    Egd N/A 10/29/2024    Dr. Anderson; hiatal hernia    Spine surgery procedure unlisted         Allergies:  Allergies[1]    Family History:  Family History   Problem Relation Age of Onset    Breast Cancer Mother 70       Social History:  Social History     Socioeconomic History    Marital status:      Spouse name: Not on file    Number of children: Not on file    Years of education: Not on file    Highest education level: Not on file   Occupational History    Not on file   Tobacco Use    Smoking status: Never    Smokeless tobacco: Never   Vaping Use    Vaping status: Never Used   Substance and Sexual Activity    Alcohol use: Yes     Comment: OCCASIONAL    Drug use: Never    Sexual activity: Not on file   Other Topics Concern    Not on file   Social History Narrative    Not on file     Social Drivers of Health     Financial Resource Strain: Not on file   Food Insecurity: No Food Insecurity (6/11/2024)    Food Insecurity     Food Insecurity: Never true   Transportation Needs: No Transportation Needs (6/11/2024)    Transportation Needs     Lack of Transportation: No     Car Seat: Not on file   Physical Activity: Not on file   Stress: Not on file   Social Connections: Not on file   Housing Stability: Low Risk  (6/11/2024)    Housing Stability     Housing Instability: No     Housing Instability  Emergency: Not on file     Crib or Bassinette: Not on file       Medications:    Current Outpatient Medications:     metFORMIN  MG Oral Tablet 24 Hr, Take 1 tablet (500 mg total) by mouth 2 (two) times daily with meals., Disp: 180 tablet, Rfl: 1    metoprolol tartrate 50 MG Oral Tab, Take 1 tablet (50 mg total) by mouth 2x Daily(Beta Blocker)., Disp: 60 tablet, Rfl: 3    aspirin 81 MG Oral Tab EC, Take 1 tablet (81 mg total) by mouth at bedtime., Disp: , Rfl:     rosuvastatin 10 MG Oral Tab, Take 1 tablet (10 mg total) by mouth nightly., Disp: , Rfl:     prochlorperazine (COMPAZINE) 10 mg tablet, Take 1 tablet (10 mg total) by mouth every 6 (six) hours as needed for Nausea. (Patient not taking: Reported on 11/26/2024), Disp: 30 tablet, Rfl: 3    ondansetron (ZOFRAN) 8 MG tablet, Take 1 tablet (8 mg total) by mouth every 8 (eight) hours as needed for Nausea. (Patient not taking: Reported on 11/26/2024), Disp: 30 tablet, Rfl: 3    Review of Systems:  A comprehensive 14 point review of systems was completed.  Pertinent positives and negatives noted in the HPI.    Performance Status: ECOG 1 - No physically strenuous activity, but ambulatory and able to carry out light or sedentary work (e.g. office work, light house work).    Physical Examination:  General: Patient is alert and oriented x 3, not in acute distress.  Vital Signs: Height: 169.9 cm (5' 6.89\") (11/26 1008)  Weight: 70.8 kg (156 lb) (11/26 1008)  BSA (Calculated - sq m): 1.82 sq meters (11/26 1008)  Pulse: 69 (11/26 1112)  BP: 143/81 (11/26 1112)  Temp: 98.8 °F (37.1 °C) (11/26 1008)  Do Not Use - Resp Rate: --  SpO2: 98 % (11/26 1112)  HEENT: Anicteric, conjunctivae and sclerae clear, no oropharyngeal lesion/thrush, mucous membranes are dry, chapped cracked area on lower lip   Chest: Clear to auscultation. Respirations unlabored.   Heart: Regular rate and rhythm.   Abdomen: Soft, non-distended, non-tender with present bowel sounds.  Extremities: No  edema.  Neurological: Grossly intact.   Lymphatics: There is no palpable lymphadenopathy throughout in the cervical or supraclavicular regions.   Skin: warm, dry, no erythema or rash   Psych/Depression: mood and affect are appropriate.     Labs:   No results found for this or any previous visit (from the past 72 hours).    Imaging:  XR CHEST PA + LAT CHEST (CPT=71046)    Result Date: 11/26/2024  CONCLUSION:  Lungs are clear.  No acute cardiopulmonary disease.   LOCATION:  [facility]   Dictated by (CST): Fernando Gallagher DO on 11/26/2024 at 1:27 PM     Finalized by (CST): Fernando Galalgher DO on 11/26/2024 at 1:28 PM        Impression/Plan    Rectal and sigmoid colon cancer: adenocarcinoma; RENÉ. Reviewed at rectal tumor board, due to close proximity, separate surgeries cannot be performed at this time. Started FOLFOX last week. Reviewed trajectory and expected recovery of side effects. Plan for 6 cycles followed by restaging scans.     Cough: chest xray negative. Viral swab pending. Supportive care, discussed anti-mucolytics and honey    Planned Follow Up: 1 week follow up with APN, labs and chemo    Risk Level: HIGH colon/rectal cancer receiving chemotherapy requiring close monitoring     The 21st Century Cures Act makes medical notes like these available to patients in the interest of transparency. Please be advised this is a medical document. Medical documents are intended to carry relevant information, facts as evident, and the clinical opinion of the practitioner. The medical note is intended as peer to peer communication and may appear blunt or direct. It is written in medical language and may contain abbreviations or verbiage that are unfamiliar.     Electronically Signed by:    Negra Velasquez, INA, APRN, NP-C, AOCNP  Nurse Practitioner  [facility]         [1] No Known Allergies

## 2024-11-26 NOTE — PROGRESS NOTES
Chief Complaint   Patient presents with    Follow - Up     Pt is here for follow up - Day 8 folfox. She reports decreased appetite but good hydration. Denies N,V,D. Has constipation that she manages with prunes and has small bm's; mouth pain that turned into a lip sore. Cough that started on Saturday; no fever but she feels cold. She reports \"passing out\" at a craft show with her daughter; was helped to a chair and did not hit her head, had full recall of events.     Education Record    Learner:  Patient    Disease / Diagnosis: colon cancer    Barriers / Limitations:  None   Comments:    Method:  Brief focused   Comments:    General Topics:  Diet, Medication, Pain, Side effects and symptom management, Plan of care reviewed, and Fall risk and prevention   Comments:    Outcome:  Needs reinforcement   Comments:

## 2024-11-27 ENCOUNTER — APPOINTMENT (OUTPATIENT)
Dept: CARDIAC REHAB | Facility: HOSPITAL | Age: 69
End: 2024-11-27
Attending: INTERNAL MEDICINE
Payer: MEDICARE

## 2024-11-27 LAB
FLUAV + FLUBV RNA SPEC NAA+PROBE: NOT DETECTED
FLUAV + FLUBV RNA SPEC NAA+PROBE: NOT DETECTED
RSV RNA SPEC NAA+PROBE: NOT DETECTED
SARS-COV-2 RNA RESP QL NAA+PROBE: NOT DETECTED

## 2024-12-03 ENCOUNTER — OFFICE VISIT (OUTPATIENT)
Dept: HEMATOLOGY/ONCOLOGY | Facility: HOSPITAL | Age: 69
End: 2024-12-03
Attending: STUDENT IN AN ORGANIZED HEALTH CARE EDUCATION/TRAINING PROGRAM
Payer: MEDICARE

## 2024-12-03 VITALS
BODY MASS INDEX: 25.11 KG/M2 | HEART RATE: 67 BPM | TEMPERATURE: 97 F | SYSTOLIC BLOOD PRESSURE: 148 MMHG | OXYGEN SATURATION: 100 % | RESPIRATION RATE: 16 BRPM | HEIGHT: 66.89 IN | DIASTOLIC BLOOD PRESSURE: 68 MMHG | WEIGHT: 160 LBS

## 2024-12-03 DIAGNOSIS — C18.7 ADENOCARCINOMA OF SIGMOID COLON (HCC): ICD-10-CM

## 2024-12-03 DIAGNOSIS — C20 RECTAL ADENOCARCINOMA (HCC): Primary | ICD-10-CM

## 2024-12-03 DIAGNOSIS — C20 RECTAL ADENOCARCINOMA (HCC): ICD-10-CM

## 2024-12-03 DIAGNOSIS — C18.7 ADENOCARCINOMA OF SIGMOID COLON (HCC): Primary | ICD-10-CM

## 2024-12-03 LAB
ALBUMIN SERPL-MCNC: 4.2 G/DL (ref 3.2–4.8)
ALBUMIN/GLOB SERPL: 1.8 {RATIO} (ref 1–2)
ALP LIVER SERPL-CCNC: 68 U/L
ALT SERPL-CCNC: 22 U/L
ANION GAP SERPL CALC-SCNC: 6 MMOL/L (ref 0–18)
AST SERPL-CCNC: 24 U/L (ref ?–34)
BASOPHILS # BLD AUTO: 0.08 X10(3) UL (ref 0–0.2)
BASOPHILS NFR BLD AUTO: 2.9 %
BILIRUB SERPL-MCNC: 0.4 MG/DL (ref 0.2–1.1)
BUN BLD-MCNC: 17 MG/DL (ref 9–23)
CALCIUM BLD-MCNC: 9.5 MG/DL (ref 8.7–10.4)
CEA SERPL-MCNC: 3.2 NG/ML (ref ?–5)
CHLORIDE SERPL-SCNC: 109 MMOL/L (ref 98–112)
CO2 SERPL-SCNC: 26 MMOL/L (ref 21–32)
CREAT BLD-MCNC: 0.76 MG/DL
EGFRCR SERPLBLD CKD-EPI 2021: 85 ML/MIN/1.73M2 (ref 60–?)
EOSINOPHIL # BLD AUTO: 0.16 X10(3) UL (ref 0–0.7)
EOSINOPHIL NFR BLD AUTO: 5.7 %
ERYTHROCYTE [DISTWIDTH] IN BLOOD BY AUTOMATED COUNT: 21.9 %
GLOBULIN PLAS-MCNC: 2.3 G/DL (ref 2–3.5)
GLUCOSE BLD-MCNC: 109 MG/DL (ref 70–99)
HCT VFR BLD AUTO: 33.3 %
HGB BLD-MCNC: 10.3 G/DL
IMM GRANULOCYTES # BLD AUTO: 0 X10(3) UL (ref 0–1)
IMM GRANULOCYTES NFR BLD: 0 %
LYMPHOCYTES # BLD AUTO: 1.06 X10(3) UL (ref 1–4)
LYMPHOCYTES NFR BLD AUTO: 37.9 %
MCH RBC QN AUTO: 25.8 PG (ref 26–34)
MCHC RBC AUTO-ENTMCNC: 30.9 G/DL (ref 31–37)
MCV RBC AUTO: 83.5 FL
MONOCYTES # BLD AUTO: 0.48 X10(3) UL (ref 0.1–1)
MONOCYTES NFR BLD AUTO: 17.1 %
NEUTROPHILS # BLD AUTO: 1.02 X10 (3) UL (ref 1.5–7.7)
NEUTROPHILS # BLD AUTO: 1.02 X10(3) UL (ref 1.5–7.7)
NEUTROPHILS NFR BLD AUTO: 36.4 %
OSMOLALITY SERPL CALC.SUM OF ELEC: 294 MOSM/KG (ref 275–295)
PLATELET # BLD AUTO: 240 10(3)UL (ref 150–450)
POTASSIUM SERPL-SCNC: 4.2 MMOL/L (ref 3.5–5.1)
PROT SERPL-MCNC: 6.5 G/DL (ref 5.7–8.2)
RBC # BLD AUTO: 3.99 X10(6)UL
SODIUM SERPL-SCNC: 141 MMOL/L (ref 136–145)
WBC # BLD AUTO: 2.8 X10(3) UL (ref 4–11)

## 2024-12-03 PROCEDURE — 96375 TX/PRO/DX INJ NEW DRUG ADDON: CPT

## 2024-12-03 PROCEDURE — 80053 COMPREHEN METABOLIC PANEL: CPT

## 2024-12-03 PROCEDURE — 85025 COMPLETE CBC W/AUTO DIFF WBC: CPT

## 2024-12-03 PROCEDURE — G2211 COMPLEX E/M VISIT ADD ON: HCPCS | Performed by: NURSE PRACTITIONER

## 2024-12-03 PROCEDURE — 96415 CHEMO IV INFUSION ADDL HR: CPT

## 2024-12-03 PROCEDURE — 82378 CARCINOEMBRYONIC ANTIGEN: CPT

## 2024-12-03 PROCEDURE — 96368 THER/DIAG CONCURRENT INF: CPT

## 2024-12-03 PROCEDURE — 96411 CHEMO IV PUSH ADDL DRUG: CPT

## 2024-12-03 PROCEDURE — 99215 OFFICE O/P EST HI 40 MIN: CPT | Performed by: NURSE PRACTITIONER

## 2024-12-03 PROCEDURE — 96413 CHEMO IV INFUSION 1 HR: CPT

## 2024-12-03 RX ORDER — FLUOROURACIL 50 MG/ML
400 INJECTION, SOLUTION INTRAVENOUS ONCE
Status: CANCELLED | OUTPATIENT
Start: 2024-12-03

## 2024-12-03 RX ORDER — FLUOROURACIL 50 MG/ML
2400 INJECTION, SOLUTION INTRAVENOUS CONTINUOUS
Status: CANCELLED | OUTPATIENT
Start: 2024-12-03

## 2024-12-03 RX ORDER — FLUOROURACIL 50 MG/ML
400 INJECTION, SOLUTION INTRAVENOUS ONCE
Status: COMPLETED | OUTPATIENT
Start: 2024-12-03 | End: 2024-12-03

## 2024-12-03 RX ORDER — FLUOROURACIL 50 MG/ML
2400 INJECTION, SOLUTION INTRAVENOUS CONTINUOUS
Status: DISCONTINUED | OUTPATIENT
Start: 2024-12-03 | End: 2024-12-03

## 2024-12-03 RX ADMIN — FLUOROURACIL 4500 MG: 50 INJECTION, SOLUTION INTRAVENOUS at 12:59:00

## 2024-12-03 RX ADMIN — FLUOROURACIL 750 MG: 50 INJECTION, SOLUTION INTRAVENOUS at 12:51:00

## 2024-12-03 NOTE — PROGRESS NOTES
Pt here for C2D1 Drug(s)FOLFOX.  Arrives Ambulating independently, accompanied by Self and Family member     Patient was evaluated today by LINDSAY.    Oral medications included in this regimen:  no    Patient confirms comprehension of cancer treatment schedule:  yes    Pregnancy screening:  Not applicable    Modifications in dose or schedule:  No - per Negra BOGGS, dose to remain the same, no gcsf inj added at this time; potentially next cycle.     Medications appearance and physical integrity checked by RN: yes.    Chemotherapy IV pump settings verified by 2 RNs:  Yes.  Frequency of blood return and site check throughout administration: Prior to administration, Prior to each drug, and Every 2-3 ml IVP     Infusion/treatment outcome:  patient tolerated treatment without incident    Education Record    Learner:  Patient and Family Member  Barriers / Limitations:  None  Method:  Brief focused and Discussion  Education / instructions given:  POC  Outcome:  Shows understanding    Discharged Home, Ambulating independently, accompanied by:Self and Family member    Patient/family verbalized understanding of future appointments: by printed AVS

## 2024-12-03 NOTE — PROGRESS NOTES
Chief Complaint   Patient presents with    Follow - Up    Chemotherapy     Pt is here for treatment - C2 D1 folfox is expected. Eating and drinking without issue; denies N,V,D, constipation is improved. Continued fatigue, no pain, poor sleep. Lip lesion still present, no interior mouth lesions.    Education Record    Learner:  Patient and Family Member    Disease / Diagnosis: rectal adenocarcinoma    Barriers / Limitations:  None   Comments:    Method:  Brief focused   Comments:    General Topics:  Diet, Medication, Pain, Side effects and symptom management, and Plan of care reviewed   Comments:    Outcome:  Shows understanding   Comments:

## 2024-12-03 NOTE — PROGRESS NOTES
Cancer Center Progress Note    Patient Name: Ilda Gutierrez   YOB: 1955   Medical Record Number: [id number]   CSN: [id number]   Date of visit: 11/26/2024     Chief Complaint/Reason for Visit:  Chief Complaint   Patient presents with    Follow - Up    Chemotherapy        History of Present Illness: Ilda presents today for follow up of sigmoid and rectal cancer. She was diagnosed last month with sigmoid colon mass (21cm) and rectal mass (7-8cm). She started cycle 1 FOLFOX last week on 11/19/2024. Her medical oncologist is Dr. Homero Carvalho, additional oncology history below.    Today, patient reports feeling well. She developed a sore on her lip that is scabbed over. She denies history of cold sores. She did not experience nausea or vomiting. At last visit last week, she reported a cough that has improved. She initially had constipation, that has also improved. She is accompanied by her daughter today.     Oncology History:  9/30/24: visit noted to have blood in stool, iron deficiency anemia  10/29/24: s/p colonoscopy with sigmoid colon mass (at 21cm) and rectal mass (at 7-8cm) with path for both positive for moderately differentiated adenocarcinoma. RENÉ.  10/31/24: Rectal MRI reviewed at rectal tumor board; circumferential mass of upper to mid rectum measuring 6cm. T3bN0. Sigmoid mass adjacent to rectal mass. Separate surgeries cannot be performed.  11/19/24: C1D1 FOLFOX    Problem List:  Patient Active Problem List   Diagnosis    Type 2 diabetes mellitus without complication, without long-term current use of insulin (HCC)    Hypercholesterolemia    Chest pain, precordial    Abnormal stress test    Anemia    Coronary artery disease involving native coronary artery of native heart with unstable angina pectoris (HCC)    S/P CABG x 3    Iron deficiency anemia    Adenocarcinoma of sigmoid colon (HCC)    Rectal adenocarcinoma (HCC)        Medical History:  Past Medical History:    Coronary atherosclerosis     Diabetes (HCC)    High blood pressure    High cholesterol    Hyperlipidemia       Surgical History:  Past Surgical History:   Procedure Laterality Date    Cabg  2024    [facility] , Triple bypass    Colonoscopy N/A 10/29/2024    Dr. Anderson; polyps, sigmoid mass, rectal mass, diverticulosis    Colonoscopy N/A 10/29/2024    Procedure: COLONOSCOPY;  Surgeon: Toni Anderson MD;  Location: [facility] ENDOSCOPY    Egd N/A 10/29/2024    Dr. Anderson; hiatal hernia    Spine surgery procedure unlisted         Allergies:  Allergies[1]    Family History:  Family History   Problem Relation Age of Onset    Breast Cancer Mother 70       Social History:  Social History     Socioeconomic History    Marital status:      Spouse name: Not on file    Number of children: Not on file    Years of education: Not on file    Highest education level: Not on file   Occupational History    Not on file   Tobacco Use    Smoking status: Never    Smokeless tobacco: Never   Vaping Use    Vaping status: Never Used   Substance and Sexual Activity    Alcohol use: Yes     Comment: OCCASIONAL    Drug use: Never    Sexual activity: Not on file   Other Topics Concern    Not on file   Social History Narrative    Not on file     Social Drivers of Health     Financial Resource Strain: Not on file   Food Insecurity: No Food Insecurity (6/11/2024)    Food Insecurity     Food Insecurity: Never true   Transportation Needs: No Transportation Needs (6/11/2024)    Transportation Needs     Lack of Transportation: No     Car Seat: Not on file   Physical Activity: Not on file   Stress: Not on file   Social Connections: Not on file   Housing Stability: Low Risk  (6/11/2024)    Housing Stability     Housing Instability: No     Housing Instability Emergency: Not on file     Crib or Bassinette: Not on file       Medications:    Current Outpatient Medications:     prochlorperazine (COMPAZINE) 10 mg tablet, Take 1 tablet (10 mg total) by mouth every  6 (six) hours as needed for Nausea. (Patient not taking: Reported on 11/26/2024), Disp: 30 tablet, Rfl: 3    ondansetron (ZOFRAN) 8 MG tablet, Take 1 tablet (8 mg total) by mouth every 8 (eight) hours as needed for Nausea. (Patient not taking: Reported on 11/26/2024), Disp: 30 tablet, Rfl: 3    metFORMIN  MG Oral Tablet 24 Hr, Take 1 tablet (500 mg total) by mouth 2 (two) times daily with meals., Disp: 180 tablet, Rfl: 1    metoprolol tartrate 50 MG Oral Tab, Take 1 tablet (50 mg total) by mouth 2x Daily(Beta Blocker)., Disp: 60 tablet, Rfl: 3    aspirin 81 MG Oral Tab EC, Take 1 tablet (81 mg total) by mouth at bedtime., Disp: , Rfl:     rosuvastatin 10 MG Oral Tab, Take 1 tablet (10 mg total) by mouth nightly., Disp: , Rfl:     Review of Systems:  A comprehensive 14 point review of systems was completed.  Pertinent positives and negatives noted in the HPI.    Performance Status: ECOG 1 - No physically strenuous activity, but ambulatory and able to carry out light or sedentary work (e.g. office work, light house work).    Physical Examination:  General: Patient is alert and oriented x 3, not in acute distress.  Vital Signs: Height: 169.9 cm (5' 6.89\") (12/03 0847)  Weight: 72.6 kg (160 lb) (12/03 0847)  BSA (Calculated - sq m): 1.84 sq meters (12/03 0847)  Pulse: 67 (12/03 0847)  BP: 148/68 (12/03 0847)  Temp: 97.2 °F (36.2 °C) (12/03 0847)  Do Not Use - Resp Rate: --  SpO2: 100 % (12/03 0847)  HEENT: Anicteric, conjunctivae and sclerae clear, no oropharyngeal lesion/thrush, mucous membranes are dry, scabbed sore on lower lip   Chest: Clear to auscultation. Respirations unlabored.   Heart: Regular rate and rhythm.   Abdomen: Soft, non-distended, non-tender with present bowel sounds.  Extremities: No edema.  Neurological: Grossly intact.   Lymphatics: There is no palpable lymphadenopathy throughout in the cervical or supraclavicular regions.   Skin: warm, dry, no erythema or rash   Psych/Depression: mood and  affect are appropriate.     Labs:     Recent Results (from the past 72 hours)   CEA [E]    Collection Time: 12/03/24  8:44 AM   Result Value Ref Range    CEA  3.2 <=5.0 ng/mL   CBC W Differential W Platelet    Collection Time: 12/03/24  8:44 AM   Result Value Ref Range    WBC 2.8 (L) 4.0 - 11.0 x10(3) uL    RBC 3.99 3.80 - 5.30 x10(6)uL    HGB 10.3 (L) 12.0 - 16.0 g/dL    HCT 33.3 (L) 35.0 - 48.0 %    .0 150.0 - 450.0 10(3)uL    MCV 83.5 80.0 - 100.0 fL    MCH 25.8 (L) 26.0 - 34.0 pg    MCHC 30.9 (L) 31.0 - 37.0 g/dL    RDW 21.9 %    Neutrophil Absolute Prelim 1.02 (L) 1.50 - 7.70 x10 (3) uL    Neutrophil Absolute 1.02 (L) 1.50 - 7.70 x10(3) uL    Lymphocyte Absolute 1.06 1.00 - 4.00 x10(3) uL    Monocyte Absolute 0.48 0.10 - 1.00 x10(3) uL    Eosinophil Absolute 0.16 0.00 - 0.70 x10(3) uL    Basophil Absolute 0.08 0.00 - 0.20 x10(3) uL    Immature Granulocyte Absolute 0.00 0.00 - 1.00 x10(3) uL    Neutrophil % 36.4 %    Lymphocyte % 37.9 %    Monocyte % 17.1 %    Eosinophil % 5.7 %    Basophil % 2.9 %    Immature Granulocyte % 0.0 %   Comp Metabolic Panel (14)    Collection Time: 12/03/24  8:44 AM   Result Value Ref Range    Glucose 109 (H) 70 - 99 mg/dL    Sodium 141 136 - 145 mmol/L    Potassium 4.2 3.5 - 5.1 mmol/L    Chloride 109 98 - 112 mmol/L    CO2 26.0 21.0 - 32.0 mmol/L    Anion Gap 6 0 - 18 mmol/L    BUN 17 9 - 23 mg/dL    Creatinine 0.76 0.55 - 1.02 mg/dL    Calcium, Total 9.5 8.7 - 10.4 mg/dL    Calculated Osmolality 294 275 - 295 mOsm/kg    eGFR-Cr 85 >=60 mL/min/1.73m2    AST 24 <34 U/L    ALT 22 10 - 49 U/L    Alkaline Phosphatase 68 55 - 142 U/L    Bilirubin, Total 0.4 0.2 - 1.1 mg/dL    Total Protein 6.5 5.7 - 8.2 g/dL    Albumin 4.2 3.2 - 4.8 g/dL    Globulin  2.3 2.0 - 3.5 g/dL    A/G Ratio 1.8 1.0 - 2.0    Patient Fasting for CMP? Patient not present        Impression/Plan    Rectal and sigmoid colon cancer: adenocarcinoma; RENÉ. Reviewed at rectal tumor board, due to close proximity,  separate surgeries cannot be performed at this time. Started FOLFOX two weeks ago, tolerated with expected effects. Labs reviewed, proceed with cycle 2 without modification. If ANC continues to decrease, may need to add pegfilgrastim to future cycles. Plan for 6 cycles followed by restaging scans.     Planned Follow Up: 2 weeks follow up with Dr. Carvalho, labs and chemo    Risk Level: HIGH colon/rectal cancer receiving chemotherapy requiring close monitoring     The 21st Century Cures Act makes medical notes like these available to patients in the interest of transparency. Please be advised this is a medical document. Medical documents are intended to carry relevant information, facts as evident, and the clinical opinion of the practitioner. The medical note is intended as peer to peer communication and may appear blunt or direct. It is written in medical language and may contain abbreviations or verbiage that are unfamiliar.     Electronically Signed by:    Negra Velasquez, INA, APRN, NP-C, AOCNP  Nurse Practitioner  [facility]         [1] No Known Allergies

## 2024-12-04 NOTE — PROGRESS NOTES
Oncology Nutrition F/U Consultation     Patient Name: Ilda Gutierrez  YOB: 1955  Medical Record Number: [id number]            Account Number: [id number]  Dietitian: Maria E Blevins RD, LDN     Date of visit: 12/3/2024     Diet Rx: high protein/quality as tolerated; low residue pending diarrhea     Pertinent Dx/PMH: synchronous sigmoid and rectal adenocarcinoma      Past Medical History       Past Medical History:    Coronary atherosclerosis    Diabetes (HCC)    High blood pressure    High cholesterol    Hyperlipidemia            TX: FOLFOX     Other pertinent subjective/objective information: diet/medical hx     Pertinent Meds:    Medications - Current      Current Outpatient Medications:     prochlorperazine (COMPAZINE) 10 mg tablet, Take 1 tablet (10 mg total) by mouth every 6 (six) hours as needed for Nausea., Disp: 30 tablet, Rfl: 3    ondansetron (ZOFRAN) 8 MG tablet, Take 1 tablet (8 mg total) by mouth every 8 (eight) hours as needed for Nausea., Disp: 30 tablet, Rfl: 3    metFORMIN  MG Oral Tablet 24 Hr, Take 1 tablet (500 mg total) by mouth 2 (two) times daily with meals., Disp: 180 tablet, Rfl: 1    metoprolol tartrate 50 MG Oral Tab, Take 1 tablet (50 mg total) by mouth 2x Daily(Beta Blocker)., Disp: 60 tablet, Rfl: 3    aspirin 81 MG Oral Tab EC, Take 1 tablet (81 mg total) by mouth at bedtime., Disp: , Rfl:     rosuvastatin 10 MG Oral Tab, Take 1 tablet (10 mg total) by mouth nightly., Disp: , Rfl:         Pertinent Labs: toed     Height:  5'7\"             IBW: 135  +/- 10%     WT HX:   12/02/24 72.6 kg (160 lb)   11/19/24 77.1 kg (169 lb 14.4 oz)   11/07/24 73 kg (161 lb)   10/25/24 71.2 kg (157 lb)   09/30/24 73.6 kg (162 lb 3.2 oz)   09/12/24 72.2 kg (159 lb 3.2 oz)         Estimated Nutrition Needs: 23-27 kcals/kg = 3324-0655 KCALS/d; 1.4 gms protein/kg = 108 gms/d     Assessment/Plan: RD f/u w/ pt and daughter in tx area. Pt noted tolerating tx well thus far and is pushing protein  intake. Pt noted wt entered on 11/19 was incorrect and present wt as noted today is accurate.     RD offered support and will continue to follow throughout tx.      The 21st Century Cures Act makes medical notes like these available to patients in the interest of transparency. Please be advised this is a medical document. Medical documents are intended to carry relevant information, facts as evident, and the clinical opinion of the practitioner. The medical note is intended as peer to peer communication and may appear blunt or direct. It is written in medical language and may contain abbreviations or verbiage that are unfamiliar.

## 2024-12-05 ENCOUNTER — NURSE ONLY (OUTPATIENT)
Dept: HEMATOLOGY/ONCOLOGY | Facility: HOSPITAL | Age: 69
End: 2024-12-05
Attending: STUDENT IN AN ORGANIZED HEALTH CARE EDUCATION/TRAINING PROGRAM
Payer: MEDICARE

## 2024-12-05 PROCEDURE — 96523 IRRIG DRUG DELIVERY DEVICE: CPT

## 2024-12-05 NOTE — PROGRESS NOTES
Education Record    Learner:  Patient    Disease / Diagnosis:CADD disconect    Barriers / Limitations:  None   Comments:    Method:  Brief focused   Comments:    General Topics:  Diet, Infection, Medication, Pain, Precautions, Procedure, Side effects and symptom management, Plan of care reviewed, and Fall risk and prevention   Comments:    Outcome:  Shows understanding   Comments:    NO complains at this time

## 2024-12-16 RX ORDER — FLUOROURACIL 50 MG/ML
400 INJECTION, SOLUTION INTRAVENOUS ONCE
Status: CANCELLED | OUTPATIENT
Start: 2024-12-17

## 2024-12-16 RX ORDER — FLUOROURACIL 50 MG/ML
2400 INJECTION, SOLUTION INTRAVENOUS CONTINUOUS
Status: CANCELLED | OUTPATIENT
Start: 2024-12-17

## 2024-12-17 ENCOUNTER — OFFICE VISIT (OUTPATIENT)
Age: 69
End: 2024-12-17
Attending: STUDENT IN AN ORGANIZED HEALTH CARE EDUCATION/TRAINING PROGRAM
Payer: MEDICARE

## 2024-12-17 VITALS
HEART RATE: 69 BPM | HEIGHT: 66.89 IN | SYSTOLIC BLOOD PRESSURE: 139 MMHG | OXYGEN SATURATION: 100 % | DIASTOLIC BLOOD PRESSURE: 84 MMHG | RESPIRATION RATE: 16 BRPM | WEIGHT: 157.19 LBS | TEMPERATURE: 98 F | BODY MASS INDEX: 24.67 KG/M2

## 2024-12-17 DIAGNOSIS — C20 RECTAL ADENOCARCINOMA (HCC): ICD-10-CM

## 2024-12-17 DIAGNOSIS — C18.7 ADENOCARCINOMA OF SIGMOID COLON (HCC): Primary | ICD-10-CM

## 2024-12-17 LAB
ALBUMIN SERPL-MCNC: 4.3 G/DL (ref 3.2–4.8)
ALBUMIN/GLOB SERPL: 1.5 {RATIO} (ref 1–2)
ALP LIVER SERPL-CCNC: 63 U/L
ALT SERPL-CCNC: 30 U/L
ANION GAP SERPL CALC-SCNC: 8 MMOL/L (ref 0–18)
AST SERPL-CCNC: 32 U/L (ref ?–34)
BASOPHILS # BLD AUTO: 0.06 X10(3) UL (ref 0–0.2)
BASOPHILS NFR BLD AUTO: 2 %
BILIRUB SERPL-MCNC: 0.5 MG/DL (ref 0.2–1.1)
BUN BLD-MCNC: 17 MG/DL (ref 9–23)
CALCIUM BLD-MCNC: 9.8 MG/DL (ref 8.7–10.4)
CEA SERPL-MCNC: 2.7 NG/ML (ref ?–5)
CHLORIDE SERPL-SCNC: 108 MMOL/L (ref 98–112)
CO2 SERPL-SCNC: 23 MMOL/L (ref 21–32)
CREAT BLD-MCNC: 0.8 MG/DL
EGFRCR SERPLBLD CKD-EPI 2021: 80 ML/MIN/1.73M2 (ref 60–?)
EOSINOPHIL # BLD AUTO: 0.11 X10(3) UL (ref 0–0.7)
EOSINOPHIL NFR BLD AUTO: 3.6 %
ERYTHROCYTE [DISTWIDTH] IN BLOOD BY AUTOMATED COUNT: 20.4 %
GLOBULIN PLAS-MCNC: 2.9 G/DL (ref 2–3.5)
GLUCOSE BLD-MCNC: 115 MG/DL (ref 70–99)
HCT VFR BLD AUTO: 35.5 %
HGB BLD-MCNC: 11.2 G/DL
IMM GRANULOCYTES # BLD AUTO: 0 X10(3) UL (ref 0–1)
IMM GRANULOCYTES NFR BLD: 0 %
LYMPHOCYTES # BLD AUTO: 1.04 X10(3) UL (ref 1–4)
LYMPHOCYTES NFR BLD AUTO: 33.9 %
MCH RBC QN AUTO: 26.4 PG (ref 26–34)
MCHC RBC AUTO-ENTMCNC: 31.5 G/DL (ref 31–37)
MCV RBC AUTO: 83.7 FL
MONOCYTES # BLD AUTO: 0.62 X10(3) UL (ref 0.1–1)
MONOCYTES NFR BLD AUTO: 20.2 %
NEUTROPHILS # BLD AUTO: 1.24 X10 (3) UL (ref 1.5–7.7)
NEUTROPHILS # BLD AUTO: 1.24 X10(3) UL (ref 1.5–7.7)
NEUTROPHILS NFR BLD AUTO: 40.3 %
OSMOLALITY SERPL CALC.SUM OF ELEC: 290 MOSM/KG (ref 275–295)
PLATELET # BLD AUTO: 172 10(3)UL (ref 150–450)
POTASSIUM SERPL-SCNC: 4.5 MMOL/L (ref 3.5–5.1)
PROT SERPL-MCNC: 7.2 G/DL (ref 5.7–8.2)
RBC # BLD AUTO: 4.24 X10(6)UL
SODIUM SERPL-SCNC: 139 MMOL/L (ref 136–145)
WBC # BLD AUTO: 3.1 X10(3) UL (ref 4–11)

## 2024-12-17 RX ORDER — FLUOROURACIL 50 MG/ML
400 INJECTION, SOLUTION INTRAVENOUS ONCE
Status: COMPLETED | OUTPATIENT
Start: 2024-12-17 | End: 2024-12-17

## 2024-12-17 RX ORDER — FLUOROURACIL 50 MG/ML
2400 INJECTION, SOLUTION INTRAVENOUS CONTINUOUS
Status: DISCONTINUED | OUTPATIENT
Start: 2024-12-17 | End: 2024-12-17

## 2024-12-17 RX ADMIN — FLUOROURACIL 750 MG: 50 INJECTION, SOLUTION INTRAVENOUS at 14:31:00

## 2024-12-17 RX ADMIN — FLUOROURACIL 4500 MG: 50 INJECTION, SOLUTION INTRAVENOUS at 14:31:00

## 2024-12-17 NOTE — PROGRESS NOTES
Hematology/Oncology Clinic Follow Up Visit    Patient Name: Ilda Gutierrez  Medical Record Number: [id number]    YOB: 1955   PCP: Kasey Mendenhall MD    Reason for Consultation:  Ilda Gutierrez was seen today for the diagnosis of synchronous sigmoid and rectal adenocarcinoma    Oncologic History:  9/30/24: visit noted to have blood in stool, iron deficiency anemia  10/29/24: s/p colonoscopy with sigmoid colon mass (at 21cm) and rectal mass (at 7-8cm) with path for both positive for moderately differentiated adenocarcinoma. RENÉ.  10/31/24: Rectal MRI reviewed at rectal tumor board; circumferential mass of upper to mid rectum measuring 6cm. T3bN0. Sigmoid mass adjacent to rectal mass. Separate surgeries cannot be performed.  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    - here with son  - here for C3D1 FOLFOX  - feels very well, no symptoms, minimal toxicity from chemo. No blood in stool    Past Medical History:  Past Medical History:    Coronary atherosclerosis    Diabetes (HCC)    High blood pressure    High cholesterol    Hyperlipidemia     Past Surgical History:   Procedure Laterality Date    Cabg 2024    [facility] , Triple bypass    Colonoscopy N/A 10/29/2024    Dr. Anderson; polyps, sigmoid mass, rectal mass, diverticulosis    Colonoscopy N/A 10/29/2024    Procedure: COLONOSCOPY;  Surgeon: Toni Anderson MD;  Location: [facility] ENDOSCOPY    Egd N/A 10/29/2024    Dr. Anderson; hiatal hernia    Spine surgery procedure unlisted         Home Medications:  No outpatient medications have been marked as taking for the 12/17/24 encounter (Office Visit) with Homero Carvalho MD.       Allergies:   Allergies[1]    Psychosocial History:  Social History     Socioeconomic History    Marital status:       Spouse name: Not on file    Number of children: Not on file    Years of education: Not on file    Highest education level: Not on file   Occupational History    Not on file   Tobacco Use    Smoking status: Never    Smokeless tobacco: Never   Vaping Use    Vaping status: Never Used   Substance and Sexual Activity    Alcohol use: Yes     Comment: OCCASIONAL    Drug use: Never    Sexual activity: Not on file   Other Topics Concern    Not on file   Social History Narrative    Not on file     Social Drivers of Health     Financial Resource Strain: Not on file   Food Insecurity: No Food Insecurity (6/11/2024)    Food Insecurity     Food Insecurity: Never true   Transportation Needs: No Transportation Needs (6/11/2024)    Transportation Needs     Lack of Transportation: No     Car Seat: Not on file   Physical Activity: Not on file   Stress: Not on file   Social Connections: Not on file   Housing Stability: Low Risk  (6/11/2024)    Housing Stability     Housing Instability: No     Housing Instability Emergency: Not on file     Crib or Bassinette: Not on file       Family Medical History:  Family History   Problem Relation Age of Onset    Breast Cancer Mother 70       Review of Systems:  A 10-point ROS was done with pertinent positives and negative per the HPI    Vital Signs:  Height: 169.9 cm (5' 6.89\") (12/17 1000)  Weight: 71.3 kg (157 lb 3.2 oz) (12/17 1000)  BSA (Calculated - sq m): 1.82 sq meters (12/17 1000)  Pulse: 69 (12/17 1000)  BP: 139/84 (12/17 1000)  Temp: 98.4 °F (36.9 °C) (12/17 1000)  Do Not Use - Resp Rate: --  SpO2: 100 % (12/17 1000)    Wt Readings from Last 6 Encounters:   12/17/24 71.3 kg (157 lb 3.2 oz)   12/03/24 72.6 kg (160 lb)   11/26/24 70.8 kg (156 lb)   11/19/24 77.1 kg (169 lb 14.4 oz)   11/07/24 73 kg (161 lb)   10/25/24 71.2 kg (157 lb)       ECOG PS: 1    Physical Examination:  General: Patient is alert and oriented, not in acute distress  Psych: Mood and affect are appropriate  Eyes: EOMI,  PERRL  ENT: Oropharynx is clear, no adenopathy  CV: no LE edema  Respiratory: non-labored respirations  GI/Abd: Soft, non-tender   Neurological: Grossly intact   Lymphatics: No palpable inguinal lymphadenopathy  Skin: no rashes or petechiae    Laboratory:  Lab Results   Component Value Date    WBC 2.8 (L) 12/03/2024    WBC 5.0 11/19/2024    WBC 6.3 11/13/2024    HGB 10.3 (L) 12/03/2024    HGB 9.8 (L) 11/19/2024    HGB 10.0 (L) 11/13/2024    HCT 33.3 (L) 12/03/2024    MCV 83.5 12/03/2024    MCH 25.8 (L) 12/03/2024    MCHC 30.9 (L) 12/03/2024    RDW 21.9 12/03/2024    .0 12/03/2024    .0 11/19/2024    .0 11/13/2024     Lab Results   Component Value Date     (H) 12/03/2024    BUN 17 12/03/2024    CREATSERUM 0.76 12/03/2024    CREATSERUM 0.78 11/19/2024    CREATSERUM 0.81 09/11/2024    ANIONGAP 6 12/03/2024    GFR 94 08/23/2017    CA 9.5 12/03/2024    OSMOCALC 294 12/03/2024    ALKPHO 68 12/03/2024    AST 24 12/03/2024    ALT 22 12/03/2024    BILT 0.4 12/03/2024    TP 6.5 12/03/2024    ALB 4.2 12/03/2024    GLOBULIN 2.3 12/03/2024     12/03/2024    K 4.2 12/03/2024     12/03/2024    CO2 26.0 12/03/2024     Lab Results   Component Value Date    PTT 23.7 06/11/2024    INR 0.09 (A) 11/15/2024     Impression & Plan:     Synchronous rectal and sigmoid colon adenocarcinoma  - T3bN0 on rectal MRI for rectal cancer, with adjacent sigmoid colon mass. Both adenocarcinoma, RENÉ  - reviewed at rectal tumor board; due to close proximity, separate surgeries cannot be performed  - we discussed that under ordinary circumstances, her rectal cancer in isolation would be treated with total neoadjuvant therapy with neoadjuvant chemo-radiation followed by chemotherapy with a chance of not needing surgery. Her colon cancer in isolation would be treated with upfront resection followed by possible chemotherapy. However given that she has synchronous colon and rectal primaries, I recommended neoadjuvant  FOLFOX which is highly active against both colon and rectal adenocarcinoma. This is supported by the FOXTROT study which found that 6 weeks of neoadjuvant FOLFOX in colon cancer increased pathologic downstaging, and the PROSPECT trial which found that neoadjuvant therapy with 3 months of FOLFOX with sphincter-sparing surgery was noninferior to the usage of neoadjuvant chemo-radiation.  - recommended neoadjuvant chemotherapy with 6 cycles of FOLFOX with subsequent restaging scans; and at that time we will evaluate if she should proceed to surgery then  - C3D1 FOLFOX today    Iron deficiency anemia  - s/p IV iron dextran 1000mg. Recheck iron studies in ~1/2025    CAD  - s/p 3v CABG 6/2024  - on ASA 81mg  - Dr Saleem is her cardiologist    F/u: C4    Homero Carvalho  Hematology/Medical Oncology  [facility]             [1] No Known Allergies

## 2024-12-17 NOTE — PROGRESS NOTES
Pt here for C3D1 Drug(s)folfox.  Arrives Ambulating independently, accompanied by Self and Family member     Patient was evaluated today by MD.    Oral medications included in this regimen:  no    Patient confirms comprehension of cancer treatment schedule:  yes    Pregnancy screening:  Not applicable    Modifications in dose or schedule:  No    Medications appearance and physical integrity checked by RN: yes.    Chemotherapy IV pump settings verified by 2 RNs:  Yes.  Frequency of blood return and site check throughout administration: Prior to administration, Prior to each drug, and Every 2-3 ml IVP     Infusion/treatment outcome:  patient tolerated treatment without incident    Education Record    Learner:  Patient and Family Member  Barriers / Limitations:  None  Method:  Brief focused and Discussion  Education / instructions given:  POC  Outcome:  Shows understanding    Discharged Home, Ambulating independently, accompanied by:Self and Family member    Patient/family verbalized understanding of future appointments: by MyChart messaging

## 2024-12-17 NOTE — PROGRESS NOTES
Education Record    Learner:  Patient    Disease / Diagnosis: Sigmoid/Rectal Adenocarcinoma    Barriers / Limitations:  None   Comments:    Method:  Discussion   Comments:    General Topics:  Medication, Side effects and symptom management, and Plan of care reviewed   Comments:    Outcome:  Shows understanding   Comments:    Here for C3D1 FOLFOX. Her only side effect is cold sensitivity for 2 days. Otherwise, no side effects.

## 2024-12-19 ENCOUNTER — NURSE ONLY (OUTPATIENT)
Age: 69
End: 2024-12-19
Attending: STUDENT IN AN ORGANIZED HEALTH CARE EDUCATION/TRAINING PROGRAM

## 2024-12-19 NOTE — PROGRESS NOTES
Education Record    Learner:  Patient    Disease / Diagnosis:adenocarcinoma of sigmoid colon    Barriers / Limitations:  None   Comments:    Method:  Brief focused   Comments:    General Topics:  Plan of care reviewed   Comments:    Outcome:  Shows understanding   Comments:    5FU pump disconnected per protocol.  Pt discharged in stable condition.

## 2024-12-27 ENCOUNTER — HOSPITAL ENCOUNTER (OUTPATIENT)
Dept: MAMMOGRAPHY | Age: 69
Discharge: HOME | End: 2024-12-27
Attending: NURSE PRACTITIONER
Payer: MEDICARE

## 2024-12-27 DIAGNOSIS — Z12.31 ENCOUNTER FOR SCREENING MAMMOGRAM FOR MALIGNANT NEOPLASM OF BREAST: ICD-10-CM

## 2024-12-27 PROCEDURE — 77063 BREAST TOMOSYNTHESIS BI: CPT | Performed by: NURSE PRACTITIONER

## 2024-12-27 PROCEDURE — 77067 SCR MAMMO BI INCL CAD: CPT | Performed by: NURSE PRACTITIONER

## 2024-12-31 ENCOUNTER — OFFICE VISIT (OUTPATIENT)
Age: 69
End: 2024-12-31
Attending: STUDENT IN AN ORGANIZED HEALTH CARE EDUCATION/TRAINING PROGRAM

## 2024-12-31 VITALS
OXYGEN SATURATION: 97 % | BODY MASS INDEX: 24.8 KG/M2 | TEMPERATURE: 98 F | HEIGHT: 66.89 IN | RESPIRATION RATE: 18 BRPM | HEART RATE: 91 BPM | DIASTOLIC BLOOD PRESSURE: 72 MMHG | WEIGHT: 158 LBS | SYSTOLIC BLOOD PRESSURE: 148 MMHG

## 2024-12-31 DIAGNOSIS — D70.1 CHEMOTHERAPY INDUCED NEUTROPENIA (HCC): ICD-10-CM

## 2024-12-31 DIAGNOSIS — T45.1X5A CHEMOTHERAPY INDUCED NEUTROPENIA (HCC): ICD-10-CM

## 2024-12-31 DIAGNOSIS — C18.7 ADENOCARCINOMA OF SIGMOID COLON (HCC): Primary | ICD-10-CM

## 2024-12-31 DIAGNOSIS — C20 RECTAL ADENOCARCINOMA (HCC): ICD-10-CM

## 2024-12-31 LAB
ALBUMIN SERPL-MCNC: 4.5 G/DL (ref 3.2–4.8)
ALBUMIN/GLOB SERPL: 2 {RATIO} (ref 1–2)
ALP LIVER SERPL-CCNC: 56 U/L
ALT SERPL-CCNC: 49 U/L
ANION GAP SERPL CALC-SCNC: 10 MMOL/L (ref 0–18)
AST SERPL-CCNC: 53 U/L (ref ?–34)
BASOPHILS # BLD AUTO: 0.06 X10(3) UL (ref 0–0.2)
BASOPHILS NFR BLD AUTO: 2.3 %
BILIRUB SERPL-MCNC: 0.5 MG/DL (ref 0.2–1.1)
BUN BLD-MCNC: 17 MG/DL (ref 9–23)
CALCIUM BLD-MCNC: 9.9 MG/DL (ref 8.7–10.4)
CEA SERPL-MCNC: 2.2 NG/ML (ref ?–5)
CHLORIDE SERPL-SCNC: 109 MMOL/L (ref 98–112)
CO2 SERPL-SCNC: 22 MMOL/L (ref 21–32)
CREAT BLD-MCNC: 0.89 MG/DL
EGFRCR SERPLBLD CKD-EPI 2021: 70 ML/MIN/1.73M2 (ref 60–?)
EOSINOPHIL # BLD AUTO: 0.09 X10(3) UL (ref 0–0.7)
EOSINOPHIL NFR BLD AUTO: 3.4 %
ERYTHROCYTE [DISTWIDTH] IN BLOOD BY AUTOMATED COUNT: 19.9 %
GLOBULIN PLAS-MCNC: 2.3 G/DL (ref 2–3.5)
GLUCOSE BLD-MCNC: 114 MG/DL (ref 70–99)
HCT VFR BLD AUTO: 36.1 %
HGB BLD-MCNC: 11.7 G/DL
IMM GRANULOCYTES # BLD AUTO: 0 X10(3) UL (ref 0–1)
IMM GRANULOCYTES NFR BLD: 0 %
LYMPHOCYTES # BLD AUTO: 1.07 X10(3) UL (ref 1–4)
LYMPHOCYTES NFR BLD AUTO: 40.2 %
MCH RBC QN AUTO: 27.5 PG (ref 26–34)
MCHC RBC AUTO-ENTMCNC: 32.4 G/DL (ref 31–37)
MCV RBC AUTO: 84.9 FL
MONOCYTES # BLD AUTO: 0.48 X10(3) UL (ref 0.1–1)
MONOCYTES NFR BLD AUTO: 18 %
NEUTROPHILS # BLD AUTO: 0.96 X10 (3) UL (ref 1.5–7.7)
NEUTROPHILS # BLD AUTO: 0.96 X10(3) UL (ref 1.5–7.7)
NEUTROPHILS NFR BLD AUTO: 36.1 %
OSMOLALITY SERPL CALC.SUM OF ELEC: 294 MOSM/KG (ref 275–295)
PLATELET # BLD AUTO: 146 10(3)UL (ref 150–450)
POTASSIUM SERPL-SCNC: 4 MMOL/L (ref 3.5–5.1)
PROT SERPL-MCNC: 6.8 G/DL (ref 5.7–8.2)
RBC # BLD AUTO: 4.25 X10(6)UL
SODIUM SERPL-SCNC: 141 MMOL/L (ref 136–145)
WBC # BLD AUTO: 2.7 X10(3) UL (ref 4–11)

## 2024-12-31 RX ORDER — FLUOROURACIL 50 MG/ML
400 INJECTION, SOLUTION INTRAVENOUS ONCE
Status: CANCELLED | OUTPATIENT
Start: 2024-12-31

## 2024-12-31 RX ORDER — FLUOROURACIL 50 MG/ML
2400 INJECTION, SOLUTION INTRAVENOUS CONTINUOUS
Status: CANCELLED | OUTPATIENT
Start: 2024-12-31

## 2024-12-31 RX ORDER — FLUOROURACIL 50 MG/ML
2400 INJECTION, SOLUTION INTRAVENOUS CONTINUOUS
Status: DISCONTINUED | OUTPATIENT
Start: 2024-12-31 | End: 2024-12-31

## 2024-12-31 RX ORDER — FLUOROURACIL 50 MG/ML
400 INJECTION, SOLUTION INTRAVENOUS ONCE
Status: COMPLETED | OUTPATIENT
Start: 2024-12-31 | End: 2024-12-31

## 2024-12-31 RX ADMIN — FLUOROURACIL 4500 MG: 50 INJECTION, SOLUTION INTRAVENOUS at 15:06:00

## 2024-12-31 RX ADMIN — FLUOROURACIL 750 MG: 50 INJECTION, SOLUTION INTRAVENOUS at 14:58:00

## 2024-12-31 NOTE — PROGRESS NOTES
Pt here for C4D1 Drug(s)FOLFOX.  Arrives Ambulating independently, accompanied by Self     Patient was evaluated today by LINDSAY.    Oral medications included in this regimen:  no    Patient confirms comprehension of cancer treatment schedule:  yes    Pregnancy screening:  Denies possibility of pregnancy    Modifications in dose or schedule:  No    Medications appearance and physical integrity checked by RN: yes.    Chemotherapy IV pump settings verified by 2 RNs:  Yes.  Frequency of blood return and site check throughout administration: Prior to administration, Every 2-3 ml IVP, and At completion of therapy     Infusion/treatment outcome:  patient tolerated treatment without incident    Education Record    Learner:  Patient  Barriers / Limitations:  None  Method:  Brief focused  Education / instructions given:  medications, plan of care, side effects  Outcome:  Shows understanding    Discharged Home, Ambulating independently, accompanied by:Self    Patient/family verbalized understanding of future appointments: by [employer] messaging

## 2024-12-31 NOTE — PROGRESS NOTES
Cancer Center Progress Note    Patient Name: Ilda Gutierrez   YOB: 1955   Medical Record Number: [id number]   Date of visit: 12/31/2024    Chief Complaint/Reason for Visit:  Chief Complaint   Patient presents with    Chemotherapy    Follow - Up        History of Present Illness: Ilda presents today for follow up of sigmoid and rectal cancer. She was diagnosed last month with sigmoid colon mass (21cm) and rectal mass (7-8cm). She started cycle 1 FOLFOX on 11/19/2024. Her medical oncologist is Dr. Homero Carvalho, additional oncology history below.    Today, patient reports feeling well. She denies new side effects from chemotherapy. She denies peripheral neuropathy. She denies recent fever or chills.     Oncology History:  9/30/24: visit noted to have blood in stool, iron deficiency anemia  10/29/24: s/p colonoscopy with sigmoid colon mass (at 21cm) and rectal mass (at 7-8cm) with path for both positive for moderately differentiated adenocarcinoma. RENÉ.  10/31/24: Rectal MRI reviewed at rectal tumor board; circumferential mass of upper to mid rectum measuring 6cm. T3bN0. Sigmoid mass adjacent to rectal mass. Separate surgeries cannot be performed.  11/19/24: C1D1 FOLFOX  12/3/24: C2D1 FOLFOX  12/17/24: C3D1 FOLFOX    Problem List:  Patient Active Problem List   Diagnosis    Type 2 diabetes mellitus without complication, without long-term current use of insulin (HCC)    Hypercholesterolemia    Chest pain, precordial    Abnormal stress test    Anemia    Coronary artery disease involving native coronary artery of native heart with unstable angina pectoris (HCC)    S/P CABG x 3    Iron deficiency anemia    Adenocarcinoma of sigmoid colon (HCC)    Rectal adenocarcinoma (HCC)        Medical History:  Past Medical History:    Coronary atherosclerosis    Diabetes (HCC)    High blood pressure    High cholesterol    Hyperlipidemia       Surgical History:  Past Surgical History:   Procedure Laterality Date    Cabg   2024    [facility] , Triple bypass    Colonoscopy N/A 10/29/2024    Dr. Anderson; polyps, sigmoid mass, rectal mass, diverticulosis    Colonoscopy N/A 10/29/2024    Procedure: COLONOSCOPY;  Surgeon: Toni Anderson MD;  Location: [facility] ENDOSCOPY    Egd N/A 10/29/2024    Dr. Anderson; hiatal hernia    Spine surgery procedure unlisted         Allergies:  Allergies[1]    Family History:  Family History   Problem Relation Age of Onset    Breast Cancer Mother 70       Social History:  Social History     Socioeconomic History    Marital status:      Spouse name: Not on file    Number of children: Not on file    Years of education: Not on file    Highest education level: Not on file   Occupational History    Not on file   Tobacco Use    Smoking status: Never    Smokeless tobacco: Never   Vaping Use    Vaping status: Never Used   Substance and Sexual Activity    Alcohol use: Yes     Comment: OCCASIONAL    Drug use: Never    Sexual activity: Not on file   Other Topics Concern    Not on file   Social History Narrative    Not on file     Social Drivers of Health     Financial Resource Strain: Not on file   Food Insecurity: No Food Insecurity (6/11/2024)    Food Insecurity     Food Insecurity: Never true   Transportation Needs: No Transportation Needs (6/11/2024)    Transportation Needs     Lack of Transportation: No     Car Seat: Not on file   Physical Activity: Not on file   Stress: Not on file   Social Connections: Not on file   Housing Stability: Low Risk  (6/11/2024)    Housing Stability     Housing Instability: No     Housing Instability Emergency: Not on file     Crib or Bassinette: Not on file       Medications:    Current Outpatient Medications:     metFORMIN  MG Oral Tablet 24 Hr, Take 1 tablet (500 mg total) by mouth 2 (two) times daily with meals., Disp: 180 tablet, Rfl: 1    metoprolol tartrate 50 MG Oral Tab, Take 1 tablet (50 mg total) by mouth 2x Daily(Beta Blocker)., Disp: 60  tablet, Rfl: 3    aspirin 81 MG Oral Tab EC, Take 1 tablet (81 mg total) by mouth at bedtime., Disp: , Rfl:     rosuvastatin 10 MG Oral Tab, Take 1 tablet (10 mg total) by mouth nightly., Disp: , Rfl:     prochlorperazine (COMPAZINE) 10 mg tablet, Take 1 tablet (10 mg total) by mouth every 6 (six) hours as needed for Nausea. (Patient not taking: Reported on 11/26/2024), Disp: 30 tablet, Rfl: 3    ondansetron (ZOFRAN) 8 MG tablet, Take 1 tablet (8 mg total) by mouth every 8 (eight) hours as needed for Nausea. (Patient not taking: Reported on 11/26/2024), Disp: 30 tablet, Rfl: 3    Review of Systems:  A comprehensive 14 point review of systems was completed.  Pertinent positives and negatives noted in the HPI.    Performance Status: ECOG 1 - No physically strenuous activity, but ambulatory and able to carry out light or sedentary work (e.g. office work, light house work).    Physical Examination:  General: Patient is alert and oriented x 3, not in acute distress.  Vital Signs: Height: 169.9 cm (5' 6.89\") (12/31 1054)  Weight: 71.7 kg (158 lb) (12/31 1054)  BSA (Calculated - sq m): 1.83 sq meters (12/31 1054)  Pulse: 91 (12/31 1054)  BP: 148/72 (12/31 1054)  Temp: 97.9 °F (36.6 °C) (12/31 1054)  Do Not Use - Resp Rate: --  SpO2: 97 % (12/31 1054)  HEENT: Anicteric, conjunctivae and sclerae clear, no oropharyngeal lesion/thrush, mucous membranes are dry,   Chest: Clear to auscultation. Respirations unlabored.   Heart: Regular rate and rhythm.   Abdomen: Soft, non-distended, non-tender with present bowel sounds.  Extremities: No edema.  Neurological: Grossly intact.   Lymphatics: There is no palpable lymphadenopathy throughout in the cervical or supraclavicular regions.   Skin: warm, dry, no erythema or rash   Psych/Depression: mood and affect are appropriate.     Labs:     Recent Results (from the past 72 hours)   CEA [E]    Collection Time: 12/31/24 10:35 AM   Result Value Ref Range    CEA  2.2 <=5.0 ng/mL   CBC W  Differential W Platelet    Collection Time: 12/31/24 10:35 AM   Result Value Ref Range    WBC 2.7 (L) 4.0 - 11.0 x10(3) uL    RBC 4.25 3.80 - 5.30 x10(6)uL    HGB 11.7 (L) 12.0 - 16.0 g/dL    HCT 36.1 35.0 - 48.0 %    .0 (L) 150.0 - 450.0 10(3)uL    MCV 84.9 80.0 - 100.0 fL    MCH 27.5 26.0 - 34.0 pg    MCHC 32.4 31.0 - 37.0 g/dL    RDW 19.9 %    Neutrophil Absolute Prelim 0.96 (L) 1.50 - 7.70 x10 (3) uL    Neutrophil Absolute 0.96 (L) 1.50 - 7.70 x10(3) uL    Lymphocyte Absolute 1.07 1.00 - 4.00 x10(3) uL    Monocyte Absolute 0.48 0.10 - 1.00 x10(3) uL    Eosinophil Absolute 0.09 0.00 - 0.70 x10(3) uL    Basophil Absolute 0.06 0.00 - 0.20 x10(3) uL    Immature Granulocyte Absolute 0.00 0.00 - 1.00 x10(3) uL    Neutrophil % 36.1 %    Lymphocyte % 40.2 %    Monocyte % 18.0 %    Eosinophil % 3.4 %    Basophil % 2.3 %    Immature Granulocyte % 0.0 %   Comp Metabolic Panel (14)    Collection Time: 12/31/24 10:35 AM   Result Value Ref Range    Glucose 114 (H) 70 - 99 mg/dL    Sodium 141 136 - 145 mmol/L    Potassium 4.0 3.5 - 5.1 mmol/L    Chloride 109 98 - 112 mmol/L    CO2 22.0 21.0 - 32.0 mmol/L    Anion Gap 10 0 - 18 mmol/L    BUN 17 9 - 23 mg/dL    Creatinine 0.89 0.55 - 1.02 mg/dL    Calcium, Total 9.9 8.7 - 10.4 mg/dL    Calculated Osmolality 294 275 - 295 mOsm/kg    eGFR-Cr 70 >=60 mL/min/1.73m2    AST 53 (H) <34 U/L    ALT 49 10 - 49 U/L    Alkaline Phosphatase 56 55 - 142 U/L    Bilirubin, Total 0.5 0.2 - 1.1 mg/dL    Total Protein 6.8 5.7 - 8.2 g/dL    Albumin 4.5 3.2 - 4.8 g/dL    Globulin  2.3 2.0 - 3.5 g/dL    A/G Ratio 2.0 1.0 - 2.0    Patient Fasting for CMP? Patient not present        Impression/Plan    Rectal and sigmoid colon cancer: adenocarcinoma; RENÉ. Reviewed at rectal tumor board, due to close proximity, separate surgeries cannot be performed at this time. Started FOLFOX on 11/19/2024 and is tolerating thus far with mild side effects. Labs reviewed, proceed with cycle 4 today.  Plan for 6  cycles followed by restaging scans to determine surgery plan.    Neutropenia: grade 3, will add pegfilgrastim to day 3 this cycle. Reviewed neutropenia precautions with patient.     Planned Follow Up: 2 weeks follow up with Dr. Carvalho, labs and chemo    Risk Level: HIGH colon/rectal cancer receiving chemotherapy requiring close monitoring     The 21st Century Cures Act makes medical notes like these available to patients in the interest of transparency. Please be advised this is a medical document. Medical documents are intended to carry relevant information, facts as evident, and the clinical opinion of the practitioner. The medical note is intended as peer to peer communication and may appear blunt or direct. It is written in medical language and may contain abbreviations or verbiage that are unfamiliar.     Electronically Signed by:    Negra Velasquez, INA, APRN, NP-C, AOCNP  Nurse Practitioner  [facility]         [1] No Known Allergies

## 2024-12-31 NOTE — PROGRESS NOTES
Chief Complaint   Patient presents with    Chemotherapy    Follow - Up     Pt is here for treatment - C4 D1 folfox is expected. Eating and drinking without issue; denies N,V,D; constipation is controlled with prune juice. Energy is good.     Education Record    Learner:  Patient    Disease / Diagnosis: rectal adenocarcinoma    Barriers / Limitations:  None   Comments:    Method:  Brief focused   Comments:    General Topics:  Diet, Medication, Pain, Side effects and symptom management, and Plan of care reviewed   Comments:    Outcome:  Shows understanding   Comments:

## 2025-01-02 ENCOUNTER — OFFICE VISIT (OUTPATIENT)
Age: 70
End: 2025-01-02
Attending: STUDENT IN AN ORGANIZED HEALTH CARE EDUCATION/TRAINING PROGRAM
Payer: MEDICARE

## 2025-01-02 DIAGNOSIS — C18.7 ADENOCARCINOMA OF SIGMOID COLON (HCC): Primary | ICD-10-CM

## 2025-01-02 DIAGNOSIS — C20 RECTAL ADENOCARCINOMA (HCC): ICD-10-CM

## 2025-01-02 NOTE — PROGRESS NOTES
Education Record    Learner:  Patient    Disease / Diagnosis: Colon Cancer    Barriers / Limitations:  None   Comments:    Method:  Brief focused   Comments:    General Topics:  Plan of care reviewed, uses and side effects from fulphila   Comments:    Outcome:  Shows understanding   Comments:

## 2025-01-13 RX ORDER — FLUOROURACIL 50 MG/ML
2400 INJECTION, SOLUTION INTRAVENOUS CONTINUOUS
Status: CANCELLED | OUTPATIENT
Start: 2025-01-14

## 2025-01-13 RX ORDER — FLUOROURACIL 50 MG/ML
400 INJECTION, SOLUTION INTRAVENOUS ONCE
Status: CANCELLED | OUTPATIENT
Start: 2025-01-14

## 2025-01-14 ENCOUNTER — OFFICE VISIT (OUTPATIENT)
Age: 70
End: 2025-01-14
Attending: STUDENT IN AN ORGANIZED HEALTH CARE EDUCATION/TRAINING PROGRAM
Payer: MEDICARE

## 2025-01-14 VITALS
TEMPERATURE: 97 F | DIASTOLIC BLOOD PRESSURE: 67 MMHG | HEART RATE: 75 BPM | RESPIRATION RATE: 16 BRPM | WEIGHT: 161.38 LBS | HEIGHT: 66.89 IN | SYSTOLIC BLOOD PRESSURE: 149 MMHG | OXYGEN SATURATION: 97 % | BODY MASS INDEX: 25.33 KG/M2

## 2025-01-14 DIAGNOSIS — C18.7 ADENOCARCINOMA OF SIGMOID COLON (HCC): Primary | ICD-10-CM

## 2025-01-14 DIAGNOSIS — C20 RECTAL ADENOCARCINOMA (HCC): ICD-10-CM

## 2025-01-14 LAB
ALBUMIN SERPL-MCNC: 4.3 G/DL (ref 3.2–4.8)
ALBUMIN/GLOB SERPL: 2 {RATIO} (ref 1–2)
ALP LIVER SERPL-CCNC: 90 U/L
ALT SERPL-CCNC: 35 U/L
ANION GAP SERPL CALC-SCNC: 10 MMOL/L (ref 0–18)
AST SERPL-CCNC: 38 U/L (ref ?–34)
BASOPHILS # BLD AUTO: 0.08 X10(3) UL (ref 0–0.2)
BASOPHILS NFR BLD AUTO: 1.2 %
BILIRUB SERPL-MCNC: 0.4 MG/DL (ref 0.2–1.1)
BUN BLD-MCNC: 11 MG/DL (ref 9–23)
CALCIUM BLD-MCNC: 9.8 MG/DL (ref 8.7–10.6)
CEA SERPL-MCNC: 2.2 NG/ML (ref ?–5)
CHLORIDE SERPL-SCNC: 110 MMOL/L (ref 98–112)
CO2 SERPL-SCNC: 22 MMOL/L (ref 21–32)
CREAT BLD-MCNC: 0.79 MG/DL
DEPRECATED HBV CORE AB SER IA-ACNC: 289 NG/ML
EGFRCR SERPLBLD CKD-EPI 2021: 81 ML/MIN/1.73M2 (ref 60–?)
EOSINOPHIL # BLD AUTO: 0.18 X10(3) UL (ref 0–0.7)
EOSINOPHIL NFR BLD AUTO: 2.6 %
ERYTHROCYTE [DISTWIDTH] IN BLOOD BY AUTOMATED COUNT: 19.6 %
FASTING STATUS PATIENT QL REPORTED: NO
GLOBULIN PLAS-MCNC: 2.2 G/DL (ref 2–3.5)
GLUCOSE BLD-MCNC: 122 MG/DL (ref 70–99)
HCT VFR BLD AUTO: 35.6 %
HGB BLD-MCNC: 11.5 G/DL
IMM GRANULOCYTES # BLD AUTO: 0.09 X10(3) UL (ref 0–1)
IMM GRANULOCYTES NFR BLD: 1.3 %
IRON SATN MFR SERPL: 23 %
IRON SERPL-MCNC: 97 UG/DL
LYMPHOCYTES # BLD AUTO: 1.39 X10(3) UL (ref 1–4)
LYMPHOCYTES NFR BLD AUTO: 20 %
MCH RBC QN AUTO: 28.1 PG (ref 26–34)
MCHC RBC AUTO-ENTMCNC: 32.3 G/DL (ref 31–37)
MCV RBC AUTO: 87 FL
MONOCYTES # BLD AUTO: 0.68 X10(3) UL (ref 0.1–1)
MONOCYTES NFR BLD AUTO: 9.8 %
NEUTROPHILS # BLD AUTO: 4.52 X10 (3) UL (ref 1.5–7.7)
NEUTROPHILS # BLD AUTO: 4.52 X10(3) UL (ref 1.5–7.7)
NEUTROPHILS NFR BLD AUTO: 65.1 %
OSMOLALITY SERPL CALC.SUM OF ELEC: 295 MOSM/KG (ref 275–295)
PLATELET # BLD AUTO: 105 10(3)UL (ref 150–450)
POTASSIUM SERPL-SCNC: 4.2 MMOL/L (ref 3.5–5.1)
PROT SERPL-MCNC: 6.5 G/DL (ref 5.7–8.2)
RBC # BLD AUTO: 4.09 X10(6)UL
SODIUM SERPL-SCNC: 142 MMOL/L (ref 136–145)
TOTAL IRON BINDING CAPACITY: 417 UG/DL (ref 250–425)
TRANSFERRIN SERPL-MCNC: 325 MG/DL (ref 250–380)
WBC # BLD AUTO: 6.9 X10(3) UL (ref 4–11)

## 2025-01-14 RX ORDER — FLUOROURACIL 50 MG/ML
2400 INJECTION, SOLUTION INTRAVENOUS CONTINUOUS
Status: DISCONTINUED | OUTPATIENT
Start: 2025-01-14 | End: 2025-01-14

## 2025-01-14 RX ORDER — FLUOROURACIL 50 MG/ML
400 INJECTION, SOLUTION INTRAVENOUS ONCE
Status: COMPLETED | OUTPATIENT
Start: 2025-01-14 | End: 2025-01-14

## 2025-01-14 RX ADMIN — FLUOROURACIL 750 MG: 50 INJECTION, SOLUTION INTRAVENOUS at 12:18:00

## 2025-01-14 RX ADMIN — FLUOROURACIL 4500 MG: 50 INJECTION, SOLUTION INTRAVENOUS at 12:24:00

## 2025-01-14 NOTE — PROGRESS NOTES
Hematology/Oncology Clinic Follow Up Visit    Patient Name: Ilda Gutierrez  Medical Record Number: [id number]    YOB: 1955   PCP: Kasey Mendenhall MD    Reason for Consultation:  Ilda Gutierrez was seen today for the diagnosis of synchronous sigmoid and rectal adenocarcinoma    Oncologic History:  9/30/24: visit noted to have blood in stool, iron deficiency anemia  10/29/24: s/p colonoscopy with sigmoid colon mass (at 21cm) and rectal mass (at 7-8cm) with path for both positive for moderately differentiated adenocarcinoma. RENÉ.  10/31/24: Rectal MRI reviewed at rectal tumor board; circumferential mass of upper to mid rectum measuring 6cm. T3bN0. Sigmoid mass adjacent to rectal mass. Separate surgeries cannot be performed.  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    - here with   - here for C5D1 FOLFOX  - feels very well, no symptoms. No blood in stool  - appetite/weight stable    Past Medical History:  Past Medical History:    Coronary atherosclerosis    Diabetes (HCC)    High blood pressure    High cholesterol    Hyperlipidemia     Past Surgical History:   Procedure Laterality Date    Cabg 2024    [facility] , Triple bypass    Colonoscopy N/A 10/29/2024    Dr. Anderson; polyps, sigmoid mass, rectal mass, diverticulosis    Colonoscopy N/A 10/29/2024    Procedure: COLONOSCOPY;  Surgeon: Toni Anderson MD;  Location: [facility] ENDOSCOPY    Egd N/A 10/29/2024    Dr. Anderson; hiatal hernia    Spine surgery procedure unlisted         Home Medications:   metFORMIN 500 MG Oral Tab Take 1 tablet (500 mg total) by mouth 2 (two) times daily with meals.      metoprolol tartrate 50 MG Oral Tab Take 1 tablet (50 mg total) by mouth 2x Daily(Beta Blocker). 60  tablet 3    aspirin 81 MG Oral Tab EC Take 1 tablet (81 mg total) by mouth at bedtime.      rosuvastatin 10 MG Oral Tab Take 1 tablet (10 mg total) by mouth nightly.         Allergies:   Allergies[1]    Psychosocial History:  Social History     Socioeconomic History    Marital status:      Spouse name: Not on file    Number of children: Not on file    Years of education: Not on file    Highest education level: Not on file   Occupational History    Not on file   Tobacco Use    Smoking status: Never    Smokeless tobacco: Never   Vaping Use    Vaping status: Never Used   Substance and Sexual Activity    Alcohol use: Yes     Comment: OCCASIONAL    Drug use: Never    Sexual activity: Not on file   Other Topics Concern    Not on file   Social History Narrative    Not on file     Social Drivers of Health     Financial Resource Strain: Not on file   Food Insecurity: No Food Insecurity (6/11/2024)    Food Insecurity     Food Insecurity: Never true   Transportation Needs: No Transportation Needs (6/11/2024)    Transportation Needs     Lack of Transportation: No     Car Seat: Not on file   Physical Activity: Not on file   Stress: Not on file   Social Connections: Not on file   Housing Stability: Low Risk  (6/11/2024)    Housing Stability     Housing Instability: No     Housing Instability Emergency: Not on file     Crib or Bassinette: Not on file       Family Medical History:  Family History   Problem Relation Age of Onset    Breast Cancer Mother 70       Review of Systems:  A 10-point ROS was done with pertinent positives and negative per the HPI    Vital Signs:  Height: 169.9 cm (5' 6.89\") (01/14 0845)  Weight: 73.2 kg (161 lb 6.4 oz) (01/14 0845)  BSA (Calculated - sq m): 1.84 sq meters (01/14 0845)  Pulse: 75 (01/14 0845)  BP: 149/67 (01/14 0845)  Temp: 97 °F (36.1 °C) (01/14 0845)  Do Not Use - Resp Rate: --  SpO2: 97 % (01/14 0845)    Wt Readings from Last 6 Encounters:   01/14/25 73.2 kg (161 lb 6.4 oz)    12/31/24 71.7 kg (158 lb)   12/17/24 71.3 kg (157 lb 3.2 oz)   12/03/24 72.6 kg (160 lb)   11/26/24 70.8 kg (156 lb)   11/19/24 77.1 kg (169 lb 14.4 oz)       ECOG PS: 1    Physical Examination:  General: Patient is alert and oriented, not in acute distress  Psych: Mood and affect are appropriate  Eyes: EOMI, PERRL  ENT: Oropharynx is clear, no adenopathy  CV: no LE edema  Respiratory: non-labored respirations  GI/Abd: Soft, non-tender   Neurological: Grossly intact   Lymphatics: No palpable inguinal lymphadenopathy  Skin: no rashes or petechiae    Laboratory:  Lab Results   Component Value Date    WBC 6.9 01/14/2025    WBC 2.7 (L) 12/31/2024    WBC 3.1 (L) 12/17/2024    HGB 11.5 (L) 01/14/2025    HGB 11.7 (L) 12/31/2024    HGB 11.2 (L) 12/17/2024    HCT 35.6 01/14/2025    MCV 87.0 01/14/2025    MCH 28.1 01/14/2025    MCHC 32.3 01/14/2025    RDW 19.6 01/14/2025    .0 (L) 01/14/2025    .0 (L) 12/31/2024    .0 12/17/2024     Lab Results   Component Value Date     (H) 01/14/2025    BUN 11 01/14/2025    CREATSERUM 0.79 01/14/2025    CREATSERUM 0.89 12/31/2024    CREATSERUM 0.80 12/17/2024    ANIONGAP 10 01/14/2025    GFR 94 08/23/2017    CA 9.8 01/14/2025    OSMOCALC 295 01/14/2025    ALKPHO 90 01/14/2025    AST 38 (H) 01/14/2025    ALT 35 01/14/2025    BILT 0.4 01/14/2025    TP 6.5 01/14/2025    ALB 4.3 01/14/2025    GLOBULIN 2.2 01/14/2025     01/14/2025    K 4.2 01/14/2025     01/14/2025    CO2 22.0 01/14/2025     Lab Results   Component Value Date    PTT 23.7 06/11/2024    INR 0.09 (A) 11/15/2024     Impression & Plan:     Synchronous rectal and sigmoid colon adenocarcinoma  - T3bN0 on rectal MRI for rectal cancer, with adjacent sigmoid colon mass. Both adenocarcinoma, RENÉ  - reviewed at rectal tumor board; due to close proximity, separate surgeries cannot be performed  - we discussed that under ordinary circumstances, her rectal cancer in isolation would be treated with  total neoadjuvant therapy with neoadjuvant chemo-radiation followed by chemotherapy with a chance of not needing surgery. Her colon cancer in isolation would be treated with upfront resection followed by possible chemotherapy. However given that she has synchronous colon and rectal primaries, I recommended neoadjuvant FOLFOX which is highly active against both colon and rectal adenocarcinoma. This is supported by the FOXTROT study which found that 6 weeks of neoadjuvant FOLFOX in colon cancer increased pathologic downstaging, and the PROSPECT trial which found that neoadjuvant therapy with 3 months of FOLFOX with sphincter-sparing surgery was noninferior to the usage of neoadjuvant chemo-radiation.  - recommended neoadjuvant chemotherapy with 6 cycles of FOLFOX with subsequent restaging scans; and at that time we will evaluate if she should proceed to surgery then  - C5D1 FOLFOX today    Iron deficiency anemia  - s/p IV iron dextran 1000mg  - iron studies rechecked 1/14/25 with adequate iron stores    CAD  - s/p 3v CABG 6/2024  - on ASA 81mg  - Dr Saleem is her cardiologist    F/u: C6    Homero Carvalho  Hematology/Medical Oncology  [facility]             [1] No Known Allergies

## 2025-01-14 NOTE — PROGRESS NOTES
Pt here for C5D1 Drug(s) FOLFOX.  Arrives Ambulating independently, accompanied by Spouse     Patient was evaluated today by MD and Treatment Nurse.    Oral medications included in this regimen:  no    Patient confirms comprehension of cancer treatment schedule:  yes    Pregnancy screening:  Not applicable    Modifications in dose or schedule:  No    Medications appearance and physical integrity checked by RN: yes.    Chemotherapy IV pump settings verified by 2 RNs:  Yes.  Frequency of blood return and site check throughout administration: Prior to administration, Prior to each drug, Every 2-3 ml IVP, and At completion of therapy     Infusion/treatment outcome:  patient tolerated treatment without incident    Education Record    Learner:  Patient and Spouse  Barriers / Limitations:  None  Method:  Discussion  Education / instructions given:  plan of care, next appts  Outcome:  Shows understanding    Discharged Home, Ambulating independently, accompanied by:Spouse    Patient/family verbalized understanding of future appointments: by [employer] messaging

## 2025-01-14 NOTE — PROGRESS NOTES
Here for C5 FOLFOX. Feels great. Constipation resolved with warm prune juice. A few intermittent episodes of neuropathy in foot but they are gone.

## 2025-01-15 ENCOUNTER — TELEPHONE (OUTPATIENT)
Facility: CLINIC | Age: 70
End: 2025-01-15

## 2025-01-15 NOTE — PROGRESS NOTES
Oncology Nutrition F/U Consultation     Patient Name: Ilda Gutierrez  YOB: 1955  Medical Record Number: [id number]            Account Number: [id number]  Dietitian: Maria E Blevins RD, LDN     Date of visit: 1/14/25     Diet Rx: high protein/quality as tolerated; low residue pending diarrhea     Pertinent Dx/PMH: synchronous sigmoid and rectal adenocarcinoma      Past Medical History         Past Medical History:    Coronary atherosclerosis    Diabetes (HCC)    High blood pressure    High cholesterol    Hyperlipidemia            TX: FOLFOX     Other pertinent subjective/objective information: diet/medical hx     Pertinent Meds:     Medications - Current      Current Outpatient Medications:     prochlorperazine (COMPAZINE) 10 mg tablet, Take 1 tablet (10 mg total) by mouth every 6 (six) hours as needed for Nausea., Disp: 30 tablet, Rfl: 3    ondansetron (ZOFRAN) 8 MG tablet, Take 1 tablet (8 mg total) by mouth every 8 (eight) hours as needed for Nausea., Disp: 30 tablet, Rfl: 3    metFORMIN  MG Oral Tablet 24 Hr, Take 1 tablet (500 mg total) by mouth 2 (two) times daily with meals., Disp: 180 tablet, Rfl: 1    metoprolol tartrate 50 MG Oral Tab, Take 1 tablet (50 mg total) by mouth 2x Daily(Beta Blocker)., Disp: 60 tablet, Rfl: 3    aspirin 81 MG Oral Tab EC, Take 1 tablet (81 mg total) by mouth at bedtime., Disp: , Rfl:     rosuvastatin 10 MG Oral Tab, Take 1 tablet (10 mg total) by mouth nightly., Disp: , Rfl:          Pertinent Labs: noted     Height:  5'7\"             IBW: 135  +/- 10%     WT HX:   01/14/25 73.2 kg (161 lb 6.4 oz)   12/02/24 72.6 kg (160 lb)   11/19/24 77.1 kg (169 lb 14.4 oz)   11/07/24 73 kg (161 lb)   10/25/24 71.2 kg (157 lb)   09/30/24 73.6 kg (162 lb 3.2 oz)   09/12/24 72.2 kg (159 lb 3.2 oz)         Estimated Nutrition Needs: 23-27 kcals/kg = 8048-6307 KCALS/d; 1.4 gms protein/kg = 108 gms/d     Assessment/Plan: RD f/u w/ pt and spouse in tx room today. Pt noted  tolerating tx well and continues pushing protein intake. She noted enjoying making homemade soups.      RD offered support and will remain available on consult.      The 21st Century Cures Act makes medical notes like these available to patients in the interest of transparency. Please be advised this is a medical document. Medical documents are intended to carry relevant information, facts as evident, and the clinical opinion of the practitioner. The medical note is intended as peer to peer communication and may appear blunt or direct. It is written in medical language and may contain abbreviations or verbiage that are unfamiliar.

## 2025-01-15 NOTE — TELEPHONE ENCOUNTER
1/15/25 1ST letter sent to patient's home -cs    CT CHEST+ABDOMEN+PELVIS(ALL CNTRST ONLY)(TOK=[id number]) (Order #[id number]) on 10/29/24

## 2025-01-16 ENCOUNTER — OFFICE VISIT (OUTPATIENT)
Age: 70
End: 2025-01-16
Attending: STUDENT IN AN ORGANIZED HEALTH CARE EDUCATION/TRAINING PROGRAM
Payer: MEDICARE

## 2025-01-16 DIAGNOSIS — C20 RECTAL ADENOCARCINOMA (HCC): ICD-10-CM

## 2025-01-16 DIAGNOSIS — C18.7 ADENOCARCINOMA OF SIGMOID COLON (HCC): Primary | ICD-10-CM

## 2025-01-16 NOTE — PROGRESS NOTES
Education Record    Learner:  Patient    Disease / Diagnosis:Fulfila injection    Barriers / Limitations:  None   Comments:    Method:  Brief focused   Comments:    General Topics:  Diet, Infection, Medication, Pain, Precautions, Procedure, Side effects and symptom management, Plan of care reviewed, and Fall risk and prevention   Comments:    Outcome:  Shows understanding   Comments:

## 2025-01-27 RX ORDER — FLUOROURACIL 50 MG/ML
2400 INJECTION, SOLUTION INTRAVENOUS CONTINUOUS
Status: CANCELLED | OUTPATIENT
Start: 2025-01-28

## 2025-01-27 RX ORDER — FLUOROURACIL 50 MG/ML
400 INJECTION, SOLUTION INTRAVENOUS ONCE
Status: CANCELLED | OUTPATIENT
Start: 2025-01-28

## 2025-01-28 ENCOUNTER — OFFICE VISIT (OUTPATIENT)
Age: 70
End: 2025-01-28
Attending: STUDENT IN AN ORGANIZED HEALTH CARE EDUCATION/TRAINING PROGRAM
Payer: MEDICARE

## 2025-01-28 VITALS
SYSTOLIC BLOOD PRESSURE: 138 MMHG | HEIGHT: 66.89 IN | RESPIRATION RATE: 18 BRPM | DIASTOLIC BLOOD PRESSURE: 58 MMHG | OXYGEN SATURATION: 96 % | BODY MASS INDEX: 25.64 KG/M2 | WEIGHT: 163.38 LBS | HEART RATE: 78 BPM | TEMPERATURE: 98 F

## 2025-01-28 DIAGNOSIS — C18.7 ADENOCARCINOMA OF SIGMOID COLON (HCC): Primary | ICD-10-CM

## 2025-01-28 DIAGNOSIS — C20 RECTAL ADENOCARCINOMA (HCC): ICD-10-CM

## 2025-01-28 LAB
ALBUMIN SERPL-MCNC: 4.5 G/DL (ref 3.2–4.8)
ALBUMIN/GLOB SERPL: 2 {RATIO} (ref 1–2)
ALP LIVER SERPL-CCNC: 119 U/L
ALT SERPL-CCNC: 35 U/L
ANION GAP SERPL CALC-SCNC: 12 MMOL/L (ref 0–18)
AST SERPL-CCNC: 35 U/L (ref ?–34)
BASOPHILS # BLD AUTO: 0.11 X10(3) UL (ref 0–0.2)
BASOPHILS NFR BLD AUTO: 0.9 %
BILIRUB SERPL-MCNC: 0.5 MG/DL (ref 0.2–1.1)
BUN BLD-MCNC: 14 MG/DL (ref 9–23)
CALCIUM BLD-MCNC: 9.6 MG/DL (ref 8.7–10.6)
CEA SERPL-MCNC: 2.5 NG/ML (ref ?–5)
CHLORIDE SERPL-SCNC: 108 MMOL/L (ref 98–112)
CO2 SERPL-SCNC: 24 MMOL/L (ref 21–32)
CREAT BLD-MCNC: 0.9 MG/DL
EGFRCR SERPLBLD CKD-EPI 2021: 69 ML/MIN/1.73M2 (ref 60–?)
EOSINOPHIL # BLD AUTO: 0.2 X10(3) UL (ref 0–0.7)
EOSINOPHIL NFR BLD AUTO: 1.7 %
ERYTHROCYTE [DISTWIDTH] IN BLOOD BY AUTOMATED COUNT: 20.8 %
GLOBULIN PLAS-MCNC: 2.2 G/DL (ref 2–3.5)
GLUCOSE BLD-MCNC: 114 MG/DL (ref 70–99)
HCT VFR BLD AUTO: 36.5 %
HGB BLD-MCNC: 11.9 G/DL
IMM GRANULOCYTES # BLD AUTO: 0.21 X10(3) UL (ref 0–1)
IMM GRANULOCYTES NFR BLD: 1.8 %
LYMPHOCYTES # BLD AUTO: 1.91 X10(3) UL (ref 1–4)
LYMPHOCYTES NFR BLD AUTO: 16.4 %
MCH RBC QN AUTO: 28.7 PG (ref 26–34)
MCHC RBC AUTO-ENTMCNC: 32.6 G/DL (ref 31–37)
MCV RBC AUTO: 88 FL
MONOCYTES # BLD AUTO: 1.09 X10(3) UL (ref 0.1–1)
MONOCYTES NFR BLD AUTO: 9.4 %
NEUTROPHILS # BLD AUTO: 8.12 X10 (3) UL (ref 1.5–7.7)
NEUTROPHILS # BLD AUTO: 8.12 X10(3) UL (ref 1.5–7.7)
NEUTROPHILS NFR BLD AUTO: 69.8 %
OSMOLALITY SERPL CALC.SUM OF ELEC: 299 MOSM/KG (ref 275–295)
PLATELET # BLD AUTO: 137 10(3)UL (ref 150–450)
POTASSIUM SERPL-SCNC: 4.4 MMOL/L (ref 3.5–5.1)
PROT SERPL-MCNC: 6.7 G/DL (ref 5.7–8.2)
RBC # BLD AUTO: 4.15 X10(6)UL
SODIUM SERPL-SCNC: 144 MMOL/L (ref 136–145)
WBC # BLD AUTO: 11.6 X10(3) UL (ref 4–11)

## 2025-01-28 RX ORDER — FLUOROURACIL 50 MG/ML
400 INJECTION, SOLUTION INTRAVENOUS ONCE
Status: COMPLETED | OUTPATIENT
Start: 2025-01-28 | End: 2025-01-28

## 2025-01-28 RX ORDER — FLUOROURACIL 50 MG/ML
2400 INJECTION, SOLUTION INTRAVENOUS CONTINUOUS
Status: DISCONTINUED | OUTPATIENT
Start: 2025-01-28 | End: 2025-01-28

## 2025-01-28 RX ADMIN — FLUOROURACIL 750 MG: 50 INJECTION, SOLUTION INTRAVENOUS at 14:14:00

## 2025-01-28 RX ADMIN — FLUOROURACIL 4500 MG: 50 INJECTION, SOLUTION INTRAVENOUS at 14:20:00

## 2025-01-28 NOTE — PROGRESS NOTES
Hematology/Oncology Clinic Follow Up Visit    Patient Name: Ilda Gutierrez  Medical Record Number: [id number]    YOB: 1955   PCP: Kasey Mendenhall MD    Reason for Consultation:  Ilda Gutierrez was seen today for the diagnosis of synchronous sigmoid and rectal adenocarcinoma    Oncologic History:  9/30/24: visit noted to have blood in stool, iron deficiency anemia  10/29/24: s/p colonoscopy with sigmoid colon mass (at 21cm) and rectal mass (at 7-8cm) with path for both positive for moderately differentiated adenocarcinoma. RENÉ.  10/31/24: Rectal MRI reviewed at rectal tumor board; circumferential mass of upper to mid rectum measuring 6cm. T3bN0. Sigmoid mass adjacent to rectal mass. Separate surgeries cannot be performed.  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    - here with   - she is here for her last cycle of neoadjuvant FOLFOX  - tolerating chemo well, no issues. She has no neuropathy  - No blood in stool  - appetite/weight stable    Past Medical History:  Past Medical History:    Coronary atherosclerosis    Diabetes (HCC)    High blood pressure    High cholesterol    Hyperlipidemia     Past Surgical History:   Procedure Laterality Date    Cabg  2024    [facility] , Triple bypass    Colonoscopy N/A 10/29/2024    Dr. Anderson; polyps, sigmoid mass, rectal mass, diverticulosis    Colonoscopy N/A 10/29/2024    Procedure: COLONOSCOPY;  Surgeon: Toni Anderson MD;  Location: [facility] ENDOSCOPY    Egd N/A 10/29/2024    Dr. Anderson; hiatal hernia    Spine surgery procedure unlisted         Home Medications:  No outpatient medications have been marked as taking for the 1/28/25 encounter (Office Visit) with Homero Carvalho MD.        Allergies:   Allergies[1]    Psychosocial History:  Social History     Socioeconomic History    Marital status:      Spouse name: Not on file    Number of children: Not on file    Years of education: Not on file    Highest education level: Not on file   Occupational History    Not on file   Tobacco Use    Smoking status: Never    Smokeless tobacco: Never   Vaping Use    Vaping status: Never Used   Substance and Sexual Activity    Alcohol use: Yes     Comment: OCCASIONAL    Drug use: Never    Sexual activity: Not on file   Other Topics Concern    Not on file   Social History Narrative    Not on file     Social Drivers of Health     Financial Resource Strain: Not on file   Food Insecurity: No Food Insecurity (6/11/2024)    Food Insecurity     Food Insecurity: Never true   Transportation Needs: No Transportation Needs (6/11/2024)    Transportation Needs     Lack of Transportation: No     Car Seat: Not on file   Physical Activity: Not on file   Stress: Not on file   Social Connections: Not on file   Housing Stability: Low Risk  (6/11/2024)    Housing Stability     Housing Instability: No     Housing Instability Emergency: Not on file     Crib or Bassinette: Not on file       Family Medical History:  Family History   Problem Relation Age of Onset    Breast Cancer Mother 70       Review of Systems:  A 10-point ROS was done with pertinent positives and negative per the HPI    Vital Signs:  Height: 169.9 cm (5' 6.89\") (01/28 1026)  Weight: 74.1 kg (163 lb 6.4 oz) (01/28 1026)  BSA (Calculated - sq m): 1.85 sq meters (01/28 1026)  Pulse: 78 (01/28 1026)  BP: 138/58 (01/28 1026)  Temp: 97.7 °F (36.5 °C) (01/28 1026)  Do Not Use - Resp Rate: --  SpO2: 96 % (01/28 1026)    Wt Readings from Last 6 Encounters:   01/28/25 74.1 kg (163 lb 6.4 oz)   01/14/25 73.2 kg (161 lb 6.4 oz)   12/31/24 71.7 kg (158 lb)   12/17/24 71.3 kg (157 lb 3.2 oz)   12/03/24 72.6 kg (160 lb)   11/26/24 70.8 kg (156 lb)       ECOG PS:  1    Physical Examination:  General: Patient is alert and oriented, not in acute distress  Psych: Mood and affect are appropriate  Eyes: EOMI, PERRL  ENT: Oropharynx is clear, no adenopathy  CV: no LE edema  Respiratory: non-labored respirations  GI/Abd: Soft, non-tender   Neurological: Grossly intact   Lymphatics: No palpable inguinal lymphadenopathy  Skin: no rashes or petechiae    Laboratory:  Lab Results   Component Value Date    WBC 11.6 (H) 01/28/2025    WBC 6.9 01/14/2025    WBC 2.7 (L) 12/31/2024    HGB 11.9 (L) 01/28/2025    HGB 11.5 (L) 01/14/2025    HGB 11.7 (L) 12/31/2024    HCT 36.5 01/28/2025    MCV 88.0 01/28/2025    MCH 28.7 01/28/2025    MCHC 32.6 01/28/2025    RDW 20.8 01/28/2025    .0 (L) 01/28/2025    .0 (L) 01/14/2025    .0 (L) 12/31/2024     Lab Results   Component Value Date     (H) 01/28/2025    BUN 14 01/28/2025    CREATSERUM 0.90 01/28/2025    CREATSERUM 0.79 01/14/2025    CREATSERUM 0.89 12/31/2024    ANIONGAP 12 01/28/2025    GFR 94 08/23/2017    CA 9.6 01/28/2025    OSMOCALC 299 (H) 01/28/2025    ALKPHO 119 01/28/2025    AST 35 (H) 01/28/2025    ALT 35 01/28/2025    BILT 0.5 01/28/2025    TP 6.7 01/28/2025    ALB 4.5 01/28/2025    GLOBULIN 2.2 01/28/2025     01/28/2025    K 4.4 01/28/2025     01/28/2025    CO2 24.0 01/28/2025     Lab Results   Component Value Date    PTT 23.7 06/11/2024    INR 0.09 (A) 11/15/2024     Impression & Plan:     Synchronous rectal and sigmoid colon adenocarcinoma  - T3bN0 on rectal MRI for rectal cancer, with adjacent sigmoid colon mass. Both adenocarcinoma, RENÉ  - reviewed at rectal tumor board; due to close proximity, separate surgeries cannot be performed  - we discussed that under ordinary circumstances, her rectal cancer in isolation would be treated with total neoadjuvant therapy with neoadjuvant chemo-radiation followed by chemotherapy with a chance of not needing surgery. Her colon cancer in isolation would be  treated with upfront resection followed by possible chemotherapy. However given that she has synchronous colon and rectal primaries, I recommended neoadjuvant FOLFOX which is highly active against both colon and rectal adenocarcinoma. This is supported by the FOXTROT study which found that 6 weeks of neoadjuvant FOLFOX in colon cancer increased pathologic downstaging, and the PROSPECT trial which found that neoadjuvant therapy with 3 months of FOLFOX with sphincter-sparing surgery was noninferior to the usage of neoadjuvant chemo-radiation.  - recommended neoadjuvant chemotherapy with 6 cycles of FOLFOX with subsequent restaging scans; and at that time we will evaluate if she should proceed to surgery then  - C6D1 FOLFOX today; this is her last neoadjuvant cycle  - CT and MRI scheduled for 2/13/25  - to see colorectal surgery to discuss resection  - f/u with me 3-4 weeks after surgery to f/u final path    Iron deficiency anemia  - s/p IV iron dextran 1000mg  - iron studies rechecked 1/14/25 with adequate iron stores    CAD  - s/p 3v CABG 6/2024  - on ASA 81mg  - Dr Saleem is her cardiologist    F/u: 3-4 weeks after surgery    Homero Carvalho MD  [facility] Hematology Oncology           [1] No Known Allergies

## 2025-01-28 NOTE — PROGRESS NOTES
Outpatient Oncology Care Plan  Problem list:  fatigue    Problems related to:    chemotherapy  disease/disease progression  side effect of treatment    Interventions:  promoted rest  provided general teaching    Expected outcomes:  symptoms relieved/minimized  understands plan of care    Progress towards outcome:  making progress    Education Record    Learner:  Patient  Barriers / Limitations:  None  Method:  Brief focused and Reinforcement  Outcome:  Shows understanding  Comments:    Patient here for C6D1 FOLFOX. Neuropathy and constipation have resolved. Denies N/V/D/SOB.

## 2025-01-28 NOTE — PROGRESS NOTES
Pt here for C 6 D 1 Drug(s)FOLFOX.  Arrives Ambulating independently, accompanied by Self and Spouse     Patient was evaluated today by MD.    Oral medications included in this regimen:  no    Patient confirms comprehension of cancer treatment schedule:  yes    Pregnancy screening:  Not applicable    Modifications in dose or schedule:  No    Medications appearance and physical integrity checked by RN: yes.    Chemotherapy IV pump settings verified by 2 RNs:  Yes.  Frequency of blood return and site check throughout administration: Prior to administration, Prior to each drug, and At completion of therapy     Infusion/treatment outcome:  patient tolerated treatment without incident    Education Record    Learner:  Patient and Spouse  Barriers / Limitations:  None  Method:  Discussion  Education / instructions given:    Outcome:  Shows understanding    Discharged Home, Ambulating independently, accompanied by:Self and Spouse    Patient/family verbalized understanding of future appointments: by [email or web address] messaging  No further appointments needed at this time per Dr. Carvalho as patient will be having surgery.

## 2025-01-29 ENCOUNTER — TELEPHONE (OUTPATIENT)
Facility: LOCATION | Age: 70
End: 2025-01-29

## 2025-01-29 NOTE — TELEPHONE ENCOUNTER
Poke with patient to make appointment.  Future Appointments   Date Time Provider Department Center                        2/17/2025  2:00 PM Hector Galan MD [id number]

## 2025-01-30 ENCOUNTER — OFFICE VISIT (OUTPATIENT)
Age: 70
End: 2025-01-30
Attending: STUDENT IN AN ORGANIZED HEALTH CARE EDUCATION/TRAINING PROGRAM
Payer: MEDICARE

## 2025-01-30 DIAGNOSIS — C18.7 ADENOCARCINOMA OF SIGMOID COLON (HCC): Primary | ICD-10-CM

## 2025-01-30 DIAGNOSIS — C20 RECTAL ADENOCARCINOMA (HCC): ICD-10-CM

## 2025-01-30 NOTE — PROGRESS NOTES
Per dr vital, patient does not need fulphila today.      Education Record    Learner:  Patient    Disease / Diagnosis: Colon Cancer    Barriers / Limitations:  None   Comments:    Method:  Brief focused   Comments:    General Topics:  Plan of care reviewed   Comments:    Outcome:  Shows understanding   Comments:

## 2025-02-13 ENCOUNTER — HOSPITAL ENCOUNTER (OUTPATIENT)
Dept: MRI IMAGING | Facility: HOSPITAL | Age: 70
Discharge: HOME | End: 2025-02-13
Attending: INTERNAL MEDICINE
Payer: MEDICARE

## 2025-02-13 ENCOUNTER — HOSPITAL ENCOUNTER (OUTPATIENT)
Dept: CT IMAGING | Facility: HOSPITAL | Age: 70
Discharge: HOME | End: 2025-02-13
Attending: INTERNAL MEDICINE
Payer: MEDICARE

## 2025-02-13 DIAGNOSIS — C18.7 ADENOCARCINOMA OF SIGMOID COLON (HCC): ICD-10-CM

## 2025-02-13 DIAGNOSIS — C20 RECTAL ADENOCARCINOMA (HCC): ICD-10-CM

## 2025-02-13 PROCEDURE — 74177 CT ABD & PELVIS W/CONTRAST: CPT | Performed by: INTERNAL MEDICINE

## 2025-02-13 PROCEDURE — 71260 CT THORAX DX C+: CPT | Performed by: INTERNAL MEDICINE

## 2025-02-13 PROCEDURE — A9575 INJ GADOTERATE MEGLUMI 0.1ML: HCPCS

## 2025-02-13 PROCEDURE — 72197 MRI PELVIS W/O & W/DYE: CPT | Performed by: INTERNAL MEDICINE

## 2025-02-13 RX ORDER — GADOTERATE MEGLUMINE 376.9 MG/ML
15 INJECTION INTRAVENOUS
Status: COMPLETED | OUTPATIENT
Start: 2025-02-13 | End: 2025-02-13

## 2025-02-13 RX ADMIN — GADOTERATE MEGLUMINE 15 ML: 376.9 INJECTION INTRAVENOUS at 11:55:00

## 2025-02-17 ENCOUNTER — OFFICE VISIT (OUTPATIENT)
Facility: LOCATION | Age: 70
End: 2025-02-17
Payer: MEDICARE

## 2025-02-17 ENCOUNTER — TELEPHONE (OUTPATIENT)
Facility: LOCATION | Age: 70
End: 2025-02-17

## 2025-02-17 VITALS
WEIGHT: 163 LBS | HEART RATE: 85 BPM | BODY MASS INDEX: 25.58 KG/M2 | SYSTOLIC BLOOD PRESSURE: 143 MMHG | OXYGEN SATURATION: 97 % | TEMPERATURE: 97 F | HEIGHT: 66.89 IN | DIASTOLIC BLOOD PRESSURE: 80 MMHG

## 2025-02-17 DIAGNOSIS — C20 RECTAL CANCER (HCC): Primary | ICD-10-CM

## 2025-02-17 DIAGNOSIS — C18.9 MALIGNANT NEOPLASM OF COLON, UNSPECIFIED PART OF COLON (HCC): ICD-10-CM

## 2025-02-17 NOTE — TELEPHONE ENCOUNTER
KAYY COLEMAN Patient  Member ID  [id number]    Date of Birth  1955-04-12    Gender  Female    Eligibility Status  Active Coverage    Group Number  [id number]    Plan / Coverage Date  2025-01-01    Transaction Type  Outpatient Authorization/Referral    Organization  [facility]    Payer  [employer]    [employer] logo     Certificate Information  Reference Number  NA    Status  NO ACTION REQUIRED    [employer] Record Number  [id number]    Message  For Member contracts which cover this service, no prior authorization is necessary. Please contact [employer] at the number on the back of the Member ID card.  Member Information  Patient Name  KAYY COLEMAN    Patient Date of Birth  1955-04-12    Patient Gender  Female    Member ID  [id number]    Relationship to Subscriber  Self    Subscriber Name  KAYY COLEMAN    Requesting Provider     Name  ARMIN ENINS  [id number]    Specialty  [id number]    Provider Role  Provider    Address  [address] [place] [address]    Phone  [phone]    Fax  [phone]    Contact Name  SRINIVASAN SOTELO    Service Information  Service Type  2 - Surgical    Place of Service  22 - On [facility]    Service From - To Date  2025-02-25 - 2025-05-30    Quantity  1 Units  Procedure Code 1 (CPT/HCPCS)  63324 - DIAGNOSTIC SIGMOIDOSCOPY    Quantity  1 Units

## 2025-02-19 ENCOUNTER — TELEPHONE (OUTPATIENT)
Facility: LOCATION | Age: 70
End: 2025-02-19

## 2025-02-25 ENCOUNTER — HOSPITAL ENCOUNTER (OUTPATIENT)
Facility: HOSPITAL | Age: 70
Setting detail: HOSPITAL OUTPATIENT SURGERY
Discharge: HOME | End: 2025-02-25
Attending: STUDENT IN AN ORGANIZED HEALTH CARE EDUCATION/TRAINING PROGRAM | Admitting: STUDENT IN AN ORGANIZED HEALTH CARE EDUCATION/TRAINING PROGRAM
Payer: MEDICARE

## 2025-02-25 ENCOUNTER — ANESTHESIA (OUTPATIENT)
Dept: ENDOSCOPY | Facility: HOSPITAL | Age: 70
End: 2025-02-25
Payer: MEDICARE

## 2025-02-25 ENCOUNTER — ANESTHESIA EVENT (OUTPATIENT)
Dept: ENDOSCOPY | Facility: HOSPITAL | Age: 70
End: 2025-02-25
Payer: MEDICARE

## 2025-02-25 VITALS
WEIGHT: 163 LBS | BODY MASS INDEX: 25.58 KG/M2 | OXYGEN SATURATION: 100 % | HEIGHT: 66.75 IN | DIASTOLIC BLOOD PRESSURE: 72 MMHG | TEMPERATURE: 99 F | SYSTOLIC BLOOD PRESSURE: 156 MMHG | HEART RATE: 78 BPM | RESPIRATION RATE: 16 BRPM

## 2025-02-25 PROBLEM — C18.7 CANCER OF SIGMOID COLON (HCC): Status: ACTIVE | Noted: 2025-02-25

## 2025-02-25 PROBLEM — C20 RECTAL CANCER (HCC): Status: ACTIVE | Noted: 2025-02-25

## 2025-02-25 LAB — GLUCOSE BLD-MCNC: 112 MG/DL (ref 70–99)

## 2025-02-25 PROCEDURE — 0DJD8ZZ INSPECTION OF LOWER INTESTINAL TRACT, VIA NATURAL OR ARTIFICIAL OPENING ENDOSCOPIC: ICD-10-PCS | Performed by: STUDENT IN AN ORGANIZED HEALTH CARE EDUCATION/TRAINING PROGRAM

## 2025-02-25 PROCEDURE — 45330 DIAGNOSTIC SIGMOIDOSCOPY: CPT | Performed by: STUDENT IN AN ORGANIZED HEALTH CARE EDUCATION/TRAINING PROGRAM

## 2025-02-25 RX ORDER — DEXTROSE MONOHYDRATE 25 G/50ML
50 INJECTION, SOLUTION INTRAVENOUS
Status: DISCONTINUED | OUTPATIENT
Start: 2025-02-25 | End: 2025-02-25

## 2025-02-25 RX ORDER — SODIUM CHLORIDE, SODIUM LACTATE, POTASSIUM CHLORIDE, CALCIUM CHLORIDE 600; 310; 30; 20 MG/100ML; MG/100ML; MG/100ML; MG/100ML
INJECTION, SOLUTION INTRAVENOUS CONTINUOUS
Status: DISCONTINUED | OUTPATIENT
Start: 2025-02-25 | End: 2025-02-25

## 2025-02-25 RX ORDER — NICOTINE POLACRILEX 4 MG
15 LOZENGE BUCCAL
Status: DISCONTINUED | OUTPATIENT
Start: 2025-02-25 | End: 2025-02-25

## 2025-02-25 RX ORDER — NICOTINE POLACRILEX 4 MG
30 LOZENGE BUCCAL
Status: DISCONTINUED | OUTPATIENT
Start: 2025-02-25 | End: 2025-02-25

## 2025-02-25 RX ORDER — LIDOCAINE HYDROCHLORIDE 10 MG/ML
INJECTION, SOLUTION EPIDURAL; INFILTRATION; INTRACAUDAL; PERINEURAL AS NEEDED
Status: DISCONTINUED | OUTPATIENT
Start: 2025-02-25 | End: 2025-02-25 | Stop reason: SURG

## 2025-02-25 RX ADMIN — LIDOCAINE HYDROCHLORIDE 50 MG: 10 INJECTION, SOLUTION EPIDURAL; INFILTRATION; INTRACAUDAL; PERINEURAL at 12:31:00

## 2025-02-25 NOTE — ANESTHESIA POSTPROCEDURE EVALUATION
[facility]    Ilda Gutierrez Patient Status:  Hospital Outpatient Surgery   Age/Gender 69 year old female MRN [id number]   Location [facility] ENDOSCOPY PAIN CENTER Attending Hector Galan MD   Hosp Day # 0 PCP Kasey Mendenhall MD       Anesthesia Post-op Note    FLEXIBLE SIGMOIDOSCOPY    Procedure Summary       Date: 02/25/25 Room / Location:  ENDOSCOPY 04 / [facility] ENDOSCOPY    Anesthesia Start: 1228 Anesthesia Stop: 1254    Procedure: FLEXIBLE SIGMOIDOSCOPY Diagnosis:       Rectal cancer (HCC)      Malignant neoplasm of colon, unspecified part of colon (HCC)      (hx colon cancer/ rectal cancer)    Surgeons: Hector Galan MD Anesthesiologist: Hever Garrison MD    Anesthesia Type: MAC ASA Status: 3            Anesthesia Type: MAC    Vitals Value Taken Time   /72 02/25/25 1310   Temp 98 02/25/25 1317   Pulse 78 02/25/25 1309   Resp 18 02/25/25 1317   SpO2 100 % 02/25/25 1309   Vitals shown include unfiled device data.        Patient Location: Endoscopy    Anesthesia Type: MAC    Airway Patency: patent and extubated    Postop Pain Control: adequate    Mental Status: mildly sedated but able to meaningfully participate in the post-anesthesia evaluation    Nausea/Vomiting: none    Cardiopulmonary/Hydration status: stable euvolemic    Complications: no apparent anesthesia related complications    Postop vital signs: stable    Dental Exam: Unchanged from Preop    Patient to be discharged from PACU when criteria met.

## 2025-02-25 NOTE — INTERVAL H&P NOTE
Pre-op Diagnosis: Rectal cancer (HCC) [C20]  Malignant neoplasm of colon, unspecified part of colon (HCC) [C18.9]    The above referenced H&P was reviewed by Hector Galan MD on 2/25/2025, the patient was examined and no significant changes have occurred in the patient's condition since the H&P was performed.  I discussed with the patient and/or legal representative the potential benefits, risks and side effects of this procedure; the likelihood of the patient achieving goals; and potential problems that might occur during recuperation.  I discussed reasonable alternatives to the procedure, including risks, benefits and side effects related to the alternatives and risks related to not receiving this procedure.  We will proceed with procedure as planned.

## 2025-02-25 NOTE — OPERATIVE REPORT
[facility]  Operative Note    Ilda Gutierrez Location: OR   [facility] [id number] MRN [id number]    1955 Age 69 year old   Admission Date 2025 Operation Date 2025   Attending Physician No att. providers found Operating Physician Hector Galan MD   PCP Kasey Mendenhall MD          Patient Name: Ilda Gutierrez    Preoperative Diagnosis: Rectal cancer (HCC) [C20]  Malignant neoplasm of colon, unspecified part of colon (HCC) [C18.9]    Postoperative Diagnosis:   Rectal cancer  Sigmoid colon cancer    Primary Surgeon: Hector Galan MD    Anesthesia: MAC    Procedures: Flexible sigmoidoscopy    Specimen:   None    Estimated Blood Loss: None    Complications: None immediate    Condition: Good    Indications for Surgery:   This is a very nice 69-year-old female with a history of synchronous rectal and sigmoid colon cancer.     Patient had intermittent rectal bleeding in the summer .  She was noted to have iron deficiency anemia.  She underwent a colonoscopy with Dr. Anderson on 10/29/2024.  She was found to have synchronous, short, friable, ulcerated apple core lesions in the distal sigmoid at 21 cm from the anal verge and in the proximal rectum at 7-8 cm from the anal verge.  Each lesion was biopsied.  A tattoo was placed distal to the sigmoid lesion.  Dr. Anderson comments that the rectal lesion was palpable on digital rectal exam.  Pathology from the sigmoid colon mass and rectal mass biopsies both showed infiltrating moderately differentiated adenocarcinoma, microsatellite stable.     Patient underwent a staging CT scan of the chest, abdomen and pelvis on 2024 that was negative for any obvious metastatic disease though showed bilateral micronodules in both lungs.  Patient underwent an MRI pelvis for local staging of the rectal cancer which showed a T3b N0 circumferential tumor approximately 8 cm from the anal verge.  Patient has completed 6 cycles of FOLFOX, last cycle on  1/28/2024.  Posttreatment MRI was performed on 2/13/2025 and showed a treatment related inflammation/fibrosis at the site of previously identified rectal tumor without any residual tumor signal identified.     Patient is doing very well at this time.  She is no longer having any rectal bleeding.  She is having regular bowel movements.  She denies any abdominal pain, nausea, vomiting or weight loss.  No prior abdominal or anorectal surgery.  No family history of colon or rectal cancer.  She has had 4 vaginal deliveries and denies any fecal incontinence.     Patient is well-appearing on exam today.  I do not appreciate any obvious residual rectal mass on bedside digital rectal exam.     I recommend planning for a flexible sigmoidoscopy to rule out any residual rectal mass and also to possibly tattoo the area of the rectal scar for possible surgical planning purposes.  I recommend a 2 enema prep.  The details of the procedure were discussed including the prep instructions, risks, benefits and alternatives.  Patient expressed understanding and was agreeable to schedule a flexible sigmoidoscopy procedure with me.  This has been scheduled for 2/25/2025 at [facility] under monitored anesthesia care.    Patient presents for the flexible sigmoidoscopy procedure today.  Consent was signed.  All questions answered.    Surgical Findings:   The quality of the bowel prep was poor.  There was a large amount of solid stool throughout the proximal rectum and sigmoid colon.  There was a tattoo identified within the sigmoid colon approximately 25 cm from the anal verge.  No obvious mass was identified near the tattoo though there was a large amount of solid stool in this area which greatly limited visualization.  There was a residual friable mass on the mid rectal valve approximately 8 cm from the anal verge this was located along the right anterior side of the rectal wall.    Description of Procedure:   The patient was taken to  the endoscopy suite and positioned in the left lateral decubitus position with knees flexed. Monitored anesthesia care was administered. A time-out was performed.    The perineum and perianal skin were examined. A digital rectal examination was performed. These were initially normal. A well-lubricated pediatric colonoscope was then inserted and carefully navigated to the sigmoid colon. CO2 insufflation was used throughout the procedure. Advancement was accomplished with ease. The scope was then withdrawn as the mucosa was circumferentially examined.     The quality of the bowel prep was poor.  There was a large amount of solid stool throughout the proximal rectum and sigmoid colon.  There was a tattoo identified within the sigmoid colon approximately 25 cm from the anal verge.  No obvious mass was identified near the tattoo though there was a large amount of solid stool in this area which greatly limited visualization.    There was a residual friable mass on the mid rectal valve approximately 8 cm from the anal verge this was located along the right anterior side of the rectal wall. The scope was straightened and excess gas was suctioned from the colon and the scope removed, terminating the procedure.  I repeated a digital rectal exam and confirmed that this mass was palpable at the very top of my index finger around 8 cm from the anal verge.    Anesthesia was terminated and the patient transported to the recovery unit in good condition. The patient tolerated the procedure well without apparent intraoperative complication.    Follow up:   I will discuss patient's case with Dr. Carvalho and the cancer treatment team at multidisciplinary rectal cancer tumor board.  Preliminary plan is to proceed with low anterior resection to remove both the mid rectal mass and sigmoid colon mass      Hector Galan MD  2/25/2025  1:23 PM

## 2025-02-25 NOTE — DISCHARGE INSTRUCTIONS
Home Care Instructions for FLEXIBLE SIGMOIDOSCOPY  with Sedation    Diet:  - Resume your regular diet as tolerated unless otherwise instructed.  - Start with light meals to minimize bloating.  - Do not drink alcohol today.    Medication:  - If you have questions about resuming your normal medications, please contact your Primary Care Physician.    Activities:  - Take it easy today. Do not return to work today.  - Do not drive today.  - Do not operate any machinery today (including kitchen equipment).    FLEXIBLE SIGMOIDOSCOPY:  - You may notice some rectal \"spotting\" (a little blood on the toilet tissue) for a day or two after the exam. This is normal.  - If you experience any rectal bleeding (not spotting), persistent tenderness or sharp severe abdominal pains, oral temperature over 100 degrees Fahrenheit, light-headedness or dizziness, or any other problems, contact your doctor.    **If unable to reach your doctor, please go to the [facility] Emergency Room**    - Your referring physician will receive a full report of your examination.  - If you do not hear from your doctor's office within two weeks of your biopsy, please call them for your results.

## 2025-02-25 NOTE — H&P (VIEW-ONLY)
New Patient Visit Note       Active Problems      1. Rectal cancer (HCC)    2. Malignant neoplasm of colon, unspecified part of colon (HCC)        Chief Complaint   Chief Complaint   Patient presents with    Follow - Up     EP - CT and MRI 2/13.Discuss surgical options. 30mins per Dr. Galan, no symptoms.         History of Present Illness   This is a very nice 69-year-old female with a history of synchronous rectal and sigmoid colon cancer.    Patient had intermittent rectal bleeding in the summer 2024.  She was noted to have iron deficiency anemia.  She underwent a colonoscopy with Dr. Anderson on 10/29/2024.  She was found to have synchronous, short, friable, ulcerated apple core lesions in the distal sigmoid at 21 cm from the anal verge and in the proximal rectum at 7-8 cm from the anal verge.  Each lesion was biopsied.  A tattoo was placed distal to the sigmoid lesion.  Dr. Anderson comments that the rectal lesion was palpable on digital rectal exam.  Pathology from the sigmoid colon mass and rectal mass biopsies both showed infiltrating moderately differentiated adenocarcinoma, microsatellite stable.    Patient underwent a staging CT scan of the chest, abdomen and pelvis on 11/6/2024 that was negative for any obvious metastatic disease though showed bilateral micronodules in both lungs.  Patient underwent an MRI pelvis for local staging of the rectal cancer which showed a T3b N0 circumferential tumor approximately 8 cm from the anal verge.  Patient has completed 6 cycles of FOLFOX, last cycle on 1/28/2024.  Posttreatment MRI was performed on 2/13/2025 and showed a treatment related inflammation/fibrosis at the site of previously identified rectal tumor without any residual tumor signal identified.    Patient is doing very well at this time.  She is no longer having any rectal bleeding.  She is having regular bowel movements.  She denies any abdominal pain, nausea, vomiting or weight loss.  No prior  abdominal or anorectal surgery.  No family history of colon or rectal cancer.  She has had 4 vaginal deliveries and denies any fecal incontinence.    Allergies  Ilda has No Known Allergies.    Past Medical / Surgical / Social / Family History    The past medical and past surgical history have been reviewed by me today.    Past Medical History:    Cancer (HCC)    Colon cancer (HCC)    Coronary atherosclerosis    Diabetes (HCC)    High blood pressure    High cholesterol    Hyperlipidemia     Past Surgical History:   Procedure Laterality Date    Cabg      [facility] , Triple bypass    Colonoscopy N/A 10/29/2024    Dr. Anderson; polyps, sigmoid mass, rectal mass, diverticulosis    Colonoscopy N/A 10/29/2024    Procedure: COLONOSCOPY;  Surgeon: Toni Anderson MD;  Location: [facility] ENDOSCOPY    Egd N/A 10/29/2024    Dr. Anderson; hiatal hernia      82    4th child    Spine surgery procedure unlisted         The family history and social history have been reviewed by me today.    Family History   Problem Relation Age of Onset    Breast Cancer Mother 70     Social History     Socioeconomic History    Marital status:    Tobacco Use    Smoking status: Never    Smokeless tobacco: Never   Vaping Use    Vaping status: Never Used   Substance and Sexual Activity    Alcohol use: Yes     Alcohol/week: 1.0 standard drink of alcohol     Types: 1 Glasses of wine per week     Comment: OCCASIONAL    Drug use: Never   Other Topics Concern    Caffeine Concern No    Exercise No    Seat Belt No    Special Diet No    Stress Concern No    Weight Concern No        Current Outpatient Medications:     metFORMIN 500 MG Oral Tab, Take 1 tablet (500 mg total) by mouth 2 (two) times daily with meals., Disp: , Rfl:     metoprolol tartrate 50 MG Oral Tab, Take 1 tablet (50 mg total) by mouth 2x Daily(Beta Blocker)., Disp: 60 tablet, Rfl: 3    aspirin 81 MG Oral Tab EC, Take 1 tablet (81 mg total) by mouth at  bedtime., Disp: , Rfl:     rosuvastatin 10 MG Oral Tab, Take 1 tablet (10 mg total) by mouth nightly., Disp: , Rfl:     prochlorperazine (COMPAZINE) 10 mg tablet, Take 1 tablet (10 mg total) by mouth every 6 (six) hours as needed for Nausea., Disp: 30 tablet, Rfl: 3    ondansetron (ZOFRAN) 8 MG tablet, Take 1 tablet (8 mg total) by mouth every 8 (eight) hours as needed for Nausea., Disp: 30 tablet, Rfl: 3      Review of Systems  A 10 point review of systems was performed and negative unless otherwise documented per HPI.    Physical Findings   /80 (BP Location: Left arm, Patient Position: Sitting, Cuff Size: adult)   Pulse 85   Temp 97.3 °F (36.3 °C) (Temporal)   Ht 66.89\"   Wt 163 lb (73.9 kg)   SpO2 97%   BMI 25.61 kg/m²   Physical Exam  Vitals and nursing note reviewed. Exam conducted with a chaperone present.   Constitutional:       General: She is not in acute distress.  HENT:      Head: Normocephalic and atraumatic.      Mouth/Throat:      Mouth: Mucous membranes are moist.   Cardiovascular:      Rate and Rhythm: Normal rate and regular rhythm.   Pulmonary:      Effort: Pulmonary effort is normal.   Abdominal:      General: There is no distension.      Palpations: Abdomen is soft.      Tenderness: There is no abdominal tenderness.   Genitourinary:     Comments: Patient examined in the prone jackknife position with medical assistant chaperone present.  External exam of the anus is normal.  Digital rectal exam shows fair tone and good squeeze without any palpable masses, bleeding, drainage or tenderness.  Musculoskeletal:         General: No deformity.   Skin:     General: Skin is warm and dry.   Neurological:      General: No focal deficit present.      Mental Status: She is alert.   Psychiatric:         Mood and Affect: Mood normal.     I have personally reviewed the imaging of patient's recent CT scans of the abdomen and pelvis and MRI pelvis.       Assessment   1. Rectal cancer (HCC)    2.  Malignant neoplasm of colon, unspecified part of colon (HCC)        This is a very nice 69-year-old female with a history of synchronous rectal and sigmoid colon cancer.    Patient had intermittent rectal bleeding in the summer 2024.  She was noted to have iron deficiency anemia.  She underwent a colonoscopy with Dr. Anderson on 10/29/2024.  She was found to have synchronous, short, friable, ulcerated apple core lesions in the distal sigmoid at 21 cm from the anal verge and in the proximal rectum at 7-8 cm from the anal verge.  Each lesion was biopsied.  A tattoo was placed distal to the sigmoid lesion.  Dr. Anderson comments that the rectal lesion was palpable on digital rectal exam.  Pathology from the sigmoid colon mass and rectal mass biopsies both showed infiltrating moderately differentiated adenocarcinoma, microsatellite stable.    Patient underwent a staging CT scan of the chest, abdomen and pelvis on 11/6/2024 that was negative for any obvious metastatic disease though showed bilateral micronodules in both lungs.  Patient underwent an MRI pelvis for local staging of the rectal cancer which showed a T3b N0 circumferential tumor approximately 8 cm from the anal verge.  Patient has completed 6 cycles of FOLFOX, last cycle on 1/28/2024.  Posttreatment MRI was performed on 2/13/2025 and showed a treatment related inflammation/fibrosis at the site of previously identified rectal tumor without any residual tumor signal identified.    Patient is doing very well at this time.  She is no longer having any rectal bleeding.  She is having regular bowel movements.  She denies any abdominal pain, nausea, vomiting or weight loss.  No prior abdominal or anorectal surgery.  No family history of colon or rectal cancer.  She has had 4 vaginal deliveries and denies any fecal incontinence.    Patient is well-appearing on exam today.  I do not appreciate any obvious residual rectal mass on bedside digital rectal  exam.    Plan  I recommend planning for a flexible sigmoidoscopy to rule out any residual rectal mass and also to possibly tattoo the area of the rectal scar for possible surgical planning purposes.  I recommend a 2 enema prep.  The details of the procedure were discussed including the prep instructions, risks, benefits and alternatives.  Patient expressed understanding and was agreeable to schedule a flexible sigmoidoscopy procedure with me.  This has been scheduled for 2/25/2025 at [facility] under monitored anesthesia care.     No orders of the defined types were placed in this encounter.      Imaging & Referrals   None    Follow Up  No follow-ups on file.    Hector Galan MD

## 2025-02-25 NOTE — H&P
New Patient Visit Note       Active Problems      1. Rectal cancer (HCC)    2. Malignant neoplasm of colon, unspecified part of colon (HCC)        Chief Complaint   Chief Complaint   Patient presents with    Follow - Up     EP - CT and MRI 2/13.Discuss surgical options. 30mins per Dr. Galan, no symptoms.         History of Present Illness   This is a very nice 69-year-old female with a history of synchronous rectal and sigmoid colon cancer.    Patient had intermittent rectal bleeding in the summer 2024.  She was noted to have iron deficiency anemia.  She underwent a colonoscopy with Dr. Anderson on 10/29/2024.  She was found to have synchronous, short, friable, ulcerated apple core lesions in the distal sigmoid at 21 cm from the anal verge and in the proximal rectum at 7-8 cm from the anal verge.  Each lesion was biopsied.  A tattoo was placed distal to the sigmoid lesion.  Dr. Anderson comments that the rectal lesion was palpable on digital rectal exam.  Pathology from the sigmoid colon mass and rectal mass biopsies both showed infiltrating moderately differentiated adenocarcinoma, microsatellite stable.    Patient underwent a staging CT scan of the chest, abdomen and pelvis on 11/6/2024 that was negative for any obvious metastatic disease though showed bilateral micronodules in both lungs.  Patient underwent an MRI pelvis for local staging of the rectal cancer which showed a T3b N0 circumferential tumor approximately 8 cm from the anal verge.  Patient has completed 6 cycles of FOLFOX, last cycle on 1/28/2024.  Posttreatment MRI was performed on 2/13/2025 and showed a treatment related inflammation/fibrosis at the site of previously identified rectal tumor without any residual tumor signal identified.    Patient is doing very well at this time.  She is no longer having any rectal bleeding.  She is having regular bowel movements.  She denies any abdominal pain, nausea, vomiting or weight loss.  No prior  abdominal or anorectal surgery.  No family history of colon or rectal cancer.  She has had 4 vaginal deliveries and denies any fecal incontinence.    Allergies  Ilda has No Known Allergies.    Past Medical / Surgical / Social / Family History    The past medical and past surgical history have been reviewed by me today.    Past Medical History:    Cancer (HCC)    Colon cancer (HCC)    Coronary atherosclerosis    Diabetes (HCC)    High blood pressure    High cholesterol    Hyperlipidemia     Past Surgical History:   Procedure Laterality Date    Cabg      [facility] , Triple bypass    Colonoscopy N/A 10/29/2024    Dr. Anderson; polyps, sigmoid mass, rectal mass, diverticulosis    Colonoscopy N/A 10/29/2024    Procedure: COLONOSCOPY;  Surgeon: Toni Anderson MD;  Location: [facility] ENDOSCOPY    Egd N/A 10/29/2024    Dr. Anderson; hiatal hernia      82    4th child    Spine surgery procedure unlisted         The family history and social history have been reviewed by me today.    Family History   Problem Relation Age of Onset    Breast Cancer Mother 70     Social History     Socioeconomic History    Marital status:    Tobacco Use    Smoking status: Never    Smokeless tobacco: Never   Vaping Use    Vaping status: Never Used   Substance and Sexual Activity    Alcohol use: Yes     Alcohol/week: 1.0 standard drink of alcohol     Types: 1 Glasses of wine per week     Comment: OCCASIONAL    Drug use: Never   Other Topics Concern    Caffeine Concern No    Exercise No    Seat Belt No    Special Diet No    Stress Concern No    Weight Concern No        Current Outpatient Medications:     metFORMIN 500 MG Oral Tab, Take 1 tablet (500 mg total) by mouth 2 (two) times daily with meals., Disp: , Rfl:     metoprolol tartrate 50 MG Oral Tab, Take 1 tablet (50 mg total) by mouth 2x Daily(Beta Blocker)., Disp: 60 tablet, Rfl: 3    aspirin 81 MG Oral Tab EC, Take 1 tablet (81 mg total) by mouth at  bedtime., Disp: , Rfl:     rosuvastatin 10 MG Oral Tab, Take 1 tablet (10 mg total) by mouth nightly., Disp: , Rfl:     prochlorperazine (COMPAZINE) 10 mg tablet, Take 1 tablet (10 mg total) by mouth every 6 (six) hours as needed for Nausea., Disp: 30 tablet, Rfl: 3    ondansetron (ZOFRAN) 8 MG tablet, Take 1 tablet (8 mg total) by mouth every 8 (eight) hours as needed for Nausea., Disp: 30 tablet, Rfl: 3      Review of Systems  A 10 point review of systems was performed and negative unless otherwise documented per HPI.    Physical Findings   /80 (BP Location: Left arm, Patient Position: Sitting, Cuff Size: adult)   Pulse 85   Temp 97.3 °F (36.3 °C) (Temporal)   Ht 66.89\"   Wt 163 lb (73.9 kg)   SpO2 97%   BMI 25.61 kg/m²   Physical Exam  Vitals and nursing note reviewed. Exam conducted with a chaperone present.   Constitutional:       General: She is not in acute distress.  HENT:      Head: Normocephalic and atraumatic.      Mouth/Throat:      Mouth: Mucous membranes are moist.   Cardiovascular:      Rate and Rhythm: Normal rate and regular rhythm.   Pulmonary:      Effort: Pulmonary effort is normal.   Abdominal:      General: There is no distension.      Palpations: Abdomen is soft.      Tenderness: There is no abdominal tenderness.   Genitourinary:     Comments: Patient examined in the prone jackknife position with medical assistant chaperone present.  External exam of the anus is normal.  Digital rectal exam shows fair tone and good squeeze without any palpable masses, bleeding, drainage or tenderness.  Musculoskeletal:         General: No deformity.   Skin:     General: Skin is warm and dry.   Neurological:      General: No focal deficit present.      Mental Status: She is alert.   Psychiatric:         Mood and Affect: Mood normal.     I have personally reviewed the imaging of patient's recent CT scans of the abdomen and pelvis and MRI pelvis.       Assessment   1. Rectal cancer (HCC)    2.  Malignant neoplasm of colon, unspecified part of colon (HCC)        This is a very nice 69-year-old female with a history of synchronous rectal and sigmoid colon cancer.    Patient had intermittent rectal bleeding in the summer 2024.  She was noted to have iron deficiency anemia.  She underwent a colonoscopy with Dr. Anderson on 10/29/2024.  She was found to have synchronous, short, friable, ulcerated apple core lesions in the distal sigmoid at 21 cm from the anal verge and in the proximal rectum at 7-8 cm from the anal verge.  Each lesion was biopsied.  A tattoo was placed distal to the sigmoid lesion.  Dr. Anderson comments that the rectal lesion was palpable on digital rectal exam.  Pathology from the sigmoid colon mass and rectal mass biopsies both showed infiltrating moderately differentiated adenocarcinoma, microsatellite stable.    Patient underwent a staging CT scan of the chest, abdomen and pelvis on 11/6/2024 that was negative for any obvious metastatic disease though showed bilateral micronodules in both lungs.  Patient underwent an MRI pelvis for local staging of the rectal cancer which showed a T3b N0 circumferential tumor approximately 8 cm from the anal verge.  Patient has completed 6 cycles of FOLFOX, last cycle on 1/28/2024.  Posttreatment MRI was performed on 2/13/2025 and showed a treatment related inflammation/fibrosis at the site of previously identified rectal tumor without any residual tumor signal identified.    Patient is doing very well at this time.  She is no longer having any rectal bleeding.  She is having regular bowel movements.  She denies any abdominal pain, nausea, vomiting or weight loss.  No prior abdominal or anorectal surgery.  No family history of colon or rectal cancer.  She has had 4 vaginal deliveries and denies any fecal incontinence.    Patient is well-appearing on exam today.  I do not appreciate any obvious residual rectal mass on bedside digital rectal  exam.    Plan  I recommend planning for a flexible sigmoidoscopy to rule out any residual rectal mass and also to possibly tattoo the area of the rectal scar for possible surgical planning purposes.  I recommend a 2 enema prep.  The details of the procedure were discussed including the prep instructions, risks, benefits and alternatives.  Patient expressed understanding and was agreeable to schedule a flexible sigmoidoscopy procedure with me.  This has been scheduled for 2/25/2025 at [facility] under monitored anesthesia care.     No orders of the defined types were placed in this encounter.      Imaging & Referrals   None    Follow Up  No follow-ups on file.    Hector Galan MD

## 2025-02-25 NOTE — ANESTHESIA PREPROCEDURE EVALUATION
PRE-OP EVALUATION    Patient Name: Ilda Gutierrez    Admit Diagnosis: Rectal cancer (HCC) [C20]  Malignant neoplasm of colon, unspecified part of colon (HCC) [C18.9]    Pre-op Diagnosis: Rectal cancer (HCC) [C20]  Malignant neoplasm of colon, unspecified part of colon (HCC) [C18.9]    FLEXIBLE SIGMOIDOSCOPY    Anesthesia Procedure: FLEXIBLE SIGMOIDOSCOPY    Surgeons and Role:     * Hector Galan MD - Primary    Pre-op vitals reviewed.        Body mass index is 25.72 kg/m².    Current medications reviewed.  Hospital Medications:   glucose (Dex4) 15 GM/59ML oral liquid 15 g  15 g Oral Q15 Min PRN    Or    glucose (Glutose) 40% oral gel 15 g  15 g Oral Q15 Min PRN    Or    glucose-vitamin C (Dex-4) chewable tab 4 tablet  4 tablet Oral Q15 Min PRN    Or    dextrose 50% injection 50 mL  50 mL Intravenous Q15 Min PRN    Or    glucose (Dex4) 15 GM/59ML oral liquid 30 g  30 g Oral Q15 Min PRN    Or    glucose (Glutose) 40% oral gel 30 g  30 g Oral Q15 Min PRN    Or    glucose-vitamin C (Dex-4) chewable tab 8 tablet  8 tablet Oral Q15 Min PRN    lactated ringers infusion   Intravenous Continuous       Outpatient Medications:   Prescriptions Prior to Admission[1]    Allergies: Patient has no known allergies.      Anesthesia Evaluation    Patient summary reviewed.    Anesthetic Complications  (-) history of anesthetic complications         GI/Hepatic/Renal  Comment: Colon CA                               Cardiovascular    Negative cardiovascular ROS.    Exercise tolerance: good     MET: >4      (+) hypertension   (+) hyperlipidemia  (+) CAD    (+) CABG/stent                            Endo/Other    Negative endo/other ROS.  (+) diabetes                            Pulmonary    Negative pulmonary ROS.                       Neuro/Psych    Negative neuro/psych ROS.                                  Past Surgical History:   Procedure Laterality Date    Cabg  2024    [facility] , Triple bypass    Colonoscopy N/A 10/29/2024     Dr. Anderson; polyps, sigmoid mass, rectal mass, diverticulosis    Colonoscopy N/A 10/29/2024    Procedure: COLONOSCOPY;  Surgeon: Toni Anderson MD;  Location: [facility] ENDOSCOPY    Egd N/A 10/29/2024    Dr. Anderson; hiatal hernia      82    4th child    Spine surgery procedure unlisted       Social History     Socioeconomic History    Marital status:    Tobacco Use    Smoking status: Never    Smokeless tobacco: Never   Vaping Use    Vaping status: Never Used   Substance and Sexual Activity    Alcohol use: Yes     Alcohol/week: 1.0 standard drink of alcohol     Types: 1 Glasses of wine per week     Comment: OCCASIONAL    Drug use: Never   Other Topics Concern    Caffeine Concern No    Exercise No    Seat Belt No    Special Diet No    Stress Concern No    Weight Concern No     History   Drug Use Unknown     Available pre-op labs reviewed.  Lab Results   Component Value Date    WBC 11.6 (H) 2025    RBC 4.15 2025    HGB 11.9 (L) 2025    HCT 36.5 2025    MCV 88.0 2025    MCH 28.7 2025    MCHC 32.6 2025    RDW 20.8 2025    .0 (L) 2025     Lab Results   Component Value Date     2025    K 4.4 2025     2025    CO2 24.0 2025    BUN 14 2025    CREATSERUM 0.90 2025     (H) 2025    CA 9.6 2025            Airway      Mallampati: II  Mouth opening: 3 FB  TM distance: 4 - 6 cm  Neck ROM: full Cardiovascular    Cardiovascular exam normal.  Rhythm: regular  Rate: normal  (-) murmur   Dental             Pulmonary    Pulmonary exam normal.  Breath sounds clear to auscultation bilaterally.               Other findings              ASA: 3   Plan: MAC  NPO status verified and patient meets guidelines.    Post-procedure pain management plan discussed with surgeon and patient.    Comment: Discussed MAC anesthesia with patient.  Discussed risks including but not limited to perioperative  awareness, conversion to general anesthesia and risks associated with GA.  PT understands and agrees to proceed.    Plan/risks discussed with: patient                Present on Admission:  **None**             [1]   Medications Prior to Admission   Medication Sig Dispense Refill Last Dose/Taking    metFORMIN 500 MG Oral Tab Take 1 tablet (500 mg total) by mouth 2 (two) times daily with meals.   Taking    metoprolol tartrate 50 MG Oral Tab Take 1 tablet (50 mg total) by mouth 2x Daily(Beta Blocker). 60 tablet 3 Taking    aspirin 81 MG Oral Tab EC Take 1 tablet (81 mg total) by mouth at bedtime.   Taking    rosuvastatin 10 MG Oral Tab Take 1 tablet (10 mg total) by mouth nightly.   Taking    prochlorperazine (COMPAZINE) 10 mg tablet Take 1 tablet (10 mg total) by mouth every 6 (six) hours as needed for Nausea. 30 tablet 3     ondansetron (ZOFRAN) 8 MG tablet Take 1 tablet (8 mg total) by mouth every 8 (eight) hours as needed for Nausea. 30 tablet 3

## 2025-03-03 ENCOUNTER — TELEPHONE (OUTPATIENT)
Facility: LOCATION | Age: 70
End: 2025-03-03

## 2025-03-03 NOTE — TELEPHONE ENCOUNTER
I called patient to discuss low anterior resection as a next step in treatment for her sigmoid colon cancer and rectal cancer.  I confirmed the patient's full name and date of birth.    I recommend planning for robotic low anterior resection, possible open, possible ostomy. The details of this procedure were discussed with patient including the expected recovery time, risks, benefits and alternatives. Specific risks of surgery that were discussed include but not limited to pain, bleeding, infection, bowel or ureteral injury, bowel dysfunction, urinary dysfunction, sexual dysfunction and anastomotic complications such as leak, bleeding or stricture. There is also a risk of possible ostomy creation at the time of surgery.     I recommend completion of a preoperative bowel prep and oral antibiotics per ERAS protocol. Patients are generally hospitalized for 2-5 days after this type of surgery.  I advise no lifting more than 10-15 pounds for total of 6 weeks after surgery.  Patient would remain on a soft/low fiber diet for 4 to 6 weeks after surgery.     I counseled patient that we will not know the pathology staging of the colon and rectal cancer until review of the pathology report after surgery. Patient may need further treatment with chemotherapy or radiation depending on the surgical pathology findings.     Patient expressed understanding and was agreeable to schedule surgery with me.  Surgery has been scheduled for 3/26/2025.  I will ask my nurses to review the ERAS education information with the patient.  Patient is welcome to contact my office in the meantime with any questions or concerns.

## 2025-03-05 ENCOUNTER — TELEPHONE (OUTPATIENT)
Facility: LOCATION | Age: 70
End: 2025-03-05

## 2025-03-05 DIAGNOSIS — C18.7 CANCER OF SIGMOID COLON (HCC): Primary | ICD-10-CM

## 2025-03-05 DIAGNOSIS — C20 RECTAL CANCER (HCC): ICD-10-CM

## 2025-03-14 ENCOUNTER — TELEPHONE (OUTPATIENT)
Facility: LOCATION | Age: 70
End: 2025-03-14

## 2025-03-14 NOTE — TELEPHONE ENCOUNTER
KAYY COLEMAN Patient  Member ID  [id number]    Date of Birth  1955-04-12    Gender  NA    Transaction Type  Inpatient Authorization    [facility]    Payer  [employer]    [employer] logo     Certificate Information  Certification Number  [id number]    Status  CERTIFIED IN TOTAL    Effective Date  2025-03-26    Expiration Date  2025-03-27    Service Information  Service Type  1 - Medical Care    Admission - Discharge Date  2025-03-26    Diagnosis Code 1  C20 - Malignant neoplasm of rectum    Diagnosis Code 2  C187 - Malignant neoplasm of sigmoid colon    Procedure Code 1 (CPT/HCPCS)  74937 - L COLECTOMY/COLOPROCTOSTOMY    Procedure From - To Date  2025-03-26 - 2025-03-27    Description  Laparoscopy, surgical; colectomy, partial, with anastomosis, with coloproctostomy (low pelvic anastomosis)

## 2025-03-17 PROBLEM — R91.8 PULMONARY NODULES: Status: ACTIVE | Noted: 2024-11-11

## 2025-03-17 PROBLEM — I48.0 PAF (PAROXYSMAL ATRIAL FIBRILLATION) (HCC): Status: ACTIVE | Noted: 2024-06-24

## 2025-03-17 PROBLEM — I10 HYPERTENSION: Status: ACTIVE | Noted: 2024-05-29

## 2025-03-17 PROBLEM — D12.6 TUBULAR ADENOMA OF COLON: Status: ACTIVE | Noted: 2024-11-12

## 2025-03-17 PROBLEM — R93.1 ABNORMAL CT SCAN OF HEART: Status: ACTIVE | Noted: 2024-05-28

## 2025-03-17 RX ORDER — SODIUM CHLORIDE, SODIUM LACTATE, POTASSIUM CHLORIDE, CALCIUM CHLORIDE 600; 310; 30; 20 MG/100ML; MG/100ML; MG/100ML; MG/100ML
INJECTION, SOLUTION INTRAVENOUS CONTINUOUS
Status: CANCELLED | OUTPATIENT
Start: 2025-03-17

## 2025-03-18 ENCOUNTER — TELEPHONE (OUTPATIENT)
Facility: LOCATION | Age: 70
End: 2025-03-18

## 2025-03-18 NOTE — TELEPHONE ENCOUNTER
I called the patient to discuss the recommendations from multidisciplinary rectal cancer tumor board from 3/10/2025.  During review of the patient's MRI pelvis from 2/13/2025, Dr. Estrada (radiology) noted \"the anterior margin of the high rectum near level of treated known rectal cancer abuts the posterior wall of the lower uterine segment.  This finding is best appreciated on sagittal images   series 6, image 18 for example.  There is no specific evidence of invasion of uterus.\"    Given this finding, I recommend Dr. Jin from surgical oncology is on standby for possible hysterectomy if there is intraoperative findings suggestive of invasion of the patient's rectal cancer into the posterior uterus.  I called the patient to inform her of this recommendation.  I confirmed her full name and date of birth.    Patient was agreeable to have Dr. Jin on standby for possible hysterectomy.  Patient wished to meet Dr. Jin the day of her scheduled surgery and did not feel was necessary to have an in-person or telephone consultation with him prior to surgery.

## 2025-03-19 ENCOUNTER — TELEPHONE (OUTPATIENT)
Facility: LOCATION | Age: 70
End: 2025-03-19

## 2025-03-19 NOTE — TELEPHONE ENCOUNTER
Called [facility] [phone] to confirm if they received fax sent to them requesting cardiac clearance. I was told it takes 24 hours for them to see it in their system and that I would need to call tomorrow.

## 2025-03-21 ENCOUNTER — TELEPHONE (OUTPATIENT)
Facility: LOCATION | Age: 70
End: 2025-03-21

## 2025-03-21 ENCOUNTER — LABORATORY ENCOUNTER (OUTPATIENT)
Dept: LAB | Age: 70
End: 2025-03-21
Attending: STUDENT IN AN ORGANIZED HEALTH CARE EDUCATION/TRAINING PROGRAM
Payer: MEDICARE

## 2025-03-21 DIAGNOSIS — Z01.818 PRE-OP TESTING: ICD-10-CM

## 2025-03-21 DIAGNOSIS — C18.7 CANCER OF SIGMOID COLON (HCC): ICD-10-CM

## 2025-03-21 DIAGNOSIS — C20 RECTAL ADENOCARCINOMA (HCC): ICD-10-CM

## 2025-03-21 LAB
ERYTHROCYTE [DISTWIDTH] IN BLOOD BY AUTOMATED COUNT: 14.5 %
HCT VFR BLD AUTO: 42.1 %
HGB BLD-MCNC: 13.3 G/DL
MCH RBC QN AUTO: 30.3 PG (ref 26–34)
MCHC RBC AUTO-ENTMCNC: 31.6 G/DL (ref 31–37)
MCV RBC AUTO: 95.9 FL
PLATELET # BLD AUTO: 204 10(3)UL (ref 150–450)
RBC # BLD AUTO: 4.39 X10(6)UL
WBC # BLD AUTO: 5.3 X10(3) UL (ref 4–11)

## 2025-03-21 PROCEDURE — 85027 COMPLETE CBC AUTOMATED: CPT

## 2025-03-21 PROCEDURE — 36415 COLL VENOUS BLD VENIPUNCTURE: CPT

## 2025-03-21 NOTE — TELEPHONE ENCOUNTER
Fax received from [facility]:  \"She is ACC/AHA acceptable cardiac risk for the planned noncardiac procedure per Dr. Hall, may proceed.  May hold aspirin 5-7 days.\"    Faxed to [facility], and copy placed in Clearance binder.

## 2025-03-24 ENCOUNTER — TELEPHONE (OUTPATIENT)
Facility: LOCATION | Age: 70
End: 2025-03-24

## 2025-03-24 DIAGNOSIS — C20 RECTAL CANCER (HCC): Primary | ICD-10-CM

## 2025-03-24 RX ORDER — NEOMYCIN SULFATE 500 MG/1
TABLET ORAL
Qty: 6 TABLET | Refills: 0 | Status: SHIPPED | OUTPATIENT
Start: 2025-03-24

## 2025-03-24 RX ORDER — POLYETHYLENE GLYCOL 3350, SODIUM CHLORIDE, SODIUM BICARBONATE, POTASSIUM CHLORIDE 420; 11.2; 5.72; 1.48 G/4L; G/4L; G/4L; G/4L
POWDER, FOR SOLUTION ORAL
Qty: 1 EACH | Refills: 0 | Status: SHIPPED | OUTPATIENT
Start: 2025-03-24

## 2025-03-24 RX ORDER — METRONIDAZOLE 500 MG/1
TABLET ORAL
Qty: 3 TABLET | Refills: 0 | Status: SHIPPED | OUTPATIENT
Start: 2025-03-24

## 2025-03-24 NOTE — TELEPHONE ENCOUNTER
The patient recently called stating that she was told by  that she is suppose to be prescribed two medications to take during her prep for her upcoming surgery.    Call back # [phone]

## 2025-03-24 NOTE — TELEPHONE ENCOUNTER
Spoke with patient ESTELLA explained to patient.  Patient advised antibiotics are to be taken 3 pM, 4pm and 10 PM.  Patient advised to drink first gatorade at 10 PM.  Patient advised to Drink only clear liquids the day before her procedure and start Trilyte at 4 PM.  Patient advised she should be finished with Trilyte around 9 PM.  Patient advised to Drink last gatorade 2 hours before she arrives to hospital along with  two(2)  500 mg Tabs Tylenol. Patient advised to shower with Hibilclens.  Patient given opportunity to ask questions.  Patient verbalized surgery prep instructions provided.

## 2025-03-24 NOTE — PATIENT INSTRUCTIONS
During this visit, the Enhanced Recovery after Intestinal Surgery (ERAS) Patient Guide was discussed with Ilda. She was provided a copy of the guide to review at home, which includes education on the strategy, pre-op, intra-op and what to expect during the hospital stay for elective colorectal cases.

## 2025-03-25 ENCOUNTER — ANESTHESIA EVENT (OUTPATIENT)
Dept: SURGERY | Facility: HOSPITAL | Age: 70
End: 2025-03-25
Payer: MEDICARE

## 2025-03-25 NOTE — ANESTHESIA PREPROCEDURE EVALUATION
PRE-OP EVALUATION    Patient Name: Ilda Gutierrez    Admit Diagnosis: Cancer of sigmoid colon (HCC) [C18.7]  Rectal cancer (HCC) [C20]    Pre-op Diagnosis: Cancer of sigmoid colon (HCC) [C18.7]  Rectal cancer (HCC) [C20]    ROBOTIC LAPAROSCOPIC LOW ANTERIOR COLON RESECTION, POSSIBLE OSTOMY, POSSIBLE OPEN    Anesthesia Procedure: ROBOTIC LAPAROSCOPIC LOW ANTERIOR COLON RESECTION, POSSIBLE OSTOMY, POSSIBLE OPEN    Surgeons and Role:     * Hector Galan MD - Primary    Pre-op vitals reviewed.        Body mass index is 25.06 kg/m².    Current medications reviewed.  Hospital Medications:  No current facility-administered medications on file as of .       Outpatient Medications:   Prescriptions Prior to Admission[1]    Allergies: Patient has no known allergies.      Anesthesia Evaluation    Patient summary reviewed.    Anesthetic Complications           GI/Hepatic/Renal                                 Cardiovascular      ECG reviewed.            (+) hypertension     (+) CAD    (+) CABG/stent         (+) dysrhythmias and atrial fibrillation                  Endo/Other      (+) diabetes  type 2, not using insulin                         Pulmonary                           Neuro/Psych                              Abnormal CT scan of heart Abnormal stress test  Adenocarcinoma of sigmoid colon (HCC) Anemia  Cancer of sigmoid colon (HCC) Chest pain, precordial  Coronary artery disease involving native coronary artery of native heart with unstable angina pectoris (HCC) Hypercholesterolemia  Hypertension Iron deficiency anemia  PAF (paroxysmal atrial fibrillation) (HCC) Pulmonary nodules  Rectal adenocarcinoma (HCC) Rectal cancer (HCC)  S/P CABG x 3 Tubular adenoma of colon  Type 2 diabetes mellitus without complication, without long-term current use of insulin (HCC)             Past Surgical History:   Procedure Laterality Date    Cabg  2024    [facility] , Triple bypass    Colonoscopy N/A 10/29/2024    Dr. Anderson;  polyps, sigmoid mass, rectal mass, diverticulosis    Colonoscopy N/A 10/29/2024    Procedure: COLONOSCOPY;  Surgeon: Toni Anderson MD;  Location: [facility] ENDOSCOPY    Egd N/A 10/29/2024    Dr. Anderson; hiatal hernia      82    4th child    Spine surgery procedure unlisted       Social History     Socioeconomic History    Marital status:    Tobacco Use    Smoking status: Never    Smokeless tobacco: Never   Vaping Use    Vaping status: Never Used   Substance and Sexual Activity    Alcohol use: Yes     Alcohol/week: 1.0 standard drink of alcohol     Types: 1 Glasses of wine per week     Comment: OCCASIONAL    Drug use: Never   Other Topics Concern    Caffeine Concern No    Exercise No    Seat Belt No    Special Diet No    Stress Concern No    Weight Concern No     History   Drug Use Unknown     Available pre-op labs reviewed.  Lab Results   Component Value Date    WBC 5.3 2025    RBC 4.39 2025    HGB 13.3 2025    HCT 42.1 2025    MCV 95.9 2025    MCH 30.3 2025    MCHC 31.6 2025    RDW 14.5 2025    .0 2025     Lab Results   Component Value Date     2025    K 4.4 2025     2025    CO2 24.0 2025    BUN 14 2025    CREATSERUM 0.90 2025     (H) 2025    CA 9.6 2025            Airway      Mallampati: I  Mouth opening: >3 FB  TM distance: > 6 cm  Neck ROM: full Cardiovascular    Cardiovascular exam normal.         Dental    Dentition appears grossly intact         Pulmonary    Pulmonary exam normal.                 Other findings              ASA: 3   Plan: general  NPO status verified and           Plan/risks discussed with: patient                Present on Admission:  **None**             [1]   No medications prior to admission.

## 2025-03-26 ENCOUNTER — HOSPITAL ENCOUNTER (INPATIENT)
Facility: HOSPITAL | Age: 70
LOS: 2 days | Discharge: HOME | End: 2025-03-28
Attending: STUDENT IN AN ORGANIZED HEALTH CARE EDUCATION/TRAINING PROGRAM | Admitting: STUDENT IN AN ORGANIZED HEALTH CARE EDUCATION/TRAINING PROGRAM
Payer: MEDICARE

## 2025-03-26 ENCOUNTER — ANESTHESIA (OUTPATIENT)
Dept: SURGERY | Facility: HOSPITAL | Age: 70
End: 2025-03-26
Payer: MEDICARE

## 2025-03-26 ENCOUNTER — HOSPITAL ENCOUNTER (INPATIENT)
Facility: HOSPITAL | Age: 70
LOS: 2 days | Discharge: HOME | DRG: 330 | End: 2025-03-28
Attending: STUDENT IN AN ORGANIZED HEALTH CARE EDUCATION/TRAINING PROGRAM | Admitting: STUDENT IN AN ORGANIZED HEALTH CARE EDUCATION/TRAINING PROGRAM
Payer: MEDICARE

## 2025-03-26 DIAGNOSIS — C18.7 CANCER OF SIGMOID COLON (HCC): ICD-10-CM

## 2025-03-26 DIAGNOSIS — C20 RECTAL CANCER (HCC): ICD-10-CM

## 2025-03-26 DIAGNOSIS — G89.18 POST-OP PAIN: ICD-10-CM

## 2025-03-26 DIAGNOSIS — C20 RECTAL ADENOCARCINOMA (HCC): ICD-10-CM

## 2025-03-26 DIAGNOSIS — Z01.818 PRE-OP TESTING: Primary | ICD-10-CM

## 2025-03-26 LAB
GLUCOSE BLD-MCNC: 131 MG/DL (ref 70–99)
GLUCOSE BLD-MCNC: 141 MG/DL (ref 70–99)
GLUCOSE BLD-MCNC: 158 MG/DL (ref 70–99)
GLUCOSE BLD-MCNC: 167 MG/DL (ref 70–99)

## 2025-03-26 PROCEDURE — 44207 L COLECTOMY/COLOPROCTOSTOMY: CPT

## 2025-03-26 PROCEDURE — 76942 ECHO GUIDE FOR BIOPSY: CPT | Performed by: ANESTHESIOLOGY

## 2025-03-26 PROCEDURE — 99223 1ST HOSP IP/OBS HIGH 75: CPT | Performed by: INTERNAL MEDICINE

## 2025-03-26 PROCEDURE — 0DBP4ZZ EXCISION OF RECTUM, PERCUTANEOUS ENDOSCOPIC APPROACH: ICD-10-PCS | Performed by: STUDENT IN AN ORGANIZED HEALTH CARE EDUCATION/TRAINING PROGRAM

## 2025-03-26 PROCEDURE — 8E0W4CZ ROBOTIC ASSISTED PROCEDURE OF TRUNK REGION, PERCUTANEOUS ENDOSCOPIC APPROACH: ICD-10-PCS | Performed by: STUDENT IN AN ORGANIZED HEALTH CARE EDUCATION/TRAINING PROGRAM

## 2025-03-26 PROCEDURE — 0DJD8ZZ INSPECTION OF LOWER INTESTINAL TRACT, VIA NATURAL OR ARTIFICIAL OPENING ENDOSCOPIC: ICD-10-PCS | Performed by: STUDENT IN AN ORGANIZED HEALTH CARE EDUCATION/TRAINING PROGRAM

## 2025-03-26 PROCEDURE — 44207 L COLECTOMY/COLOPROCTOSTOMY: CPT | Performed by: STUDENT IN AN ORGANIZED HEALTH CARE EDUCATION/TRAINING PROGRAM

## 2025-03-26 PROCEDURE — 3E0T3BZ INTRODUCTION OF ANESTHETIC AGENT INTO PERIPHERAL NERVES AND PLEXI, PERCUTANEOUS APPROACH: ICD-10-PCS | Performed by: ANESTHESIOLOGY

## 2025-03-26 PROCEDURE — 0DTN4ZZ RESECTION OF SIGMOID COLON, PERCUTANEOUS ENDOSCOPIC APPROACH: ICD-10-PCS | Performed by: STUDENT IN AN ORGANIZED HEALTH CARE EDUCATION/TRAINING PROGRAM

## 2025-03-26 RX ORDER — OXYCODONE HYDROCHLORIDE 5 MG/1
5 TABLET ORAL EVERY 4 HOURS PRN
Status: DISCONTINUED | OUTPATIENT
Start: 2025-03-26 | End: 2025-03-28

## 2025-03-26 RX ORDER — NICOTINE POLACRILEX 4 MG
15 LOZENGE BUCCAL
Status: DISCONTINUED | OUTPATIENT
Start: 2025-03-26 | End: 2025-03-28

## 2025-03-26 RX ORDER — ONDANSETRON 2 MG/ML
INJECTION INTRAMUSCULAR; INTRAVENOUS AS NEEDED
Status: DISCONTINUED | OUTPATIENT
Start: 2025-03-26 | End: 2025-03-26 | Stop reason: SURG

## 2025-03-26 RX ORDER — NICOTINE POLACRILEX 4 MG
30 LOZENGE BUCCAL
Status: DISCONTINUED | OUTPATIENT
Start: 2025-03-26 | End: 2025-03-26 | Stop reason: HOSPADM

## 2025-03-26 RX ORDER — METRONIDAZOLE 500 MG/100ML
500 INJECTION, SOLUTION INTRAVENOUS ONCE
Status: COMPLETED | OUTPATIENT
Start: 2025-03-26 | End: 2025-03-26

## 2025-03-26 RX ORDER — ACETAMINOPHEN 500 MG
1000 TABLET ORAL ONCE
Status: DISCONTINUED | OUTPATIENT
Start: 2025-03-26 | End: 2025-03-26 | Stop reason: HOSPADM

## 2025-03-26 RX ORDER — ACETAMINOPHEN 500 MG
1000 TABLET ORAL EVERY 8 HOURS SCHEDULED
Status: DISCONTINUED | OUTPATIENT
Start: 2025-03-26 | End: 2025-03-28

## 2025-03-26 RX ORDER — SODIUM CHLORIDE 9 MG/ML
INJECTION, SOLUTION INTRAVENOUS CONTINUOUS
Status: DISCONTINUED | OUTPATIENT
Start: 2025-03-26 | End: 2025-03-28

## 2025-03-26 RX ORDER — POLYETHYLENE GLYCOL 3350 17 G/17G
17 POWDER, FOR SOLUTION ORAL DAILY PRN
Status: DISCONTINUED | OUTPATIENT
Start: 2025-03-26 | End: 2025-03-28

## 2025-03-26 RX ORDER — KETAMINE HYDROCHLORIDE 50 MG/ML
INJECTION, SOLUTION INTRAMUSCULAR; INTRAVENOUS AS NEEDED
Status: DISCONTINUED | OUTPATIENT
Start: 2025-03-26 | End: 2025-03-26 | Stop reason: SURG

## 2025-03-26 RX ORDER — METOPROLOL TARTRATE 50 MG
50 TABLET ORAL
Status: DISCONTINUED | OUTPATIENT
Start: 2025-03-26 | End: 2025-03-28

## 2025-03-26 RX ORDER — DEXAMETHASONE SODIUM PHOSPHATE 10 MG/ML
INJECTION, SOLUTION INTRAMUSCULAR; INTRAVENOUS AS NEEDED
Status: DISCONTINUED | OUTPATIENT
Start: 2025-03-26 | End: 2025-03-26 | Stop reason: SURG

## 2025-03-26 RX ORDER — HYDROMORPHONE HYDROCHLORIDE 1 MG/ML
0.4 INJECTION, SOLUTION INTRAMUSCULAR; INTRAVENOUS; SUBCUTANEOUS EVERY 2 HOUR PRN
Status: DISCONTINUED | OUTPATIENT
Start: 2025-03-26 | End: 2025-03-28

## 2025-03-26 RX ORDER — DEXAMETHASONE SODIUM PHOSPHATE 4 MG/ML
VIAL (ML) INJECTION AS NEEDED
Status: DISCONTINUED | OUTPATIENT
Start: 2025-03-26 | End: 2025-03-26 | Stop reason: SURG

## 2025-03-26 RX ORDER — ONDANSETRON 2 MG/ML
4 INJECTION INTRAMUSCULAR; INTRAVENOUS EVERY 6 HOURS PRN
Status: DISCONTINUED | OUTPATIENT
Start: 2025-03-26 | End: 2025-03-26 | Stop reason: HOSPADM

## 2025-03-26 RX ORDER — ONDANSETRON 2 MG/ML
4 INJECTION INTRAMUSCULAR; INTRAVENOUS EVERY 6 HOURS PRN
Status: DISCONTINUED | OUTPATIENT
Start: 2025-03-26 | End: 2025-03-28

## 2025-03-26 RX ORDER — DIPHENHYDRAMINE HYDROCHLORIDE 50 MG/ML
12.5 INJECTION, SOLUTION INTRAMUSCULAR; INTRAVENOUS AS NEEDED
Status: DISCONTINUED | OUTPATIENT
Start: 2025-03-26 | End: 2025-03-26 | Stop reason: HOSPADM

## 2025-03-26 RX ORDER — HYDROMORPHONE HYDROCHLORIDE 1 MG/ML
INJECTION, SOLUTION INTRAMUSCULAR; INTRAVENOUS; SUBCUTANEOUS
Status: COMPLETED
Start: 2025-03-26 | End: 2025-03-26

## 2025-03-26 RX ORDER — SODIUM CHLORIDE, SODIUM LACTATE, POTASSIUM CHLORIDE, CALCIUM CHLORIDE 600; 310; 30; 20 MG/100ML; MG/100ML; MG/100ML; MG/100ML
INJECTION, SOLUTION INTRAVENOUS CONTINUOUS
Status: DISCONTINUED | OUTPATIENT
Start: 2025-03-26 | End: 2025-03-26 | Stop reason: HOSPADM

## 2025-03-26 RX ORDER — METOCLOPRAMIDE HYDROCHLORIDE 5 MG/ML
10 INJECTION INTRAMUSCULAR; INTRAVENOUS EVERY 8 HOURS PRN
Status: DISCONTINUED | OUTPATIENT
Start: 2025-03-26 | End: 2025-03-28

## 2025-03-26 RX ORDER — METHOCARBAMOL 100 MG/ML
INJECTION, SOLUTION INTRAMUSCULAR; INTRAVENOUS
Status: COMPLETED
Start: 2025-03-26 | End: 2025-03-26

## 2025-03-26 RX ORDER — FAMOTIDINE 10 MG/ML
20 INJECTION, SOLUTION INTRAVENOUS 2 TIMES DAILY
Status: DISCONTINUED | OUTPATIENT
Start: 2025-03-26 | End: 2025-03-28

## 2025-03-26 RX ORDER — OXYCODONE HYDROCHLORIDE 5 MG/1
10 TABLET ORAL ONCE AS NEEDED
Status: DISCONTINUED | OUTPATIENT
Start: 2025-03-26 | End: 2025-03-26 | Stop reason: HOSPADM

## 2025-03-26 RX ORDER — KETOROLAC TROMETHAMINE 30 MG/ML
INJECTION, SOLUTION INTRAMUSCULAR; INTRAVENOUS AS NEEDED
Status: DISCONTINUED | OUTPATIENT
Start: 2025-03-26 | End: 2025-03-26 | Stop reason: SURG

## 2025-03-26 RX ORDER — MIDAZOLAM HYDROCHLORIDE 1 MG/ML
INJECTION INTRAMUSCULAR; INTRAVENOUS
Status: COMPLETED
Start: 2025-03-26 | End: 2025-03-26

## 2025-03-26 RX ORDER — LIDOCAINE HYDROCHLORIDE ANHYDROUS AND DEXTROSE MONOHYDRATE .8; 5 G/100ML; G/100ML
INJECTION, SOLUTION INTRAVENOUS CONTINUOUS PRN
Status: DISCONTINUED | OUTPATIENT
Start: 2025-03-26 | End: 2025-03-26 | Stop reason: SURG

## 2025-03-26 RX ORDER — NALOXONE HYDROCHLORIDE 0.4 MG/ML
0.08 INJECTION, SOLUTION INTRAMUSCULAR; INTRAVENOUS; SUBCUTANEOUS AS NEEDED
Status: DISCONTINUED | OUTPATIENT
Start: 2025-03-26 | End: 2025-03-26 | Stop reason: HOSPADM

## 2025-03-26 RX ORDER — DEXTROSE MONOHYDRATE 25 G/50ML
50 INJECTION, SOLUTION INTRAVENOUS
Status: DISCONTINUED | OUTPATIENT
Start: 2025-03-26 | End: 2025-03-28

## 2025-03-26 RX ORDER — METHOCARBAMOL 100 MG/ML
1000 INJECTION, SOLUTION INTRAMUSCULAR; INTRAVENOUS ONCE
Status: COMPLETED | OUTPATIENT
Start: 2025-03-26 | End: 2025-03-26

## 2025-03-26 RX ORDER — OXYCODONE HYDROCHLORIDE 5 MG/1
5 TABLET ORAL ONCE AS NEEDED
Status: DISCONTINUED | OUTPATIENT
Start: 2025-03-26 | End: 2025-03-26 | Stop reason: HOSPADM

## 2025-03-26 RX ORDER — ACETAMINOPHEN 10 MG/ML
INJECTION, SOLUTION INTRAVENOUS AS NEEDED
Status: DISCONTINUED | OUTPATIENT
Start: 2025-03-26 | End: 2025-03-26 | Stop reason: SURG

## 2025-03-26 RX ORDER — BUPIVACAINE HYDROCHLORIDE 2.5 MG/ML
INJECTION, SOLUTION EPIDURAL; INFILTRATION; INTRACAUDAL; PERINEURAL AS NEEDED
Status: DISCONTINUED | OUTPATIENT
Start: 2025-03-26 | End: 2025-03-26 | Stop reason: HOSPADM

## 2025-03-26 RX ORDER — DEXTROSE MONOHYDRATE 25 G/50ML
50 INJECTION, SOLUTION INTRAVENOUS
Status: DISCONTINUED | OUTPATIENT
Start: 2025-03-26 | End: 2025-03-26 | Stop reason: HOSPADM

## 2025-03-26 RX ORDER — FAMOTIDINE 20 MG/1
20 TABLET, FILM COATED ORAL 2 TIMES DAILY
Status: DISCONTINUED | OUTPATIENT
Start: 2025-03-26 | End: 2025-03-28

## 2025-03-26 RX ORDER — INDOCYANINE GREEN AND WATER 25 MG
KIT INJECTION AS NEEDED
Status: DISCONTINUED | OUTPATIENT
Start: 2025-03-26 | End: 2025-03-26 | Stop reason: SURG

## 2025-03-26 RX ORDER — HEPARIN SODIUM 5000 [USP'U]/ML
5000 INJECTION, SOLUTION INTRAVENOUS; SUBCUTANEOUS EVERY 8 HOURS SCHEDULED
Status: DISCONTINUED | OUTPATIENT
Start: 2025-03-27 | End: 2025-03-28

## 2025-03-26 RX ORDER — MEPERIDINE HYDROCHLORIDE 25 MG/ML
12.5 INJECTION INTRAMUSCULAR; INTRAVENOUS; SUBCUTANEOUS AS NEEDED
Status: DISCONTINUED | OUTPATIENT
Start: 2025-03-26 | End: 2025-03-26 | Stop reason: HOSPADM

## 2025-03-26 RX ORDER — INSULIN ASPART 100 [IU]/ML
INJECTION, SOLUTION INTRAVENOUS; SUBCUTANEOUS ONCE
Status: COMPLETED | OUTPATIENT
Start: 2025-03-26 | End: 2025-03-26

## 2025-03-26 RX ORDER — HYDROMORPHONE HYDROCHLORIDE 1 MG/ML
0.4 INJECTION, SOLUTION INTRAMUSCULAR; INTRAVENOUS; SUBCUTANEOUS EVERY 5 MIN PRN
Status: DISCONTINUED | OUTPATIENT
Start: 2025-03-26 | End: 2025-03-26 | Stop reason: HOSPADM

## 2025-03-26 RX ORDER — NICOTINE POLACRILEX 4 MG
15 LOZENGE BUCCAL
Status: DISCONTINUED | OUTPATIENT
Start: 2025-03-26 | End: 2025-03-26 | Stop reason: HOSPADM

## 2025-03-26 RX ORDER — HEPARIN SODIUM 5000 [USP'U]/ML
5000 INJECTION, SOLUTION INTRAVENOUS; SUBCUTANEOUS ONCE
Status: COMPLETED | OUTPATIENT
Start: 2025-03-26 | End: 2025-03-26

## 2025-03-26 RX ORDER — ROSUVASTATIN CALCIUM 10 MG/1
10 TABLET, COATED ORAL NIGHTLY
Status: DISCONTINUED | OUTPATIENT
Start: 2025-03-26 | End: 2025-03-28

## 2025-03-26 RX ORDER — INSULIN ASPART 100 [IU]/ML
INJECTION, SOLUTION INTRAVENOUS; SUBCUTANEOUS
Status: COMPLETED
Start: 2025-03-26 | End: 2025-03-26

## 2025-03-26 RX ORDER — ROCURONIUM BROMIDE 10 MG/ML
INJECTION, SOLUTION INTRAVENOUS AS NEEDED
Status: DISCONTINUED | OUTPATIENT
Start: 2025-03-26 | End: 2025-03-26 | Stop reason: SURG

## 2025-03-26 RX ORDER — NICOTINE POLACRILEX 4 MG
30 LOZENGE BUCCAL
Status: DISCONTINUED | OUTPATIENT
Start: 2025-03-26 | End: 2025-03-28

## 2025-03-26 RX ORDER — SODIUM CHLORIDE 9 MG/ML
100 INJECTION, SOLUTION INTRAVENOUS CONTINUOUS
Status: DISCONTINUED | OUTPATIENT
Start: 2025-03-26 | End: 2025-03-28

## 2025-03-26 RX ORDER — LIDOCAINE HYDROCHLORIDE 10 MG/ML
INJECTION, SOLUTION EPIDURAL; INFILTRATION; INTRACAUDAL; PERINEURAL AS NEEDED
Status: DISCONTINUED | OUTPATIENT
Start: 2025-03-26 | End: 2025-03-26 | Stop reason: SURG

## 2025-03-26 RX ORDER — HYDROMORPHONE HYDROCHLORIDE 1 MG/ML
0.2 INJECTION, SOLUTION INTRAMUSCULAR; INTRAVENOUS; SUBCUTANEOUS EVERY 5 MIN PRN
Status: DISCONTINUED | OUTPATIENT
Start: 2025-03-26 | End: 2025-03-26 | Stop reason: HOSPADM

## 2025-03-26 RX ORDER — KETOROLAC TROMETHAMINE 15 MG/ML
15 INJECTION, SOLUTION INTRAMUSCULAR; INTRAVENOUS EVERY 6 HOURS PRN
Status: DISCONTINUED | OUTPATIENT
Start: 2025-03-26 | End: 2025-03-28

## 2025-03-26 RX ORDER — HYDROMORPHONE HYDROCHLORIDE 1 MG/ML
0.2 INJECTION, SOLUTION INTRAMUSCULAR; INTRAVENOUS; SUBCUTANEOUS EVERY 2 HOUR PRN
Status: DISCONTINUED | OUTPATIENT
Start: 2025-03-26 | End: 2025-03-28

## 2025-03-26 RX ORDER — MIDAZOLAM HYDROCHLORIDE 1 MG/ML
1 INJECTION INTRAMUSCULAR; INTRAVENOUS EVERY 5 MIN PRN
Status: DISCONTINUED | OUTPATIENT
Start: 2025-03-26 | End: 2025-03-26 | Stop reason: HOSPADM

## 2025-03-26 RX ORDER — SENNOSIDES 8.6 MG
17.2 TABLET ORAL NIGHTLY PRN
Status: DISCONTINUED | OUTPATIENT
Start: 2025-03-26 | End: 2025-03-28

## 2025-03-26 RX ADMIN — SODIUM CHLORIDE: 9 INJECTION, SOLUTION INTRAVENOUS at 13:36:00

## 2025-03-26 RX ADMIN — ROCURONIUM BROMIDE 5 MG: 10 INJECTION, SOLUTION INTRAVENOUS at 12:41:00

## 2025-03-26 RX ADMIN — METRONIDAZOLE 500 MG: 500 INJECTION, SOLUTION INTRAVENOUS at 08:33:00

## 2025-03-26 RX ADMIN — ONDANSETRON 4 MG: 2 INJECTION INTRAMUSCULAR; INTRAVENOUS at 13:20:00

## 2025-03-26 RX ADMIN — ROCURONIUM BROMIDE 10 MG: 10 INJECTION, SOLUTION INTRAVENOUS at 11:15:00

## 2025-03-26 RX ADMIN — DEXAMETHASONE SODIUM PHOSPHATE 8 MG: 4 MG/ML VIAL (ML) INJECTION at 08:49:00

## 2025-03-26 RX ADMIN — ACETAMINOPHEN 1000 MG: 10 INJECTION, SOLUTION INTRAVENOUS at 13:21:00

## 2025-03-26 RX ADMIN — SODIUM CHLORIDE: 9 INJECTION, SOLUTION INTRAVENOUS at 08:15:00

## 2025-03-26 RX ADMIN — INDOCYANINE GREEN AND WATER 7.5 MG: 25 MG KIT INJECTION at 12:42:00

## 2025-03-26 RX ADMIN — LIDOCAINE HYDROCHLORIDE 50 MG: 10 INJECTION, SOLUTION EPIDURAL; INFILTRATION; INTRACAUDAL; PERINEURAL at 08:21:00

## 2025-03-26 RX ADMIN — ROCURONIUM BROMIDE 100 MG: 10 INJECTION, SOLUTION INTRAVENOUS at 08:21:00

## 2025-03-26 RX ADMIN — KETAMINE HYDROCHLORIDE 15 MG: 50 INJECTION, SOLUTION INTRAMUSCULAR; INTRAVENOUS at 09:04:00

## 2025-03-26 RX ADMIN — DEXAMETHASONE SODIUM PHOSPHATE 4 MG: 10 INJECTION, SOLUTION INTRAMUSCULAR; INTRAVENOUS at 08:28:00

## 2025-03-26 RX ADMIN — KETOROLAC TROMETHAMINE 15 MG: 30 INJECTION, SOLUTION INTRAMUSCULAR; INTRAVENOUS at 13:20:00

## 2025-03-26 RX ADMIN — LIDOCAINE HYDROCHLORIDE ANHYDROUS AND DEXTROSE MONOHYDRATE 2 MG/KG/HR: .8; 5 INJECTION, SOLUTION INTRAVENOUS at 08:32:00

## 2025-03-26 NOTE — ANESTHESIA PROCEDURE NOTES
Airway  Date/Time: 3/26/2025 8:23 AM  Urgency: elective      General Information and Staff    Patient location during procedure: OR  Anesthesiologist: Zak Rosas MD  Resident/CRNA: Homero Nuñez CRNA  Performed: CRNA   Performed by: Homero Nuñez CRNA  Authorized by: Zak Rosas MD      Indications and Patient Condition  Indications for airway management: anesthesia  Sedation level: deep  Preoxygenated: yes  Patient position: sniffing  Mask difficulty assessment: 1 - vent by mask    Final Airway Details  Final airway type: endotracheal airway      Successful airway: ETT  Cuffed: yes   Successful intubation technique: direct laryngoscopy  Endotracheal tube insertion site: oral  Blade: Rick  ETT size (mm): 7.5    Cormack-Lehane Classification: grade IIA - partial view of glottis  Placement verified by: capnometry   Measured from: lips  ETT to lips (cm): 21  Number of attempts at approach: 1

## 2025-03-26 NOTE — CONSULTS
[facility]     Ilda Gutierrez Patient Status:  Inpatient    1955 MRN [id number]   Location [facility] 3NW-A Attending Hector Galan MD   Hosp Day # 0 PCP Kasey Mendenhall MD     Reason for consult: medical management    Requested by: Hector Galan MD     History of Present Illness: Ilda Gutierrez is a 69 year old female with pmhx significant for rectal/colon cancer, CAD, HTN, HLD, DM2 who was admitted for colon resection. Pt underwent robotic low anterior resection, flex-sig earlier today with no complications. Currently, denies any chest pain/pressure, SOB. Able to tolerate PO and denies any difficulty swallowing. Having some suprapubic pain and reports she feels urge to urinate. Explained that she has bryant catheter present. She reports her post-op abdominal pain is not currently well controlled and she is feeling some nausea.     Past Medical History:  Past Medical History:    Cancer (HCC)    Colon cancer (HCC)    Coronary atherosclerosis    Diabetes (HCC)    High blood pressure    High cholesterol    Hyperlipidemia    Personal history of antineoplastic chemotherapy    LAST TREATMENT 25        Past Surgical History:   Past Surgical History:   Procedure Laterality Date    Cabg      [facility] , Triple bypass    Colonoscopy N/A 10/29/2024    Dr. Anderson; polyps, sigmoid mass, rectal mass, diverticulosis    Colonoscopy N/A 10/29/2024    Procedure: COLONOSCOPY;  Surgeon: Toni Anderson MD;  Location: [facility] ENDOSCOPY    Egd N/A 10/29/2024    Dr. Anderson; hiatal hernia      82    4th child    Spine surgery procedure unlisted         Social History:  reports that she has never smoked. She has never used smokeless tobacco. She reports current alcohol use of about 1.0 standard drink of alcohol per week. She reports that she does not use drugs.    Family History:   Family History   Problem Relation Age of Onset    Breast Cancer Mother 70       Allergies: Allergies[1]    Medications:  Medications Ordered Prior to Encounter[2]    Review of Systems:   A comprehensive 14 point review of systems was completed.    Pertinent positives and negatives noted in the HPI.    Physical Exam:    /76 (BP Location: Right arm)   Pulse 71   Temp 98.7 °F (37.1 °C) (Oral)   Resp 14   Ht 5' 7\" (1.702 m)   Wt 165 lb (74.8 kg)   SpO2 100%   BMI 25.84 kg/m²   General: No acute distress. Alert and oriented x 3.  Respiratory: Clear to auscultation bilaterally. No wheezes. No rhonchi.  Cardiovascular: S1, S2. Regular rate and rhythm. No murmurs, rubs or gallops. Equal pulses.   Abdomen: Soft, expected post-op TTP, nondistended.  Positive bowel sounds. No rebound, guarding or organomegaly. Surgical incisions c/d/i  Neurologic: No focal neurological deficits. CNII-XII grossly intact.  Musculoskeletal: Moves all extremities.  Extremities: No edema or cyanosis.    Diagnostic Data:      Labs:  Recent Labs   Lab 03/21/25  0827   WBC 5.3   HGB 13.3   MCV 95.9   .0       No results for input(s): \"GLU\", \"BUN\", \"CREATSERUM\", \"GFRAA\", \"GFRNAA\", \"CA\", \"ALB\", \"NA\", \"K\", \"CL\", \"CO2\", \"ALKPHO\", \"AST\", \"ALT\", \"BILT\", \"TP\" in the last 168 hours.    No results for input(s): \"PTP\", \"INR\" in the last 168 hours.    COVID-19 Lab Results    COVID-19  Lab Results   Component Value Date    COVID19 Not Detected 11/26/2024       Pro-Calcitonin  No results for input(s): \"PCT\" in the last 168 hours.    Cardiac  No results for input(s): \"TROP\", \"PBNP\" in the last 168 hours.    Creatinine Kinase  No results for input(s): \"CK\" in the last 168 hours.    Inflammatory Markers  No results for input(s): \"CRP\", \"EARNESTINE\", \"LDH\", \"DDIMER\" in the last 168 hours.    Imaging: Imaging data reviewed in Epic.      ASSESSMENT / PLAN:     #Rectal and sigmoid colon cancer  -s/p robotic low anterior resection, flex-sig on 3/26  -IVF, Pain control, anti-emetics PRN  -CLD, ADAT per surgery   -general surgery  primary    #HTN  -resume PTA metoprolol    #HLD  -resume PTA rsouvastatin    #DM2 with hyperglycemia  -hyperglycemia protocol  -ICF 1:40    Quality:  DVT Prophylaxis: DORA, per primary service  CODE status: FULL  Perera: yes    Plan of care discussed with pt, RN    Angely Theodore DO  3/26/2025               [1] No Known Allergies  [2]   Current Facility-Administered Medications on File Prior to Encounter   Medication Dose Route Frequency Provider Last Rate Last Admin    [COMPLETED] gadoterate meglumine (Dotarem) 7.5 MMOL/15ML injection 15 mL  15 mL Intravenous ONCE PRN Kalal, Yari   15 mL at 02/13/25 1155    [COMPLETED] iopamidol 76% (ISOVUE-370) injection for power injector  80 mL Intravenous ONCE PRN Homero Carvalho MD   80 mL at 02/13/25 1227    [COMPLETED] ondansetron (Zofran) 16 mg, dexAMETHasone (Decadron) 20 mg in sodium chloride 0.9% 110 mL IVPB   Intravenous Once Homero Carvalho MD   Stopped at 01/28/25 1140    [COMPLETED] oxaliplatin (Eloxatin) 160 mg in dextrose 5% 282 mL infusion  85 mg/m2 (Treatment Plan Recorded) Intravenous Once Homero Carvalho MD   Stopped at 01/28/25 1356    [COMPLETED] leucovorin 750 mg in dextrose 5% 250 mL infusion  400 mg/m2 (Treatment Plan Recorded) Intravenous Once Homero Carvalho MD   Stopped at 01/28/25 1356    [COMPLETED] fluorouracil (Adrucil) 50 mg/mL IV push 750 mg 15 mL  400 mg/m2 (Treatment Plan Recorded) Intravenous Once Homero Carvalho MD   750 mg at 01/28/25 1414    [COMPLETED] pegfilgrastim-jmdb (Fulphila) 6 MG/0.6ML SUBQ injection 6 mg  6 mg Subcutaneous Once Homero Carvalho MD   6 mg at 01/16/25 1136    [COMPLETED] ondansetron (Zofran) 16 mg, dexAMETHasone (Decadron) 20 mg in sodium chloride 0.9% 110 mL IVPB   Intravenous Once Homero Carvalho MD   Stopped at 01/14/25 1008    [COMPLETED] oxaliplatin (Eloxatin) 160 mg in dextrose 5% 282 mL infusion  85 mg/m2 (Treatment Plan Recorded) Intravenous Once Homero Carvalho MD   Stopped at 01/14/25 1213    [COMPLETED] leucovorin 750  mg in dextrose 5% 250 mL infusion  400 mg/m2 (Treatment Plan Recorded) Intravenous Once Homero Carvalho MD   Stopped at 01/14/25 1213    [COMPLETED] fluorouracil (Adrucil) 50 mg/mL IV push 750 mg 15 mL  400 mg/m2 (Treatment Plan Recorded) Intravenous Once Homero Carvalho MD   750 mg at 01/14/25 1218    [COMPLETED] pegfilgrastim-jmdb (Fulphila) 6 MG/0.6ML SUBQ injection 6 mg  6 mg Subcutaneous Once Negra Velasquez APRN   6 mg at 01/02/25 1254    [COMPLETED] ondansetron (Zofran) 16 mg, dexAMETHasone (Decadron) 20 mg in sodium chloride 0.9% 110 mL IVPB   Intravenous Once Negra Velasquez APRN   Stopped at 12/31/24 1239    [COMPLETED] oxaliplatin (Eloxatin) 160 mg in dextrose 5% 282 mL infusion  85 mg/m2 (Treatment Plan Recorded) Intravenous Once Negra Velasquez APRN   Stopped at 12/31/24 1447    [COMPLETED] leucovorin 750 mg in dextrose 5% 250 mL infusion  400 mg/m2 (Treatment Plan Recorded) Intravenous Once Negra Velasquez APRN   Stopped at 12/31/24 1447    [COMPLETED] fluorouracil (Adrucil) 50 mg/mL IV push 750 mg 15 mL  400 mg/m2 (Treatment Plan Recorded) Intravenous Once Negra Velasquez APRN   750 mg at 12/31/24 1458     Current Outpatient Medications on File Prior to Encounter   Medication Sig Dispense Refill    metFORMIN 500 MG Oral Tab Take 1 tablet (500 mg total) by mouth 2 (two) times daily with meals.      metoprolol tartrate 50 MG Oral Tab Take 1 tablet (50 mg total) by mouth 2x Daily(Beta Blocker). 60 tablet 3    aspirin 81 MG Oral Tab EC Take 1 tablet (81 mg total) by mouth at bedtime.      rosuvastatin 10 MG Oral Tab Take 1 tablet (10 mg total) by mouth nightly.

## 2025-03-26 NOTE — ANESTHESIA PROCEDURE NOTES
Regional Block    Date/Time: 3/26/2025 8:25 AM    Performed by: Homero Nuñez CRNA  Authorized by: Zak Rosas MD      General Information and Staff    Start Time:  3/26/2025 8:25 AM  End Time:  3/26/2025 8:30 AM  Anesthesiologist:  Zak Rosas MD  CRNA:  Homero Nuñez CRNA  Performed by:  CRNA  Patient Location:  OR    Block Placement: Post Induction  Site Identification: real time ultrasound guided and image stored and retrievable    Block site/laterality marked before start: site marked  Reason for Block: at surgeon's request and post-op pain management    Preanesthetic Checklist: 2 patient identifers, IV checked, site marked, risks and benefits discussed, monitors and equipment checked, pre-op evaluation, timeout performed, anesthesia consent, sterile technique used, no prohibitive neurological deficits and no local skin infection at insertion site      Procedure Details    Patient Position:  Supine  Prep: ChloraPrep    Monitoring:  Cardiac monitor, continuous pulse ox, blood pressure cuff and heart rate  Block Type:  TAP  Laterality:  Bilateral  Injection Technique:  Single-shot    Needle    Needle Type:  Short-bevel and echogenic  Needle Gauge:  21 G  Needle Length:  100 mm  Needle Localization:  Ultrasound guidance  Reason for Ultrasound Use: appropriate spread of the medication was noted in real time and no ultrasound evidence of intravascular and/or intraneural injection            Assessment    Injection Assessment:  Good spread noted, negative resistance, negative aspiration for heme, incremental injection and low pressure  Heart Rate Change: No    - Patient tolerated block procedure well without evidence of immediate block related complications.     Medications  3/26/2025 8:25 AM      Additional Comments    Medication:  Bupivacaine 0.25% 40ml + 4mg PF decadron: 20ml per side

## 2025-03-26 NOTE — OPERATIVE REPORT
[facility]  Operative Note    Ilda Gutierrez Location: OR   [facility] [id number] MRN [id number]    1955 Age 69 year old   Admission Date 3/26/2025 Operation Date 3/26/2025   Attending Physician Hector Galan MD Operating Physician Hector Galan MD   PCP Kasey Mendenhall MD          Patient Name: Ilda Gutierrez    Preoperative Diagnosis: Cancer of sigmoid colon (HCC) [C18.7]  Rectal cancer (HCC) [C20]    Postoperative Diagnosis: Same as preoperative diagnosis    Primary Surgeon: Hector Galan MD    Assistant: Cinthia Rodriguez PA-C, Bren Welch PA-C    Anesthesia: General    Procedures: Robotic low anterior resection, flexible sigmoidoscopy    Implants: None    Specimen: Sigmoid colon and partial rectum    Drains: 19 [place] pelvic Cresencio drain    Estimated Blood Loss: 20 cc    Complications: None immediate    Condition: Stable    Colon Resection  Operation performed with curative intent Yes   Tumor Location Sigmoid colon   Extent of colon and vascular resection Sigmoid resection - inferior mesenteric      Colon Resection 2  Operation performed with curative intent Yes   Tumor Location Rectum, NOS   Extent of colon and vascular resection Sigmoid resection - inferior mesenteric        Indications for Surgery:   This is a very nice 69-year-old female with a history of synchronous rectal and sigmoid colon cancer.     Patient had intermittent rectal bleeding in the summer .  She was noted to have iron deficiency anemia.  She underwent a colonoscopy with Dr. Anderson on 10/29/2024.  She was found to have synchronous, short, friable, ulcerated apple core lesions in the distal sigmoid at 21 cm from the anal verge and in the proximal rectum at 7-8 cm from the anal verge.  Each lesion was biopsied.  A tattoo was placed distal to the sigmoid lesion.  Dr. Anderson comments that the rectal lesion was palpable on digital rectal exam.  Pathology from the sigmoid colon mass and rectal mass biopsies both  showed infiltrating moderately differentiated adenocarcinoma, microsatellite stable.     Patient underwent a staging CT scan of the chest, abdomen and pelvis on 11/6/2024 which was negative for any obvious metastatic disease though showed bilateral micronodules in both lungs.  Patient underwent an MRI pelvis for local staging of the rectal cancer which showed a T3b N0 circumferential tumor approximately 8 cm from the anal verge.  Patient has completed 6 cycles of FOLFOX, last cycle on 1/28/2024.  Posttreatment MRI was performed on 2/13/2025 and showed a treatment related inflammation/fibrosis at the site of previously identified rectal tumor without any residual tumor signal identified.     Patient is doing very well at this time.  She is no longer having any rectal bleeding.  She is having regular bowel movements.  She denies any abdominal pain, nausea, vomiting or weight loss.  No prior abdominal or anorectal surgery.  No family history of colon or rectal cancer.  She has had 4 vaginal deliveries and denies any fecal incontinence.     Patient is well-appearing on exam.  I did not appreciate any obvious residual rectal mass on bedside digital rectal exam.  I performed a flexible sigmoidoscopy on 2/25/2025. There was a tattoo identified within the sigmoid colon approximately 25 cm from the anal verge. No obvious mass was identified near the tattoo though there was a large amount of solid stool in this area which greatly limited visualization. There was a residual friable mass on the mid rectal valve approximately 8 cm from the anal verge. This was located along the right anterior side of the rectal wall.     Patient's case was discussed at multidisciplinary rectal cancer tumor board.  Recommendation was to plan for low anterior resection. I recommend planning for robotic low anterior resection, possible open, possible ostomy. The details of this procedure were discussed with patient including the expected recovery  time, risks, benefits and alternatives. Specific risks of surgery that were discussed include but not limited to pain, bleeding, infection, bowel or ureteral injury, bowel dysfunction, urinary dysfunction, sexual dysfunction and anastomotic complications such as leak, bleeding or stricture. There is also a risk of possible ostomy creation at the time of surgery.      I recommend completion of a preoperative bowel prep and oral antibiotics per ERAS protocol. Patients are generally hospitalized for 2-5 days after this type of surgery.  I advise no lifting more than 10-15 pounds for total of 6 weeks after surgery. Patient would remain on a soft/low fiber diet for 4 to 6 weeks after surgery.      I counseled patient that we will not know the pathology staging of the colon and rectal cancer until review of the pathology report after surgery. Patient may need further treatment with chemotherapy or radiation depending on the surgical pathology findings.      Patient expressed understanding and was agreeable to schedule surgery with me.      During review of the patient's MRI pelvis from 2/13/2025, Dr. Estrada (radiology) noted \"the anterior margin of the high rectum near level of treated known rectal cancer abuts the posterior wall of the lower uterine segment.\"     Given this finding, I recommend Dr. Jin from surgical oncology is on standby for possible hysterectomy if there is intraoperative findings suggestive of invasion of the patient's rectal cancer into the posterior uterus.     Patient was agreeable to have Dr. Jin on standby for possible hysterectomy.  Patient wished to meet Dr. Jin the day of her scheduled surgery and did not feel was necessary to have an in-person or telephone consultation with him prior to surgery.    Patient presents for surgery today.  She completed a bowel prep and antibiotics as instructed per ERAS protocol.  Consent was signed.  All questions answered.    Surgical Findings:   No  evidence of metastatic disease on the omental surface, bowel surface, peritoneal surface or liver surface. Redundant sigmoid colon with some adhesions to the left pelvic sidewall.  Tumor in the mid rectum 8 cm from the anal verge.  No evidence of extra-mesorectal tumor spread or attachments to the posterior uterus.    Tattoo and another tumor located in the mid sigmoid colon.  Widely patent, tension-free , well-vascularized colorectal anastomosis at 6 cm from the anal verge with negative intraoperative leak test.     Description of Procedure:   Patient was brought to the operating room and positioned supine on the OR table on a pink foam pad. Bilateral sequential compression devices were placed. Preoperative antibiotics were given. Patient was induced under general endotracheal anesthesia.  The anesthesia team performed a preoperative TAP block.  Patient was positioned in lithotomy.  Both arms were carefully tucked with all pressure points well-padded.  A Perera catheter was inserted using sterile technique.  The abdomen was prepped and draped in the usual sterile fashion.  A timeout was performed.     Pneumoperitoneum was established using a Veress needle through a stab incision at Conner's point.  There was appropriately low opening pressure.  An 8 mm robotic trocar was inserted under direct laparoscopic vision just above and to the right of the umbilicus.  The Veress needle was seen free-floating within the peritoneal cavity and was removed.  There was no evidence of underlying visceral injury. A 12 mm right lower quadrant robotic trocar was placed under direct vision.  Two 8 mm robotic trocars were placed under direct vision, one in the epigastrium and another in the left upper quadrant spaced about 10 cm apart. A 5 mm airseal laparoscopic assistant trocar was placed under direct vision in the right upper abdomen.       The patient was positioned in steep Trendelenburg and left side up. The da Kev Xi robotic  platform was brought into the field and docked. The abdomen was explored laparoscopically.  The liver surface, omentum, bowel and peritoneal surfaces appeared normal.     The sigmoid colon was rather floppy and redundant.  There were some inflammatory adhesions tethering the sigmoid colon to the left pelvic sidewall.  These were divided with hook electrocautery.  I began with lateral to medial mobilization of the descending and sigmoid colon.  The white line of Toldt was sharply incised with hook electrocautery. The descending and sigmoid colon was freed from its retroperitoneal attachments in a lateral to medial fashion using a combination of sharp and blunt dissection. This dissection continued until the sigmoid colon appeared sufficiently mobilized. Any small vessel bleeding was controlled with electrocautery. The left ureter and iliac vessels were identified in the sigmoid fossa and were preserved throughout the dissection.       The rectosigmoid colon was then retracted anteriorly and cephalad out of the pelvis.  The peritoneum was scored along the reflection between the mesorectum and retroperitoneum. The dissection was continued from the right side then proceeded posteriorly then into the left side along the areolar avascular TME plane.  I was able to rendezvous with the previous lateral to medial dissection plane.  The dissection was continued circumferentially into the pelvis along the avascular TME plane. The right ureter was also identified and preserved throughout this dissection.     The mesentery was cleared around the root of the SUNITHA using hook electrocautery and a vessel sealer. The trunk of the SUNITHA was ligated and divided using a robotic 45 mm ROBYN stapler with vascular load     I proceeded with circumferential dissection of the rectum using hook electrocautery and a vessel sealer device along the avascular TME plane.  The anterior wall of the rectum was  from the posterior vagina.   Eventually, the rectum was freed below the peritoneal reflection down to the level of the  lower rectum.  Any small vessel bleeding was controlled with electrocautery.  I performed a flexible sigmoidoscopy and confirmed that I had dissected distal enough to be below the tumor.     A distal transection point was identified in the lower rectum. The mesorectum was partial cleared up to this planned transection site then divided with a vessel sealer.  The rectum was divided using three fires of a robotic 45 mm ROBYN stapler with blue load.  The remaining mesentery was divided up to the planned proximal transection point 5 cm proximal the sigmoid tumor in the proximal sigmoid colon using a vessel sealer.  The sigmoid colon was rather redundant and would reach down to the pelvis and through a Pfannenstiel incision with ease.  The stapled distal edge of the colon specimen was grasped with a locking grasper.      A Pfannenstiel skin incision was made.  The skin incision was deepened down through the subcutaneous fat and anterior fascia using electrocautery.  The anterior fascia was mobilized off the rectus abdominis muscle using electrocautery.   The rectus muscles and peritoneum were divided in the midline with electrocautery at the cephalad portion of the incision and carried down caudad taking care to to identify and not injure the bladder.  An Beau wound retractor was placed.  The colon and rectum specimen was extracorporealized.  We identified our proximal transection point at the proximal sigmoid colon. The mesentery was ligated between 0 Vicryl ties and divided up to the planned transection point. The colon was then cut and the specimen was passed off the field to be sent to pathology.  Palpation of the specimen confirmed adequate proximal and distal margins.     A 2-0 Prolene pursestring suture was placed into the proximal colon conduit. The colon conduit was able to accommodate a 29 mm EEA sizer. Therefore, the  anvil of a 29 mm EEA was used and secured in place using the 2-0 Prolene pursestring suture.  he anvil was further secured using a 0 Vicryl tie.  The fat overlying the staple line was carefully cleared off of the serosa using electrocautery.  The proximal colon with anvil in place was then placed back into the abdomen.     Pneumoperitoneum was re-established. The rectal stump was fairly short and measured around 6 cm in length. The anus was dilated up to 2 fingers and a EEA sizer was passed up to the staple line of the rectal stump.  This was followed by the EEA stapler.  The spike was brought out just to the right of the mid staple line. The anvil was connected to the spike ensuring there was no twisting of the proximal colon conduit, the anvil and spike were brought together, and a stapled anastomosis was performed. The EEA stapler was removed with ease. There were 2 robust, intact donuts seen within the stapler.  The proximal colon was clamped and the anastomosis was examined using a flexible colonoscope.  The pelvis was filled with warm saline solution.  The anastomosis was located approximately 6 cm from the anal verge.  It appeared circumferentially intact, healthy and patent with some oozing but no active bleeding.  There was no bubbling within the pelvis confirming a negative intraoperative leak test. The insufflation was suctioned out of the rectum as the colonoscope was removed.     The abdomen and pelvis were thoroughly irrigated, suctioned and appeared hemostatic. The fascia at the 12 mm trocar site was closed using a single interrupted 0 PDS suture with the assistance of a Richy-Hunter device.  Prior to tying down the fascial suture, a 19 [place] Cresencio drain was positioned through the 12 mm trocar site and positioned in the pelvis.  The patient was leveled.  The fascial suture was tied. The drain was secured in place using a 2-0 nylon suture.       The peritoneum within the Pfannenstiel incision was  closed using a running #1 Vicryl suture. The fascia was closed using a running 0 PDS suture.  Each site appeared hemostatic.  All of the skin incisions were anesthetized using a total of 30 cc of 0.25% Marcaine.  The skin at the Pfannenstiel incision was reapproximated using interrupted 3-0 Vicryl deep dermal sutures. The skin at each incision was closed using 4-0 Monocryl in a subcuticular fashion.  The skin was cleaned and dried and skin glue was placed as a final dressing.  The drain site was dressed with a gauze and tape.    The patient was taken out of lithotomy, awakened from anesthesia, extubated and transported to the postanesthesia care unit in stable condition. All sponge, needle, and instrument counts were correct at the end of the case.  I was present for the entire case.      Hector Galan MD  3/26/2025  2:04 PM

## 2025-03-26 NOTE — DISCHARGE INSTRUCTIONS
Post-Surgical Discharge Instructions    Hector Galan MD      Diet:  Continue on a soft diet until 6 weeks after the date of your surgery.  Soft diet means you can eat whatever you want, with moderation, except for the following items:    Celery                        Coconut              Corn                           Dried Fruit  Green peppers           Lettuce  Mushrooms                Nuts  Olives                         Peas  Pickles                       Pineapple  Popcorn                     Spinach              Raw vegetables  Seeds                        Skins of fruits and vegetables    Activity:  No heavy lifting greater than 10 lbs and no exercising for a total of 6 weeks from the date of surgery.  You may walk and climb stairs with moderation. If your abdomen is more uncomfortable than the day before, you need to be less active as you may be pulling on your stitches.    Bathing:  You may shower as often as you would like, but no submerging the incisions under water for a total of 2 weeks from the date of surgery.  This includes no swimming, no baths, and no hot-tubs.      Wound:  Keep the wound dry.  No dressing is necessary unless there is drainage coming from the wound.  If the drainage is excessive or looks like pus, please call our office.    Driving: You may drive provided that you are no longer taking your narcotic pain medication.      Bowel Function:  After surgery, your bowel movements will be irregular.  It is normal to have up to 3 bowel movements a day or as low as 1 bowel movement every 3 days.  Occasionally, your movements may be even more irregular than this.  As long as you are not vomiting or have a fever over 100.3, you don’t need to be overly concerned.      Return to Work:  Most patients return to work after 1-2 weeks from the date of their surgery.  You will still have a lifting restriction of no greater than 10 lbs from the date of your surgery until 6 weeks.  If your work  requires heavy lifting, you will need to stay off work for 6 weeks.  When you are ready to return to work, please call the office and we will send a work release to your employer.      Appointment: Please call our office for an appointment in 10-14 days after surgery, unless otherwise instructed.  This will allow ample time for the swelling and soreness to resolve before your wound is examined. If you have fevers, chills, or if you are concerned about your wound, please call us immediately at [phone].      Thank you for entrusting us with your care.

## 2025-03-26 NOTE — H&P
New Patient Visit Note       Active Problems      1. Pre-op testing    2. Cancer of sigmoid colon (HCC)    3. Rectal adenocarcinoma (HCC)        Chief Complaint   No chief complaint on file.      History of Present Illness   This is a very nice 69-year-old female with a history of synchronous rectal and sigmoid colon cancer.    Patient had intermittent rectal bleeding in the summer 2024.  She was noted to have iron deficiency anemia.  She underwent a colonoscopy with Dr. Anderson on 10/29/2024.  She was found to have synchronous, short, friable, ulcerated apple core lesions in the distal sigmoid at 21 cm from the anal verge and in the proximal rectum at 7-8 cm from the anal verge.  Each lesion was biopsied.  A tattoo was placed distal to the sigmoid lesion.  Dr. Anderson comments that the rectal lesion was palpable on digital rectal exam.  Pathology from the sigmoid colon mass and rectal mass biopsies both showed infiltrating moderately differentiated adenocarcinoma, microsatellite stable.    Patient underwent a staging CT scan of the chest, abdomen and pelvis on 11/6/2024 that was negative for any obvious metastatic disease though showed bilateral micronodules in both lungs.  Patient underwent an MRI pelvis for local staging of the rectal cancer which showed a T3b N0 circumferential tumor approximately 8 cm from the anal verge.  Patient has completed 6 cycles of FOLFOX, last cycle on 1/28/2024.  Posttreatment MRI was performed on 2/13/2025 and showed a treatment related inflammation/fibrosis at the site of previously identified rectal tumor without any residual tumor signal identified.    Patient is doing very well at this time.  She is no longer having any rectal bleeding.  She is having regular bowel movements.  She denies any abdominal pain, nausea, vomiting or weight loss.  No prior abdominal or anorectal surgery.  No family history of colon or rectal cancer.  She has had 4 vaginal deliveries and denies  any fecal incontinence.    Allergies  Ilda has No Known Allergies.    Past Medical / Surgical / Social / Family History    The past medical and past surgical history have been reviewed by me today.    Past Medical History:    Cancer (HCC)    Colon cancer (HCC)    Coronary atherosclerosis    Diabetes (HCC)    High blood pressure    High cholesterol    Hyperlipidemia    Personal history of antineoplastic chemotherapy    LAST TREATMENT 25     Past Surgical History:   Procedure Laterality Date    Cabg      [facility] , Triple bypass    Colonoscopy N/A 10/29/2024    Dr. Anderson; polyps, sigmoid mass, rectal mass, diverticulosis    Colonoscopy N/A 10/29/2024    Procedure: COLONOSCOPY;  Surgeon: Toni Anderson MD;  Location: [facility] ENDOSCOPY    Egd N/A 10/29/2024    Dr. Anderson; hiatal hernia      82    4th child    Spine surgery procedure unlisted         The family history and social history have been reviewed by me today.    Family History   Problem Relation Age of Onset    Breast Cancer Mother 70     Social History     Socioeconomic History    Marital status:    Tobacco Use    Smoking status: Never    Smokeless tobacco: Never   Vaping Use    Vaping status: Never Used   Substance and Sexual Activity    Alcohol use: Yes     Alcohol/week: 1.0 standard drink of alcohol     Types: 1 Glasses of wine per week     Comment: OCCASIONAL    Drug use: Never   Other Topics Concern    Caffeine Concern No    Exercise No    Seat Belt No    Special Diet No    Stress Concern No    Weight Concern No      No current outpatient medications on file.      Review of Systems  A 10 point review of systems was performed and negative unless otherwise documented per HPI.    Physical Findings   /78 (BP Location: Left arm)   Pulse 74   Temp 98 °F (36.7 °C) (Temporal)   Resp 18   Ht 67\"   Wt 165 lb (74.8 kg)   SpO2 97%   BMI 25.84 kg/m²   Physical Exam  Vitals and nursing note reviewed. Exam  conducted with a chaperone present.   Constitutional:       General: She is not in acute distress.  HENT:      Head: Normocephalic and atraumatic.      Mouth/Throat:      Mouth: Mucous membranes are moist.   Cardiovascular:      Rate and Rhythm: Normal rate and regular rhythm.   Pulmonary:      Effort: Pulmonary effort is normal.   Abdominal:      General: There is no distension.      Palpations: Abdomen is soft.      Tenderness: There is no abdominal tenderness.   Genitourinary:     Comments: Patient examined in the prone jackknife position with medical assistant chaperone present.  External exam of the anus is normal.  Digital rectal exam shows fair tone and good squeeze without any palpable masses, bleeding, drainage or tenderness.  Musculoskeletal:         General: No deformity.   Skin:     General: Skin is warm and dry.   Neurological:      General: No focal deficit present.      Mental Status: She is alert.   Psychiatric:         Mood and Affect: Mood normal.     I have personally reviewed the imaging of patient's recent CT scans of the abdomen and pelvis and MRI pelvis.       Assessment   1. Pre-op testing    2. Cancer of sigmoid colon (HCC)    3. Rectal adenocarcinoma (HCC)        This is a very nice 69-year-old female with a history of synchronous rectal and sigmoid colon cancer.    Patient had intermittent rectal bleeding in the summer 2024.  She was noted to have iron deficiency anemia.  She underwent a colonoscopy with Dr. Anderson on 10/29/2024.  She was found to have synchronous, short, friable, ulcerated apple core lesions in the distal sigmoid at 21 cm from the anal verge and in the proximal rectum at 7-8 cm from the anal verge.  Each lesion was biopsied.  A tattoo was placed distal to the sigmoid lesion.  Dr. Anderson comments that the rectal lesion was palpable on digital rectal exam.  Pathology from the sigmoid colon mass and rectal mass biopsies both showed infiltrating moderately  differentiated adenocarcinoma, microsatellite stable.    Patient underwent a staging CT scan of the chest, abdomen and pelvis on 11/6/2024 that was negative for any obvious metastatic disease though showed bilateral micronodules in both lungs.  Patient underwent an MRI pelvis for local staging of the rectal cancer which showed a T3b N0 circumferential tumor approximately 8 cm from the anal verge.  Patient has completed 6 cycles of FOLFOX, last cycle on 1/28/2024.  Posttreatment MRI was performed on 2/13/2025 and showed a treatment related inflammation/fibrosis at the site of previously identified rectal tumor without any residual tumor signal identified.    Patient is doing very well at this time.  She is no longer having any rectal bleeding.  She is having regular bowel movements.  She denies any abdominal pain, nausea, vomiting or weight loss.  No prior abdominal or anorectal surgery.  No family history of colon or rectal cancer.  She has had 4 vaginal deliveries and denies any fecal incontinence.    Patient is well-appearing on exam today.  I do not appreciate any obvious residual rectal mass on bedside digital rectal exam.    Plan  I recommend planning for a flexible sigmoidoscopy to rule out any residual rectal mass and also to possibly tattoo the area of the rectal scar for possible surgical planning purposes.  I recommend a 2 enema prep.  The details of the procedure were discussed including the prep instructions, risks, benefits and alternatives.  Patient expressed understanding and was agreeable to schedule a flexible sigmoidoscopy procedure with me.  This has been scheduled for 2/25/2025 at [facility] under monitored anesthesia care.     Orders Placed This Encounter   Procedures    CBC, Platelet; No Differential       Imaging & Referrals   VITAL SIGNS  NURSING COMMUNICATION  NURSING COMMUNICATION  NURSING COMMUNICATION  ACCU-CHEK  NOTIFY PHYSICIAN (SPECIFY)  PLACE PIV  HEIGHT AND WEIGHT  INTAKE AND  OUTPUT  CHLORHEXIDINE GLUCONATE CLEANSER  NURSING COMMUNICATION  SKIN PREP  INITIATE ADULT PREOP PROPHYLACTIC ABX PROTOCOL  PLACE SEQUENTIAL COMPRESSION DEVICE  NPO  VITAL SIGNS - NOTIFY PHYSICIAN  NOTIFY PHYSICIAN (SPECIFY)  NOTIFY PHYSICIAN (SPECIFY)  INITIATE ALGORITHM FOR HYPOGLYCEMIA FOR ADULTS (NON-PREG)  NURSING COMMUNICATION    Follow Up  No follow-ups on file.    Hector Galan MD

## 2025-03-26 NOTE — ANESTHESIA POSTPROCEDURE EVALUATION
[facility]    Ilda Gutierrez Patient Status:  Inpatient   Age/Gender 69 year old female MRN [id number]   Location [facility] POST ANESTHESIA CARE UNIT Attending Hector Galan MD   Hosp Day # 0 PCP Kasey Mendenhall MD       Anesthesia Post-op Note    ROBOTIC LAPAROSCOPIC LOW ANTERIOR COLON RESECTION    Procedure Summary       Date: 03/26/25 Room / Location:  MAIN OR 09 / [facility] MAIN OR    Anesthesia Start: 0812 Anesthesia Stop: 1349    Procedure: ROBOTIC LAPAROSCOPIC LOW ANTERIOR COLON RESECTION (Abdomen) Diagnosis:       Cancer of sigmoid colon (HCC)      Rectal cancer (HCC)      (Cancer of sigmoid colon (HCC) [C18.7]Rectal cancer (HCC) [C20])    Surgeons: Hector Galan MD Anesthesiologist: Zak Rosas MD    Anesthesia Type: general ASA Status: 3            Anesthesia Type: general    Vitals Value Taken Time   /79 03/26/25 1354   Temp 98.7 03/26/25 1358   Pulse 70 03/26/25 1358   Resp 14 03/26/25 1358   SpO2 100 % 03/26/25 1358   Vitals shown include unfiled device data.        Patient Location: PACU    Anesthesia Type: general    Airway Patency: patent    Postop Pain Control: adequate    Mental Status: mildly sedated but able to meaningfully participate in the post-anesthesia evaluation    Nausea/Vomiting: none    Cardiopulmonary/Hydration status: stable euvolemic    Complications: no apparent anesthesia related complications    Postop vital signs: stable    Dental Exam: Unchanged from Preop    Patient to be discharged from PACU when criteria met.

## 2025-03-26 NOTE — SPIRITUAL CARE NOTE
Spiritual Care Visit Note    Patient Name: Ilda Gutierrez Date of Spiritual Care Visit: 25   : 1955 Primary Dx: <principal problem not specified>       Referred By: Referral From: Other (Comment)    Spiritual Care Taxonomy:    Intended Effects: Promote a sense of peace    Methods: Offer support    Interventions: Active listening, Ask guided questions, Explain  role    Visit Type/Summary:     - Spiritual Care: Patient and family expressed appreciation for  visit.    Spiritual Care support can be requested via an Epic consult. For urgent/immediate needs, please contact the On Call  at: Edward: ext [phone]

## 2025-03-26 NOTE — ANESTHESIA PROCEDURE NOTES
Peripheral IV  Date/Time: 3/26/2025 8:24 AM  Inserted by: Homero Nuñez CRNA    Placement  Needle size: 16 G  Laterality: right  Location: wrist  Local anesthetic: none  Site prep: alcohol  Technique: anatomical landmarks  Attempts: 1

## 2025-03-26 NOTE — ANESTHESIA PROCEDURE NOTES
Peripheral IV  Date/Time: 3/26/2025 8:39 AM  Inserted by: Homero Nuñez CRNA    Placement  Needle size: 18 G  Laterality: left  Location: forearm  Local anesthetic: none  Site prep: alcohol  Technique: anatomical landmarks  Attempts: 1

## 2025-03-27 PROBLEM — E11.9 DIABETES MELLITUS TYPE 2 IN NONOBESE (HCC): Status: ACTIVE | Noted: 2024-01-25

## 2025-03-27 PROBLEM — E78.5 DYSLIPIDEMIA: Status: ACTIVE | Noted: 2025-03-27

## 2025-03-27 LAB
ANION GAP SERPL CALC-SCNC: 10 MMOL/L (ref 0–18)
BASOPHILS # BLD AUTO: 0 X10(3) UL (ref 0–0.2)
BASOPHILS NFR BLD AUTO: 0 %
BUN BLD-MCNC: 14 MG/DL (ref 9–23)
CALCIUM BLD-MCNC: 9.4 MG/DL (ref 8.7–10.6)
CHLORIDE SERPL-SCNC: 109 MMOL/L (ref 98–112)
CO2 SERPL-SCNC: 22 MMOL/L (ref 21–32)
CREAT BLD-MCNC: 0.8 MG/DL
EGFRCR SERPLBLD CKD-EPI 2021: 80 ML/MIN/1.73M2 (ref 60–?)
EOSINOPHIL # BLD AUTO: 0 X10(3) UL (ref 0–0.7)
EOSINOPHIL NFR BLD AUTO: 0 %
ERYTHROCYTE [DISTWIDTH] IN BLOOD BY AUTOMATED COUNT: 13.5 %
GLUCOSE BLD-MCNC: 120 MG/DL (ref 70–99)
GLUCOSE BLD-MCNC: 134 MG/DL (ref 70–99)
GLUCOSE BLD-MCNC: 141 MG/DL (ref 70–99)
GLUCOSE BLD-MCNC: 141 MG/DL (ref 70–99)
GLUCOSE BLD-MCNC: 147 MG/DL (ref 70–99)
HCT VFR BLD AUTO: 33 %
HGB BLD-MCNC: 10.9 G/DL
IMM GRANULOCYTES # BLD AUTO: 0.03 X10(3) UL (ref 0–1)
IMM GRANULOCYTES NFR BLD: 0.4 %
LYMPHOCYTES # BLD AUTO: 0.62 X10(3) UL (ref 1–4)
LYMPHOCYTES NFR BLD AUTO: 7.5 %
MAGNESIUM SERPL-MCNC: 1.7 MG/DL (ref 1.6–2.6)
MCH RBC QN AUTO: 30.1 PG (ref 26–34)
MCHC RBC AUTO-ENTMCNC: 33 G/DL (ref 31–37)
MCV RBC AUTO: 91.2 FL
MONOCYTES # BLD AUTO: 0.76 X10(3) UL (ref 0.1–1)
MONOCYTES NFR BLD AUTO: 9.2 %
NEUTROPHILS # BLD AUTO: 6.88 X10 (3) UL (ref 1.5–7.7)
NEUTROPHILS # BLD AUTO: 6.88 X10(3) UL (ref 1.5–7.7)
NEUTROPHILS NFR BLD AUTO: 82.9 %
OSMOLALITY SERPL CALC.SUM OF ELEC: 295 MOSM/KG (ref 275–295)
PHOSPHATE SERPL-MCNC: 4.2 MG/DL (ref 2.4–5.1)
PLATELET # BLD AUTO: 175 10(3)UL (ref 150–450)
POTASSIUM SERPL-SCNC: 4.4 MMOL/L (ref 3.5–5.1)
RBC # BLD AUTO: 3.62 X10(6)UL
SODIUM SERPL-SCNC: 141 MMOL/L (ref 136–145)
WBC # BLD AUTO: 8.3 X10(3) UL (ref 4–11)

## 2025-03-27 PROCEDURE — 99232 SBSQ HOSP IP/OBS MODERATE 35: CPT | Performed by: HOSPITALIST

## 2025-03-27 RX ORDER — MAGNESIUM OXIDE 400 MG/1
400 TABLET ORAL ONCE
Status: COMPLETED | OUTPATIENT
Start: 2025-03-27 | End: 2025-03-27

## 2025-03-27 NOTE — PLAN OF CARE
NURSING ADMISSION NOTE      Patient admitted via Cart  Oriented to room.  Safety precautions initiated.  Bed in low position.  Call light in reach.    Pt arrived to floor in stable condition.

## 2025-03-27 NOTE — PROGRESS NOTES
[facility]   part of formerly [facility]     Hospitalist Progress Note     Ilda Gutierrez Patient Status:  Inpatient    1955 MRN [id number]   Location [facility] 3NW-A Attending Hector Galan MD   Hosp Day # 1 PCP Kasey Mendenhall MD     Subjective:   Doing well     Objective:    Review of Systems:   A comprehensive review of systems was completed; pertinent positive and negatives stated in subjective.  Vital signs:  Temp:  [97.8 °F (36.6 °C)-98.9 °F (37.2 °C)] 98.8 °F (37.1 °C)  Pulse:  [66-74] 74  Resp:  [10-17] 17  BP: (125-156)/(57-79) 137/58  SpO2:  [94 %-100 %] 99 %  Physical Exam:    General: No acute distress    Respiratory: no wheezes, no rhonchi  Cardiovascular: S1, S2, RRR  Abdomen: Soft, NT/ND, +BS  Extremities: no edema    Diagnostic Data:    Labs:  Recent Labs   Lab 25  0827 25  0519   WBC 5.3 8.3   HGB 13.3 10.9*   MCV 95.9 91.2   .0 175.0     Recent Labs   Lab 25  0519   *   BUN 14   CREATSERUM 0.80   CA 9.4      K 4.4      CO2 22.0     Estimated Creatinine Clearance: 64.5 mL/min (based on SCr of 0.8 mg/dL).  No results for input(s): \"PTP\", \"INR\" in the last 168 hours.     Microbiology  No results found for this visit on 25.  Imaging: Reviewed in Epic.  Medications:    heparin  5,000 Units Subcutaneous Q8H DORA    famotidine  20 mg Oral BID    Or    famotidine  20 mg Intravenous BID    acetaminophen  1,000 mg Oral Q8H DORA    rosuvastatin  10 mg Oral Nightly    metoprolol tartrate  50 mg Oral 2x Daily(Beta Blocker)    insulin aspart  1-10 Units Subcutaneous TID AC and HS       Assessment & Plan:    #Rectal and sigmoid colon cancer  -s/p robotic low anterior resection, flex-sig on 3/26  -IVF, Pain control, anti-emetics PRN  -ADAT per surgery   -general surgery primary     #HTN  -resume PTA metoprolol     #HLD  -resume PTA statin      #DM2 with hyperglycemia  -hyperglycemia protocol         Supplementary Documentation:   Quality:  DVT  Mechanical Prophylaxis:   SCDs, Early ambuation  DVT Pharmacologic Prophylaxis   Medication    heparin (Porcine) 5000 UNIT/ML injection 5,000 Units                Code Status: Not on file  Perera: No urinary catheter in place  Perera Duration (in days):   Central line:    ARMANDO:   At this point Ms. Gutierrez is expected to be discharge to: home     The 21st Century Cures Act makes medical notes like these available to patients in the interest of transparency. Please be advised this is a medical document. Medical documents are intended to carry relevant information, facts as evident, and the clinical opinion of the practitioner. The medical note is intended as peer to peer communication and may appear blunt or direct. It is written in medical language and may contain abbreviations or verbiage that are unfamiliar.

## 2025-03-27 NOTE — PROGRESS NOTES
Voided, passing gas.  Normotensive, afebrile.  Tolerating PO without nausea  PRN Toradol given, attempting pain regimen with PO PRN meds    Passing gas.  No chest pain.  Soreness to shoulder.    End of shift update:  Bowel movement. Brown, scant blood. Patient denies lightheadedness. Normotensive. Afebrile.  Passing gas.    Tolerating full liquid diet without nausea.

## 2025-03-27 NOTE — PLAN OF CARE
Alert and oriented x4. Endorses severe pain to lumbar of back. MD notified, d/w MD verbally, additional pain management on board, d/w patient and family regarding pain regimen. Nauseated after administration and PO (water, tea) maintained on clear liquids, IV fluids infusing.   ADILENE draining without complication to bulb suction  Perera draining clear yellow urine  Abdominal incisions CDI    Multiple repositioning of patient to relief back pain, hot packs, waffle cushion applied  Normotensive, afebrile, tolerating room air.      Belching, denies flatus

## 2025-03-27 NOTE — PROGRESS NOTES
[facility]  Progress Note    Ilda Gutierrez Patient Status:  Inpatient    1955 MRN [id number]   Location [facility] 3NW-A Attending Hector aGlan MD   Hosp Day # 1 PCP Kasey Mendenhall MD     Subjective:  Patient is resting comfortably in bed today upon examination. She reports generalized abdominal pain, improved with analgesics. She notes she had nausea yesterday after having sips of clears but says she was tolerating clears this morning. Denies passed flatus or bowel movement. Notes she was unable to ambulate yesterday evening. Denies fever or chills.     Objective/Physical Exam:  General: Alert, orientated x3.  Cooperative.  No apparent distress.  Vital Signs:  Blood pressure 137/58, pulse 74, temperature 98.8 °F (37.1 °C), temperature source Oral, resp. rate 17, height 5' 7\" (1.702 m), weight 165 lb (74.8 kg), SpO2 99%.  Wt Readings from Last 3 Encounters:   25 165 lb (74.8 kg)   25 163 lb (73.9 kg)   25 163 lb (73.9 kg)     Lungs: No respiratory distress.  Cardiac: Regular rate and rhythm.   Abdomen:  Soft, non distended, minimal incisional tenderness, with no rebound or guarding.  No peritoneal signs.   Extremities:  No lower extremity edema noted.    Incision: Clean, dry, intact, no erythema  Drain: Serous fluid output.     Intake/Output:    Intake/Output Summary (Last 24 hours) at 3/27/2025 0745  Last data filed at 3/27/2025 0442  Gross per 24 hour   Intake 3390 ml   Output 1495 ml   Net 1895 ml     I/O last 3 completed shifts:  In: 3390 [P.O.:660; I.V.:2730]  Out: 1495 [Urine:1325; Drains:150; Blood:20]  No intake/output data recorded.    Medications:    heparin  5,000 Units Subcutaneous Q8H DORA    famotidine  20 mg Oral BID    Or    famotidine  20 mg Intravenous BID    acetaminophen  1,000 mg Oral Q8H DORA    rosuvastatin  10 mg Oral Nightly    metoprolol tartrate  50 mg Oral 2x Daily(Beta Blocker)    insulin aspart  1-10 Units Subcutaneous TID AC and HS        Labs:  Lab Results   Component Value Date    WBC 8.3 03/27/2025    HGB 10.9 03/27/2025    HCT 33.0 03/27/2025    .0 03/27/2025     Lab Results   Component Value Date     03/27/2025    K 4.4 03/27/2025     03/27/2025    CO2 22.0 03/27/2025    BUN 14 03/27/2025    CREATSERUM 0.80 03/27/2025     03/27/2025    CA 9.4 03/27/2025     Lab Results   Component Value Date    INR 0.09 (A) 11/15/2024    INR 1.27 (H) 06/11/2024    INR 1.04 06/06/2024         Assessment  Patient Active Problem List   Diagnosis    Type 2 diabetes mellitus without complication, without long-term current use of insulin (HCC)    Hypercholesterolemia    Chest pain, precordial    Abnormal stress test    Anemia    Coronary artery disease involving native coronary artery of native heart with unstable angina pectoris (HCC)    S/P CABG x 3    Iron deficiency anemia    Adenocarcinoma of sigmoid colon (HCC)    Rectal adenocarcinoma (HCC)    Rectal cancer (HCC)    Cancer of sigmoid colon (HCC)    Abnormal CT scan of heart    Hypertension    PAF (paroxysmal atrial fibrillation) (HCC)    Pulmonary nodules    Tubular adenoma of colon    Pre-op testing       POD 1 Robotic low anterior resection, flexible sigmoidoscopy     Plan:  Patient is recovering well from surgery.   May advance to full liquid diet as tolerated. Return to sips of clears if patient begins to have nausea or vomiting.   Analgesics and antiemetics as needed.    Recommended incentive spirometry use.   Ambulate and up to chair.  DVT prophylaxis with heparin.  GI prophylaxis with pepcid.     Quality:  DVT Mechanical Prophylaxis:   SCDs, Early ambuation  DVT Pharmacologic Prophylaxis   Medication    heparin (Porcine) 5000 UNIT/ML injection 5,000 Units                Code Status: Not on file  Perera: No urinary catheter in place  Perera Duration (in days):   Central line:    ARMANDO:         Jesusita Wray PA-C  3/27/2025  7:45 AM

## 2025-03-27 NOTE — PLAN OF CARE
A&Ox4.  RA/ Denies chest pain, shortness of breath GI: Abdomen soft, nondistended. Passing some flatus : Perera removed this morning. Up to bathroom & voiding adequately. Pain controlled with PRN pain medications. Drains: Zack with serosanguinous output Incisions: CDI Diet: Clear liquid diet IVF per order. All appropriate safety measures in place.

## 2025-03-28 ENCOUNTER — TELEPHONE (OUTPATIENT)
Dept: FAMILY MEDICINE CLINIC | Facility: CLINIC | Age: 70
End: 2025-03-28

## 2025-03-28 ENCOUNTER — PATIENT OUTREACH (OUTPATIENT)
Dept: CASE MANAGEMENT | Age: 70
End: 2025-03-28

## 2025-03-28 VITALS
BODY MASS INDEX: 25.9 KG/M2 | OXYGEN SATURATION: 99 % | TEMPERATURE: 98 F | HEART RATE: 67 BPM | SYSTOLIC BLOOD PRESSURE: 154 MMHG | DIASTOLIC BLOOD PRESSURE: 70 MMHG | WEIGHT: 165 LBS | HEIGHT: 67 IN | RESPIRATION RATE: 18 BRPM

## 2025-03-28 LAB
ANION GAP SERPL CALC-SCNC: 11 MMOL/L (ref 0–18)
BASOPHILS # BLD AUTO: 0.03 X10(3) UL (ref 0–0.2)
BASOPHILS NFR BLD AUTO: 0.5 %
BUN BLD-MCNC: 12 MG/DL (ref 9–23)
CALCIUM BLD-MCNC: 9.4 MG/DL (ref 8.7–10.6)
CHLORIDE SERPL-SCNC: 110 MMOL/L (ref 98–112)
CO2 SERPL-SCNC: 24 MMOL/L (ref 21–32)
CREAT BLD-MCNC: 0.64 MG/DL
EGFRCR SERPLBLD CKD-EPI 2021: 96 ML/MIN/1.73M2 (ref 60–?)
EOSINOPHIL # BLD AUTO: 0 X10(3) UL (ref 0–0.7)
EOSINOPHIL NFR BLD AUTO: 0 %
ERYTHROCYTE [DISTWIDTH] IN BLOOD BY AUTOMATED COUNT: 13.6 %
GLUCOSE BLD-MCNC: 119 MG/DL (ref 70–99)
GLUCOSE BLD-MCNC: 127 MG/DL (ref 70–99)
GLUCOSE BLD-MCNC: 131 MG/DL (ref 70–99)
HCT VFR BLD AUTO: 32.7 %
HGB BLD-MCNC: 11.1 G/DL
IMM GRANULOCYTES # BLD AUTO: 0.02 X10(3) UL (ref 0–1)
IMM GRANULOCYTES NFR BLD: 0.3 %
LYMPHOCYTES # BLD AUTO: 1.43 X10(3) UL (ref 1–4)
LYMPHOCYTES NFR BLD AUTO: 21.7 %
MAGNESIUM SERPL-MCNC: 1.9 MG/DL (ref 1.6–2.6)
MCH RBC QN AUTO: 30.7 PG (ref 26–34)
MCHC RBC AUTO-ENTMCNC: 33.9 G/DL (ref 31–37)
MCV RBC AUTO: 90.3 FL
MONOCYTES # BLD AUTO: 0.57 X10(3) UL (ref 0.1–1)
MONOCYTES NFR BLD AUTO: 8.6 %
NEUTROPHILS # BLD AUTO: 4.54 X10 (3) UL (ref 1.5–7.7)
NEUTROPHILS # BLD AUTO: 4.54 X10(3) UL (ref 1.5–7.7)
NEUTROPHILS NFR BLD AUTO: 68.9 %
OSMOLALITY SERPL CALC.SUM OF ELEC: 301 MOSM/KG (ref 275–295)
PLATELET # BLD AUTO: 174 10(3)UL (ref 150–450)
POTASSIUM SERPL-SCNC: 3.4 MMOL/L (ref 3.5–5.1)
RBC # BLD AUTO: 3.62 X10(6)UL
SODIUM SERPL-SCNC: 145 MMOL/L (ref 136–145)
WBC # BLD AUTO: 6.6 X10(3) UL (ref 4–11)

## 2025-03-28 PROCEDURE — 99232 SBSQ HOSP IP/OBS MODERATE 35: CPT | Performed by: HOSPITALIST

## 2025-03-28 RX ORDER — POTASSIUM CHLORIDE 1500 MG/1
40 TABLET, EXTENDED RELEASE ORAL ONCE
Status: DISCONTINUED | OUTPATIENT
Start: 2025-03-28 | End: 2025-03-28

## 2025-03-28 RX ORDER — OXYCODONE HYDROCHLORIDE 5 MG/1
5 TABLET ORAL EVERY 6 HOURS PRN
Qty: 15 TABLET | Refills: 0 | Status: SHIPPED | OUTPATIENT
Start: 2025-03-28 | End: 2025-04-07 | Stop reason: ALTCHOICE

## 2025-03-28 NOTE — PROGRESS NOTES
A&Ox4. VSS. RA.   GI: Abdomen soft, nondistended. Passing gas.  Denies nausea.  : Voids.  Pain controlled with PRN pain medications  Up ad terra  Incisions: x5 lap sites with skin glue  Diet: soft, tolerating well  IVF running per order. S.lock  All appropriate safety measures in place. Patient updated on plan of care. All questions and concerns addressed.

## 2025-03-28 NOTE — PROGRESS NOTES
Patient is alert and oriented x 4.   Vital signs stable. Afebrile. On room air. Encouraged use of IS.  On Full liquid diet, tolerating well. ERAS protocol. Denies nausea or vomiting. Abdomen soft, passing gas and belching.  Patient is voiding, adequate amount of urine.   Abdominal lap sites x 5, closed with skin glue, open to air  - Clean/Dry/Intact.  RLQ ADILENE drain to bulb suction with small, serosanguineous leakage at site, new dressing placed.   Patient denies pain at this time.  Ambulating independently.  Medications given per MAR. PIVs - saline locked.  Plan of care discussed with patient; no further questions at this time.  All appropriate safety measures in place. Call light in reach.

## 2025-03-28 NOTE — TELEPHONE ENCOUNTER
Sent as FYI/GAURAV protocol:    Spoke to patient for Transitions of Care call today. Pt aware to call for gen sx f/u appts, as directed. Offered TCM/GAURAV appt with PCP--pt declines--prefers to f/u with gen sx, only, at this time.     TCM/GAURAV appointment needed by 4/04/25.     BOOK BY DATE: 4/11/25        Diagnosis: Cancer of sigmoid colon (HCC) [C18.7]  Rectal cancer (HCC) [C20]  3/26/25 Procedures: Robotic low anterior resection, flexible sigmoidoscopy         Follow-up Appointments:    Follow up WithSpecialtiesDetailsWhyContact Hector Maldonado, MDSurgery, Colon & Rectal, SURGERY, GENERALFollow up in 1 month(s)[address] Re[address] by [facility] [id number] [phone]    EMG GEN SURG PAFollow up in 2 week(s)10-14 [id number] [facility] [id number] [phone]

## 2025-03-28 NOTE — PROGRESS NOTES
[facility]  Progress Note    Ilda Gutierrez Patient Status:  Inpatient    1955 MRN [id number]   Location [facility] 3NW-A Attending Hector Galan MD   Hosp Day # 2 PCP Kasey Mendenhall MD     Subjective:  The patient was seen and examined at bedside. She reports incisional site soreness. She has some leaking around her drain insertion site. She is passing flatus. She was incontienent of stool.  Patient denies seeing blood in the stool.  She is ambulating and urinating.  She had a lot of back pain after surgery.    Objective/Physical Exam:  /62 (BP Location: Left arm)   Pulse 76   Temp 98.4 °F (36.9 °C) (Oral)   Resp 18   Ht 67\"   Wt 165 lb (74.8 kg)   SpO2 98%   BMI 25.84 kg/m²     Intake/Output Summary (Last 24 hours) at 3/28/2025 0736  Last data filed at 3/28/2025 0405  Gross per 24 hour   Intake 1160 ml   Output 2910 ml   Net -1750 ml         General: Alert, oriented x3. No acute distress.  HEENT: Normocephalic, atraumatic. No scleral icterus.  Pulmonary: No respiratory distress, effort normal.   Abdomen: Non-distended, without tympany to percussion. Soft, LLQ tenderness to palpation. No rebound or guarding. No peritoneal signs.   Incision: clean, dry, intact without surrounding erythema or cellulitis. Skin glue is in place  Extremities: No lower extremity edema. No clubbing or cyanosis.   Skin: Warm, dry. No jaundice.   Drain: 80 ml thin sanguinous output in the last 24 hours      Labs:      Lab Results   Component Value Date    INR 0.09 (A) 11/15/2024    INR 1.27 (H) 2024    INR 1.04 2024             Assessment:  Patient Active Problem List   Diagnosis    Diabetes mellitus type 2 in nonobese (HCC)    Hypercholesterolemia    Chest pain, precordial    Abnormal stress test    Anemia    Coronary artery disease involving native coronary artery of native heart with unstable angina pectoris (HCC)    S/P CABG x 3    Iron deficiency anemia    Adenocarcinoma of sigmoid  colon (HCC)    Rectal adenocarcinoma (HCC)    Rectal cancer (HCC)    Cancer of sigmoid colon (HCC)    Abnormal CT scan of heart    Hypertension    PAF (paroxysmal atrial fibrillation) (HCC)    Pulmonary nodules    Tubular adenoma of colon    Pre-op testing    Dyslipidemia       POD 2 robotic low anterior resection for sigmoid and low rectal cancer     Plan:  Advance to soft diet  Anticipate discharge home in the next 24 hours pending ability to tolerate diet  Antiemetics and analgesics as needed  Drain to be removed prior to discharge   Encourage ambulation  Encourage incentive spirometer  DVT prophylaxis with heparin  GI prophylaxis with pepcid  [place] [facility] [place] 2 weeks      The patient was discussed with Dr. Galan , and he is in agreement with the assessment and plan. My total face time with this patient was 25 minutes. Greater than half of the visit was spent in counseling the patient on the above listed diagnoses and treatment options.     Pascale Lockwood PA-C  3/28/2025  7:36 AM    This note was initiated by Pascale Lockwood PA-C.  The PA saw the patient in conjunction with me. The PA performed a history, exam, and developed the assessment and plan. I agree with the mentioned assessment and plan and have provided further documentation of my own, if necessary.    Patient is doing well overall.  She is having expected bowel dysfunction and incontinence.  I expect the incontinence will improve as the stool becomes more solid and with time.  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  We will continue to follow this issue and treat with time.    Anticipate discharge home later today versus tomorrow.  Okay to remove Cresencio drain prior to discharge.  Patient should follow-up with [facility] [place] in around 2 weeks.  I will contact the patient next week once her pathology is back.  All patient questions answered.    Hector Galan,  MD  [facility]

## 2025-03-28 NOTE — PROGRESS NOTES
[facility]   part of [facility]     Hospitalist Progress Note     Ilad Gutierrez Patient Status:  Inpatient    1955 MRN [id number]   Location [facility] 3NW-A Attending Hector Galan MD   Hosp Day # 2 PCP Kasey Mendenhall MD     Subjective:   Doing well     Objective:    Review of Systems:   A comprehensive review of systems was completed; pertinent positive and negatives stated in subjective.  Vital signs:  Temp:  [98.1 °F (36.7 °C)-98.8 °F (37.1 °C)] 98.4 °F (36.9 °C)  Pulse:  [68-82] 76  Resp:  [18-20] 18  BP: (131-154)/(57-94) 136/62  SpO2:  [98 %-100 %] 98 %  Physical Exam:    General: No acute distress    Respiratory: no wheezes, no rhonchi  Cardiovascular: S1, S2, RRR  Abdomen: Soft, NT/ND, +BS  Extremities: no edema    Diagnostic Data:    Labs:  Recent Labs   Lab 25  0827 25  0519   WBC 5.3 8.3   HGB 13.3 10.9*   MCV 95.9 91.2   .0 175.0     Recent Labs   Lab 25  0519   *   BUN 14   CREATSERUM 0.80   CA 9.4      K 4.4      CO2 22.0     Estimated Creatinine Clearance: 64.5 mL/min (based on SCr of 0.8 mg/dL).  No results for input(s): \"PTP\", \"INR\" in the last 168 hours.     Microbiology  No results found for this visit on 25.  Imaging: Reviewed in Epic.  Medications:    heparin  5,000 Units Subcutaneous Q8H DORA    famotidine  20 mg Oral BID    Or    famotidine  20 mg Intravenous BID    acetaminophen  1,000 mg Oral Q8H DORA    rosuvastatin  10 mg Oral Nightly    metoprolol tartrate  50 mg Oral 2x Daily(Beta Blocker)    insulin aspart  1-10 Units Subcutaneous TID AC and HS     Assessment & Plan:    #Rectal and sigmoid colon cancer  -s/p robotic low anterior resection, flex-sig on 3/26  -IVF, Pain control, anti-emetics PRN  -ADAT per surgery   -general surgery primary     #HTN  -resume PTA metoprolol     #HLD  -resume PTA statin      #DM2 with hyperglycemia  -hyperglycemia protocol    DC once ok with Sx          Supplementary  Documentation:   Quality:  DVT Mechanical Prophylaxis:   SCDs, Early ambuation  DVT Pharmacologic Prophylaxis   Medication    heparin (Porcine) 5000 UNIT/ML injection 5,000 Units                Code Status: Not on file  Perera: No urinary catheter in place  Perera Duration (in days):   Central line:    ARMANDO: 3/28/2025  At this point Ms. Gutierrez is expected to be discharge to: home     The 21st Century Cures Act makes medical notes like these available to patients in the interest of transparency. Please be advised this is a medical document. Medical documents are intended to carry relevant information, facts as evident, and the clinical opinion of the practitioner. The medical note is intended as peer to peer communication and may appear blunt or direct. It is written in medical language and may contain abbreviations or verbiage that are unfamiliar.

## 2025-03-28 NOTE — PROGRESS NOTES
Transitions of Care Navigation  Discharge Date: 3/28/25  Contact Date: 3/28/2025    Transitions of Care Assessment:  GAURAV Initial Assessment    General:  Assessment completed with: Patient  Patient Subjective: Pt feeling better, since hospital discharge--lap incisions healing without intractable pain, redness, drainage or bleeding, taking Oxycodone, as needed, for intermittent incisional pain, following soft, low fiber diet, staying hydrated, using IS, as directed, independent with ADLs. Pt denies fever, chills, headache, vision changes, dizziness, nausea, vomiting, diarrhea, bleeding, irregular heartbeat or fast pulse, loss of vision, speech or strength or coordination in any body part, calf pin or swelling, chest pain or shortness of breath at this time--speaking in clear, full, sentences.  Chief Complaint: Diagnosis: Cancer of sigmoid colon (HCC) (C18.7)  Rectal cancer (HCC) (C20)  3/26/25 Procedures: Robotic low anterior resection, flexible sigmoidoscopy  Verify patient name and  with patient/ caregiver: Yes    Hospital Stay/Discharge:  Tell me what you understand of why you were in the hospital or emergency department: planned procedure  Prior to leaving the hospital were your Discharge Instructions reviewed with you?: Yes  Did you receive a copy of your written Discharge Instructions?: Yes  What questions do you have about your Discharge Instructions?: none  Do you feel better or worse since you left the hospital or emergency department?: Better    Follow - Up Appointment:  Do you have a follow-up appointment?: No  Are there any barriers to getting to your follow-up appointment?: No    Home Health/DME:  Prior to leaving the hospital was Home Health (HH) arranged for you?: No     Prior to leaving the hospital or emergency department was Durable Medical Equipment (DME), medical supplies, or infusions arranged for you?: Yes (IS)  Have you received your DME/supplies/infusions?: Yes  Do you have questions about  using your DME/supplies/infusions?: No     Medications/Diet:  Did any of your medications change, during or after your hospital stay or ED visit?: Yes  Do you have your new or updated medications?: Yes  Do you understand what your medications are for and possible side effects?: Yes  Are there any reasons that keep you from taking your medication as prescribed?: No  Any concerns about medication refills?: No    Were you given a different diet per your Discharge Instructions?: Yes  Diet Type: Diet:   Continue on a soft diet until 6 weeks after the date of your surgery. Soft diet means you can eat whatever you want, with moderation, except for the following items: Celery Coconut Corn Dried Fruit Green peppers Lettuce Mushrooms Nuts Olives Peas Pickles Pineapple Popcorn Spinach Raw vegetables Seeds Skins of fruits and vegetables  Reason: s/p robotic low anterior resection, flexible sigmoidoscopy  Are there any barriers to following that diet?: No     Questions/Concerns:  Do you have any questions or concerns that have not been discussed?: No     GAURAV Follow-up Assessment    General:  Assessment completed with: Patient  Patient Subjective: Pt feeling better, since hospital discharge--lap incisions healing without intractable pain, redness, drainage or bleeding, taking Oxycodone, as needed, for intermittent incisional pain, following soft, low fiber diet, staying hydrated, using IS, as directed, independent with ADLs. Pt denies fever, chills, headache, vision changes, dizziness, nausea, vomiting, diarrhea, bleeding, irregular heartbeat or fast pulse, loss of vision, speech or strength or coordination in any body part, calf pin or swelling, chest pain or shortness of breath at this time--speaking in clear, full, sentences.  Chief Complaint: Diagnosis: Cancer of sigmoid colon (HCC) (C18.7)  Rectal cancer (HCC) (C20)  3/26/25 Procedures: Robotic low anterior resection, flexible sigmoidoscopy  Community Resources: Other  (none)    Progress/Care Plan:  Is the patient progressing as planned?: Yes  Frequency/Follow Up Plan: Patient has met goals, no further outreach needed.     Nursing Interventions: Discussed diet, activity, medications and need for f/u visits. Pt aware to call for gen sx f/u appts, as directed. Offered TCM/GAURAV appt with PCP--pt declines--prefers to f/u with gen sx, only, at this time.  Patient has met goals, no further outreach needed. Sent TE to office staff as FYI/GAURAV protocol.  Patient aware when to contact PCP/specialists and when to seek emergency care. No further questions/concerns at this time.     Medications:  Medication Reconciliation:  I am aware of an inpatient discharge within the last 30 days.  The discharge medication list has been reconciled with the patient's current medication list and reviewed by me. See medication list for additions of new medication, and changes to current doses of medications and discontinued medications.  Current Outpatient Medications   Medication Sig Dispense Refill    oxyCODONE 5 MG Oral Tab Take 1 tablet (5 mg total) by mouth every 6 (six) hours as needed. 15 tablet 0    metFORMIN 500 MG Oral Tab Take 1 tablet (500 mg total) by mouth 2 (two) times daily with meals.      metoprolol tartrate 50 MG Oral Tab Take 1 tablet (50 mg total) by mouth 2x Daily(Beta Blocker). 60 tablet 3    aspirin 81 MG Oral Tab EC Take 1 tablet (81 mg total) by mouth at bedtime.      rosuvastatin 10 MG Oral Tab Take 1 tablet (10 mg total) by mouth nightly.     START taking: oxyCODONE     Follow-up Appointments:    Follow up WithSpecialtiesDetailsWhyContact Hector Maldonado, MDSurgery, Colon & Rectal, SURGERY, GENERALFollow up in 1 month(s)[address] [facility] [address] [phone]    [facility] GEN SURG PAFollow up in 2 week(s)10-14 [id number] [facility] [address] [phone]      Transitional [facility] Clinic  Was TCC Ordered: No    Primary Care Provider (If no TCC appointment)  Does  patient already have a PCP appointment scheduled? No  Nurse Care Manager Attempted to schedule PCP office TCM/GAURAV appointment with patient   -If no appointment scheduled: Explain pt declines    Specialist  Does the patient have any other follow-up appointment(s) need to be scheduled? Yes   -If yes: Nurse Care Manager reviewed upcoming specialist appointments with patient: Yes   -Does the patient need assistance scheduling appointment(s): No    Book By Date: 4/04/2025

## 2025-03-31 NOTE — DISCHARGE SUMMARY
[facility]  Discharge Summary    Ilda Gutierrez Patient Status:  Inpatient    1955 MRN [id number]   Location [facility] 3NW-A Attending No att. providers found   Hosp Day # 2 PCP Kasey Mendenhall MD     Date of Admission: 3/26/2025    Date of Discharge: 3/28/2025    Admitting Diagnosis: Cancer of sigmoid colon (HCC) [C18.7]  Rectal cancer (HCC) [C20]       Patient Active Problem List   Diagnosis    Diabetes mellitus type 2 in nonobese (HCC)    Hypercholesterolemia    Chest pain, precordial    Abnormal stress test    Anemia    Coronary artery disease involving native coronary artery of native heart with unstable angina pectoris (HCC)    S/P CABG x 3    Iron deficiency anemia    Adenocarcinoma of sigmoid colon (HCC)    Rectal adenocarcinoma (HCC)    Rectal cancer (HCC)    Cancer of sigmoid colon (HCC)    Abnormal CT scan of heart    Hypertension    PAF (paroxysmal atrial fibrillation) (HCC)    Pulmonary nodules    Tubular adenoma of colon    Pre-op testing    Dyslipidemia       Reason for Admission:  Cancer of sigmoid colon (HCC) [C18.7]  Rectal cancer (HCC) [C20]     Procedures: ROBOTIC LAPAROSCOPIC LOW ANTERIOR COLON RESECTION, FLEXIBLE  SIGMOIDOSCOPY    History of Present Illness: Cancer of sigmoid colon (HCC) [C18.7]  Rectal cancer (HCC) [C20]    This is a very nice 69-year-old female with a history of synchronous rectal and sigmoid colon cancer.     Patient had intermittent rectal bleeding in the summer .  She was noted to have iron deficiency anemia.  She underwent a colonoscopy with Dr. Anderson on 10/29/2024.  She was found to have synchronous, short, friable, ulcerated apple core lesions in the distal sigmoid at 21 cm from the anal verge and in the proximal rectum at 7-8 cm from the anal verge.  Each lesion was biopsied.  A tattoo was placed distal to the sigmoid lesion.  Dr. Anderson comments that the rectal lesion was palpable on digital rectal exam.  Pathology from the  sigmoid colon mass and rectal mass biopsies both showed infiltrating moderately differentiated adenocarcinoma, microsatellite stable.     Patient underwent a staging CT scan of the chest, abdomen and pelvis on 11/6/2024 that was negative for any obvious metastatic disease though showed bilateral micronodules in both lungs.  Patient underwent an MRI pelvis for local staging of the rectal cancer which showed a T3b N0 circumferential tumor approximately 8 cm from the anal verge.  Patient has completed 6 cycles of FOLFOX, last cycle on 1/28/2024.  Posttreatment MRI was performed on 2/13/2025 and showed a treatment related inflammation/fibrosis at the site of previously identified rectal tumor without any residual tumor signal identified.     Patient is doing very well at this time.  She is no longer having any rectal bleeding.  She is having regular bowel movements.  She denies any abdominal pain, nausea, vomiting or weight loss.  No prior abdominal or anorectal surgery.  No family history of colon or rectal cancer.  She has had 4 vaginal deliveries and denies any fecal incontinence.    Hospital Course: The patient tolerated the above listed procedure without complication. The patient is being discharged with the following physical exam.      Physical Exam: Abdomen soft, non-tender, bowel sounds present in all four quadrants. Incisions clean, dry, intact, no erythema.    Consultations: Hospitalist    Complications: none     Disposition: Home to self care     Discharge Condition: Good    Discharge Medications:   Discharge Medication List as of 3/28/2025 12:49 PM        START taking these medications    Details   oxyCODONE 5 MG Oral Tab Take 1 tablet (5 mg total) by mouth every 6 (six) hours as needed., Normal, Disp-15 tablet, R-0           CONTINUE these medications which have NOT CHANGED    Details   metFORMIN 500 MG Oral Tab Take 1 tablet (500 mg total) by mouth 2 (two) times daily with meals., Historical       metoprolol tartrate 50 MG Oral Tab Take 1 tablet (50 mg total) by mouth 2x Daily(Beta Blocker)., Normal, Disp-60 tablet, R-3      aspirin 81 MG Oral Tab EC Take 1 tablet (81 mg total) by mouth at bedtime., Historical      rosuvastatin 10 MG Oral Tab Take 1 tablet (10 mg total) by mouth nightly., Historical             Follow up Visits: Follow-up with MIGUEL ANGEL JAMES in 2 weeks and Dr. Galan in 1 month.     Other Discharge Instructions:    Soft diet  No lifting, no driving, the patient may shower  Oxycdone for pain  Drain removed prior to discharge    Pascale Lockwood PA-C  3/31/2025  8:12 AM

## 2025-03-31 NOTE — TELEPHONE ENCOUNTER
Called and discussed importance of hospital follow up with patient.     Appointment made for 4/7/25 for hospital follow up with Dr. Mendenhall.    Patient agrees to plan and verbalized an understanding.

## 2025-04-01 NOTE — PAYOR COMM NOTE
--------------  DISCHARGE REVIEW    Payor: [employer] PPO  Subscriber #:  [id number]  Authorization Number: [id number]    Admit date: 3/26/25  Admit time:   5:28 AM  Discharge Date: 3/28/2025  2:00 PM     Admitting Physician: Hector Galan MD  Attending Physician:  Claudia att. providers found  Primary Care Physician: Kasey Mendenhall MD          Date of Admission: 3/26/2025    Date of Discharge: 3/28/2025    Admitting Diagnosis: Cancer of sigmoid colon (HCC) [C18.7]  Rectal cancer (HCC) [C20]       Patient Active Problem List   Diagnosis    Diabetes mellitus type 2 in nonobese (HCC)    Hypercholesterolemia    Chest pain, precordial    Abnormal stress test    Anemia    Coronary artery disease involving native coronary artery of native heart with unstable angina pectoris (HCC)    S/P CABG x 3    Iron deficiency anemia    Adenocarcinoma of sigmoid colon (HCC)    Rectal adenocarcinoma (HCC)    Rectal cancer (HCC)    Cancer of sigmoid colon (HCC)    Abnormal CT scan of heart    Hypertension    PAF (paroxysmal atrial fibrillation) (HCC)    Pulmonary nodules    Tubular adenoma of colon    Pre-op testing    Dyslipidemia     Procedures: ROBOTIC LAPAROSCOPIC LOW ANTERIOR COLON RESECTION, FLEXIBLE  SIGMOIDOSCOPY    History of Present Illness: Cancer of sigmoid colon (HCC) [C18.7]  Rectal cancer (HCC) [C20]    This is a very nice 69-year-old female with a history of synchronous rectal and sigmoid colon cancer.     Patient had intermittent rectal bleeding in the summer 2024.  She was noted to have iron deficiency anemia.  She underwent a colonoscopy with Dr. Anderson on 10/29/2024.  She was found to have synchronous, short, friable, ulcerated apple core lesions in the distal sigmoid at 21 cm from the anal verge and in the proximal rectum at 7-8 cm from the anal verge.  Each lesion was biopsied.  A tattoo was placed distal to the sigmoid lesion.  Dr. Anderson comments that the rectal lesion was palpable on  digital rectal exam.  Pathology from the sigmoid colon mass and rectal mass biopsies both showed infiltrating moderately differentiated adenocarcinoma, microsatellite stable.     Patient underwent a staging CT scan of the chest, abdomen and pelvis on 11/6/2024 that was negative for any obvious metastatic disease though showed bilateral micronodules in both lungs.  Patient underwent an MRI pelvis for local staging of the rectal cancer which showed a T3b N0 circumferential tumor approximately 8 cm from the anal verge.  Patient has completed 6 cycles of FOLFOX, last cycle on 1/28/2024.  Posttreatment MRI was performed on 2/13/2025 and showed a treatment related inflammation/fibrosis at the site of previously identified rectal tumor without any residual tumor signal identified.     Patient is doing very well at this time.  She is no longer having any rectal bleeding.  She is having regular bowel movements.  She denies any abdominal pain, nausea, vomiting or weight loss.  No prior abdominal or anorectal surgery.  No family history of colon or rectal cancer.  She has had 4 vaginal deliveries and denies any fecal incontinence.    Hospital Course: The patient tolerated the above listed procedure without complication. The patient is being discharged with the following physical exam.

## 2025-04-07 ENCOUNTER — OFFICE VISIT (OUTPATIENT)
Dept: FAMILY MEDICINE CLINIC | Facility: CLINIC | Age: 70
End: 2025-04-07
Payer: MEDICARE

## 2025-04-07 VITALS
DIASTOLIC BLOOD PRESSURE: 70 MMHG | HEIGHT: 67 IN | BODY MASS INDEX: 24.48 KG/M2 | WEIGHT: 156 LBS | SYSTOLIC BLOOD PRESSURE: 114 MMHG | RESPIRATION RATE: 18 BRPM | HEART RATE: 68 BPM | TEMPERATURE: 97 F

## 2025-04-07 DIAGNOSIS — I10 HYPERTENSION, UNSPECIFIED TYPE: ICD-10-CM

## 2025-04-07 DIAGNOSIS — E78.00 HYPERCHOLESTEROLEMIA: ICD-10-CM

## 2025-04-07 DIAGNOSIS — Z09 HOSPITAL DISCHARGE FOLLOW-UP: Primary | ICD-10-CM

## 2025-04-07 DIAGNOSIS — E11.9 TYPE 2 DIABETES MELLITUS WITHOUT COMPLICATION, WITHOUT LONG-TERM CURRENT USE OF INSULIN (HCC): ICD-10-CM

## 2025-04-07 PROCEDURE — 1111F DSCHRG MED/CURRENT MED MERGE: CPT | Performed by: STUDENT IN AN ORGANIZED HEALTH CARE EDUCATION/TRAINING PROGRAM

## 2025-04-07 PROCEDURE — 3078F DIAST BP <80 MM HG: CPT | Performed by: STUDENT IN AN ORGANIZED HEALTH CARE EDUCATION/TRAINING PROGRAM

## 2025-04-07 PROCEDURE — 3074F SYST BP LT 130 MM HG: CPT | Performed by: STUDENT IN AN ORGANIZED HEALTH CARE EDUCATION/TRAINING PROGRAM

## 2025-04-07 PROCEDURE — 99215 OFFICE O/P EST HI 40 MIN: CPT | Performed by: STUDENT IN AN ORGANIZED HEALTH CARE EDUCATION/TRAINING PROGRAM

## 2025-04-07 PROCEDURE — G2211 COMPLEX E/M VISIT ADD ON: HCPCS | Performed by: STUDENT IN AN ORGANIZED HEALTH CARE EDUCATION/TRAINING PROGRAM

## 2025-04-07 PROCEDURE — 1159F MED LIST DOCD IN RCRD: CPT | Performed by: STUDENT IN AN ORGANIZED HEALTH CARE EDUCATION/TRAINING PROGRAM

## 2025-04-07 PROCEDURE — 1160F RVW MEDS BY RX/DR IN RCRD: CPT | Performed by: STUDENT IN AN ORGANIZED HEALTH CARE EDUCATION/TRAINING PROGRAM

## 2025-04-07 PROCEDURE — 1170F FXNL STATUS ASSESSED: CPT | Performed by: STUDENT IN AN ORGANIZED HEALTH CARE EDUCATION/TRAINING PROGRAM

## 2025-04-07 PROCEDURE — 3008F BODY MASS INDEX DOCD: CPT | Performed by: STUDENT IN AN ORGANIZED HEALTH CARE EDUCATION/TRAINING PROGRAM

## 2025-04-07 RX ORDER — ROSUVASTATIN CALCIUM 10 MG/1
10 TABLET, COATED ORAL NIGHTLY
Qty: 90 TABLET | Refills: 1 | Status: SHIPPED | OUTPATIENT
Start: 2025-04-07

## 2025-04-07 RX ORDER — METOPROLOL TARTRATE 50 MG
50 TABLET ORAL
Qty: 60 TABLET | Refills: 3 | Status: SHIPPED | OUTPATIENT
Start: 2025-04-07

## 2025-04-07 RX ORDER — BLOOD SUGAR DIAGNOSTIC
STRIP MISCELLANEOUS
Qty: 100 STRIP | Refills: 3 | Status: SHIPPED | OUTPATIENT
Start: 2025-04-07 | End: 2026-04-07

## 2025-04-07 NOTE — PATIENT INSTRUCTIONS
Refill policies:      Allow 3 business days for refills; controlled substances may take longer.  Contact your pharmacy at least 5-7 business days prior to running out of medication and have them send an electronic request or submit through the \"request refill\" option thru your [employer] account. No need to do both, as multiple requests will create an automated [employer] message to notify of a denial for one of the duplicated requests, causing you undue confusion.   Refills are NOT addressed on weekends; covering physicians do not authorize routine medications on weekends.  No narcotics or controlled substances are refilled after noon on Fridays or by on call physicians.  By law, narcotics cannot be faxed or phoned into your pharmacy.  If your prescription is due for a refill, you may be due for a follow up appointment. Please call our office at [phone] to make an appointment or schedule an appointment via [employer].  To best provide you care, patients receiving routine medications need to be seen at least twice a year. Patients receiving narcotic/controlled substance medications need to be seen at least once every 3 months.  In the event that your preferred pharmacy does not have the requested medication in stock (ie Backordered), it is your responsibility to find another pharmacy that has the requested medication available. We will gladly send a new prescription to that pharmacy at your request.  controlled substances may not be able to be filled out of state due to license restrictions.  If you have a planned trip, it's best to call your pharmacy at least 5-7 business days to prevent any delays in your medication refill.    Scheduling Tests:    If your physician has ordered radiology tests such as MRI or CT scans, please contact Central Scheduling at [phone] right away to schedule the test.  Once scheduled, the [facility] Centralized Referral Team will work with your insurance carrier to obtain pre-certification or  prior authorization.  Depending on your insurance carrier, approval may take 3-10 days.  It is highly recommended patients assure they have received an authorization before having a test performed.  If test is done without insurance authorization, patient may be responsible for the entire amount billed.      Precertification and Prior Authorizations:  If your physician has recommended that you have a procedure or additional testing performed the [facility] Centralized Referral Team will contact your insurance carrier to obtain pre-certification or prior authorization.    You are strongly encouraged to contact your insurance carrier to verify that your procedure/test has been approved and is a COVERED benefit.  Although the [facility] Centralized Referral Team does its due diligence, the insurance carrier gives the disclaimer that \"Although the procedure is authorized, this does not guarantee payment.\"    Ultimately the patient is responsible for payment.   Thank you for your understanding in this matter.  Paperwork Completion:  If you require FMLA or disability paperwork for your recovery, please make sure to either drop it off or have it faxed to our office at [phone]. Be sure the form has your name and date of birth on it.  The form will be faxed to our Forms Department and they will complete it for you.  There is a 25$ fee for all forms that need to be filled out.  Please be aware there is a 10-14 day turnaround time.  You will need to sign a release of information (KEVIN) form if your paperwork does not come with one.  You may call the Forms Department with any questions at [phone].  Their fax number is [phone].

## 2025-04-07 NOTE — PROGRESS NOTES
[facility] Family Medicine Note  04/07/25    Chief Complaint   Patient presents with    Hospital F/U     HPI:   Ilda Gutierrez is a 69 year old female who presents for hospital follow up.    Has upcoming postop appointment this week and in two weeks. Having a lot of pain. Poor sleep. Taking extra strength tylenol. Using heating pad. Light diet so far. Hydrating well. She is cooking for her family still.    Her daughter will help her with reading the medical reports.    Pharmacy will be [facility].     Patient presents for diabetes follow up. Patient has been taking medications as prescribed. Working on diet and exercise. One episode of lightheadedness on Friday but had some honey and it helped.  DM Meds: metFORMIN Tabs - 500 MG twice a dayMeds : Kept the same    Lab Results   Component Value Date    A1C 6.2 (H) 09/11/2024    A1C 6.6 (H) 05/31/2024    A1C 6.5 (H) 01/16/2024    A1C 5.9 (H) 08/23/2017    A1C 5.7 (H) 05/05/2017      Last Diabetic Eye Exam:  Last Dilated Eye Exam: 10/21/24  Eye Exam shows Diabetic Retinopathy?: No    Patient presents for recheck of her hypertension. Pt has been taking medications as instructed, no medication side effects, home BP has been good.  BP Meds: metoprolol tartrate Tabs - 50 MG twice daily    Last Cr was 0.64 done on 3/28/2025.Last eGFR was 96 on 3/28/2025.    Patient presents for follow up of hyperlipidemia. Patient has been taking medications as prescribed, no muscle aches noted. Working on diet and exercise.   Cholesterol Meds: rosuvastatin Tabs - 10 MG   Cholesterol: 124, done on 9/11/2024.  HDL Cholesterol: 46, done on 9/11/2024.  LDL Cholesterol: 61, done on 9/11/2024.  TriGlycerides 86, done on 9/11/2024.      Wt Readings from Last 6 Encounters:   04/07/25 156 lb (70.8 kg)   03/26/25 165 lb (74.8 kg)   02/25/25 163 lb (73.9 kg)   02/17/25 163 lb (73.9 kg)   01/28/25 163 lb 6.4 oz (74.1 kg)   01/14/25 161 lb 6.4 oz (73.2 kg)       Past Medical History:     Cancer (HCC)    Colon cancer (HCC)    Coronary atherosclerosis    Diabetes (HCC)    High blood pressure    High cholesterol    Hyperlipidemia    Personal history of antineoplastic chemotherapy    LAST TREATMENT 25     Past Surgical History:   Procedure Laterality Date    Cabg      [facility] , Triple bypass    Colonoscopy N/A 10/29/2024    Dr. Anderson; polyps, sigmoid mass, rectal mass, diverticulosis    Colonoscopy N/A 10/29/2024    Procedure: COLONOSCOPY;  Surgeon: Toni Anderson MD;  Location: [facility] ENDOSCOPY    Egd N/A 10/29/2024    Dr. Anderson; hiatal hernia      82    4th child    Spine surgery procedure unlisted       Allergies[1]   Glucose Blood (ONETOUCH VERIO) In Vitro Strip Use as directed to test blood sugar once daily. 100 strip 3    metFORMIN 500 MG Oral Tab Take 1 tablet (500 mg total) by mouth 2 (two) times daily with meals. 90 tablet 1    metoprolol tartrate 50 MG Oral Tab Take 1 tablet (50 mg total) by mouth 2x Daily(Beta Blocker). 60 tablet 3    rosuvastatin 10 MG Oral Tab Take 1 tablet (10 mg total) by mouth nightly. 90 tablet 1    aspirin 81 MG Oral Tab EC Take 1 tablet (81 mg total) by mouth at bedtime.       Social History     Socioeconomic History    Marital status:    Tobacco Use    Smoking status: Never    Smokeless tobacco: Never   Vaping Use    Vaping status: Never Used   Substance and Sexual Activity    Alcohol use: Yes     Alcohol/week: 1.0 standard drink of alcohol     Types: 1 Glasses of wine per week     Comment: OCCASIONAL    Drug use: Never   Other Topics Concern    Caffeine Concern No    Exercise No    Seat Belt No    Special Diet No    Stress Concern No    Weight Concern No     Social Drivers of Health     Food Insecurity: No Food Insecurity (3/26/2025)    NCSS - Food Insecurity     Worried About Running Out of Food in the Last Year: No     Ran Out of Food in the Last Year: No   Transportation Needs: No Transportation Needs  (3/26/2025)    NCSS - Transportation     Lack of Transportation: No   Housing Stability: Not At Risk (3/26/2025)    NCSS - Housing/Utilities     Has Housing: Yes     Worried About Losing Housing: No     Unable to Get Utilities: No     Counseling given: Not Answered    Family History   Problem Relation Age of Onset    Breast Cancer Mother 70     Family Status   Relation Status    Mo         REVIEW OF SYSTEMS:   See HPI    EXAM:   /70   Pulse 68   Temp 97.3 °F (36.3 °C) (Temporal)   Resp 18   Ht 5' 7\" (1.702 m)   Wt 156 lb (70.8 kg)   BMI 24.43 kg/m²  Estimated body mass index is 24.43 kg/m² as calculated from the following:    Height as of this encounter: 5' 7\" (1.702 m).    Weight as of this encounter: 156 lb (70.8 kg).   Vital signs reviewed. Appears stated age, well groomed.  Physical Exam:  GEN:  Patient is alert and oriented x3, no apparent distress  HEAD:  Normocephalic, atraumatic  HEENT:  no scleral icterus, conjunctivae clear bilaterally, EOMI, PERRLA, OP clear  LUNGS: clear to auscultation bilaterally, no rales/rhonchi/wheezing  HEART:  Regular rate and rhythm, normal S1/S2, no murmurs, rubs or gallops  ABDOMEN:  Bowel sounds normal, soft, nondistended, nontender, no hepatosplenomegaly  SKIN: incisions c/d/I, scattered bruising on abdomen is fading   EXTREMITIES:  Moves all extremities well  NEURO:  CN 2 - 12 grossly intact, gait normal        ASSESSMENT AND PLAN:   1. Hospital discharge follow-up  Progressing as expected  Keep upcoming surgical follow up    2. Type 2 diabetes mellitus without complication, without long-term current use of insulin (HCC)  Due for labs soon  Continue metformin 500mg BID, take with food, plenty of water  Follow up 6mo or sooner if needed  - Glucose Blood (ONETOUCH VERIO) In Vitro Strip; Use as directed to test blood sugar once daily.  Dispense: 100 strip; Refill: 3  - CMP Now; Future  - Lipids Now; Future  - Hemoglobin A1C Now; Future  - Microalb/Creat  Ratio, Random Urine Now; Future  - metFORMIN 500 MG Oral Tab; Take 1 tablet (500 mg total) by mouth 2 (two) times daily with meals.  Dispense: 90 tablet; Refill: 1    3. Hypercholesterolemia  Patient presents for follow up of HLD  - no side effects of medications  - will continue current regimen  - low fat diet and exercise  - follow up in 6mo or sooner if needed  - rosuvastatin 10 MG Oral Tab; Take 1 tablet (10 mg total) by mouth nightly.  Dispense: 90 tablet; Refill: 1    4. Hypertension, unspecified type  Pt presents for follow up of HTN  - no red flag symptoms  - BP controlled  - continue current regimen  - RTC 6mo or sooner if needed  - metoprolol tartrate 50 MG Oral Tab; Take 1 tablet (50 mg total) by mouth 2x Daily(Beta Blocker).  Dispense: 60 tablet; Refill: 3        Meds & Refills for this Visit:  Requested Prescriptions     Signed Prescriptions Disp Refills    Glucose Blood (ONETOUCH VERIO) In Vitro Strip 100 strip 3     Sig: Use as directed to test blood sugar once daily.    metFORMIN 500 MG Oral Tab 90 tablet 1     Sig: Take 1 tablet (500 mg total) by mouth 2 (two) times daily with meals.    metoprolol tartrate 50 MG Oral Tab 60 tablet 3     Sig: Take 1 tablet (50 mg total) by mouth 2x Daily(Beta Blocker).    rosuvastatin 10 MG Oral Tab 90 tablet 1     Sig: Take 1 tablet (10 mg total) by mouth nightly.       Start Taking               Glucose Blood (ONETOUCH VERIO) In Vitro Strip Use as directed to test blood sugar once daily.          Stop Taking                oxyCODONE 5 MG Oral Tab    Take 1 tablet (5 mg total) by mouth every 6 (six) hours as needed.            Health Maintenance:  Health Maintenance Due   Topic Date Due    Pneumococcal Vaccine: 50+ Years (1 of 2 - PCV) Never done    Zoster Vaccines (1 of 2) Never done    COVID-19 Vaccine (5 - 2024-25 season) 09/01/2024    Annual Well Visit  Never done    Diabetes Care: Foot Exam (Annual)  01/01/2025    Diabetes Care: Microalb/Creat Ratio (Annual)   01/01/2025    Diabetes Care A1C  03/11/2025       Patient/Caregiver Education: There are no barriers to learning. Medical education done.   Outcome: Patient verbalizes understanding. Patient is notified to call with any questions, complications, allergies, or worsening or changing symptoms.  Patient is to call with any side effects or complications from the treatments as a result of today.     Problem List:  Patient Active Problem List   Diagnosis    Diabetes mellitus type 2 in nonobese (HCC)    Hypercholesterolemia    Chest pain, precordial    Abnormal stress test    Anemia    Coronary artery disease involving native coronary artery of native heart with unstable angina pectoris (HCC)    S/P CABG x 3    Iron deficiency anemia    Adenocarcinoma of sigmoid colon (HCC)    Rectal adenocarcinoma (HCC)    Rectal cancer (HCC)    Cancer of sigmoid colon (HCC)    Abnormal CT scan of heart    Hypertension    PAF (paroxysmal atrial fibrillation) (HCC)    Pulmonary nodules    Tubular adenoma of colon    Pre-op testing    Dyslipidemia       Return in about 6 months (around 10/7/2025) for Medicare Annual Wellness Visit, med check.      Kasey Mendenhall MD  [facility]  04/07/25      Please note that portions of this note may have been completed with a voice recognition program. Efforts were made to edit the dictations but occasionally words are mis-transcribed. Thank you for your understanding.    The 21st Century Cures Act makes medical notes like these available to patients in the interest of transparency. Please be advised this is a medical document. Medical documents are intended to carry relevant information, facts as evident, and the clinical opinion of the practitioner. The medical note is intended as peer to peer communication and may appear blunt or direct. It is written in medical language and may contain abbreviations or verbiage that are unfamiliar. If there are any questions or  concerns please contact the provider for clarification.              [1] No Known Allergies

## 2025-04-09 ENCOUNTER — OFFICE VISIT (OUTPATIENT)
Facility: LOCATION | Age: 70
End: 2025-04-09
Payer: MEDICARE

## 2025-04-09 VITALS
OXYGEN SATURATION: 98 % | HEART RATE: 84 BPM | TEMPERATURE: 97 F | DIASTOLIC BLOOD PRESSURE: 62 MMHG | SYSTOLIC BLOOD PRESSURE: 102 MMHG

## 2025-04-09 DIAGNOSIS — Z98.890 POST-OPERATIVE STATE: Primary | ICD-10-CM

## 2025-04-09 PROCEDURE — 99024 POSTOP FOLLOW-UP VISIT: CPT

## 2025-04-09 PROCEDURE — 1159F MED LIST DOCD IN RCRD: CPT

## 2025-04-09 PROCEDURE — 3074F SYST BP LT 130 MM HG: CPT

## 2025-04-09 PROCEDURE — 1111F DSCHRG MED/CURRENT MED MERGE: CPT

## 2025-04-09 PROCEDURE — 3078F DIAST BP <80 MM HG: CPT

## 2025-04-09 NOTE — PROGRESS NOTES
Post Operative Visit Note       Active Problems  1. Post-operative state         Chief Complaint   Chief Complaint   Patient presents with    Post-Op     PO - ROBOTIC LAPAROSCOPIC LOW ANTERIOR COLON RESECTION, FLEXIBLE  SIGMOIDOSCOPY W/ JJO-ANN 3/26. Feeling better, still pain. Discomfort/sharp pain. Incision sites ok.            History of Present Illness   The patient presents today for postoperative visit following robotic low anterior colon resection, flexible sigmoidoscopy on 3/26/25 by Dr. Galan.    The patient states that since her surgery that she is overall doing very well.  She states she is no longer requiring any medications for pain. She reports intermittent soreness in the low abdomen since her surgery, though states this has been improving. She denies complaints today.  She reports tolerating a low-fiber diet, denies nausea or vomiting.  She denies diarrhea or constipation.  She denies fever or chills.  She states that her incisions are healing well. There is no redness or drainage noted.      Allergies  Ilda has no known allergies.    Past Medical / Surgical / Social / Family History    The past medical and past surgical history have been reviewed by me today.     Past Medical History:    Cancer (HCC)    Colon cancer (HCC)    Coronary atherosclerosis    Diabetes (HCC)    High blood pressure    High cholesterol    Hyperlipidemia    Personal history of antineoplastic chemotherapy    LAST TREATMENT 25     Past Surgical History:   Procedure Laterality Date    Cabg      [facility] , Triple bypass    Colectomy      Colonoscopy N/A 10/29/2024    Dr. Anderson; polyps, sigmoid mass, rectal mass, diverticulosis    Colonoscopy N/A 10/29/2024    Procedure: COLONOSCOPY;  Surgeon: Toni Anderson MD;  Location: [facility] ENDOSCOPY    Egd N/A 10/29/2024    Dr. Anderson; hiatal hernia      82    4th child    Spine surgery procedure unlisted         The family history and social history  have been reviewed by me today.    Family History   Problem Relation Age of Onset    Breast Cancer Mother 70     Social History     Socioeconomic History    Marital status:    Tobacco Use    Smoking status: Never    Smokeless tobacco: Never   Vaping Use    Vaping status: Never Used   Substance and Sexual Activity    Alcohol use: Not Currently     Alcohol/week: 1.0 standard drink of alcohol     Comment: OCCASIONAL    Drug use: Never   Other Topics Concern    Caffeine Concern No    Exercise No    Seat Belt No    Special Diet No    Stress Concern No    Weight Concern No        Current Outpatient Medications:     Glucose Blood (ONETOUCH VERIO) In Vitro Strip, Use as directed to test blood sugar once daily., Disp: 100 strip, Rfl: 3    metFORMIN 500 MG Oral Tab, Take 1 tablet (500 mg total) by mouth 2 (two) times daily with meals., Disp: 90 tablet, Rfl: 1    metoprolol tartrate 50 MG Oral Tab, Take 1 tablet (50 mg total) by mouth 2x Daily(Beta Blocker)., Disp: 60 tablet, Rfl: 3    rosuvastatin 10 MG Oral Tab, Take 1 tablet (10 mg total) by mouth nightly., Disp: 90 tablet, Rfl: 1    aspirin 81 MG Oral Tab EC, Take 1 tablet (81 mg total) by mouth at bedtime., Disp: , Rfl:       Review of Systems  A 10 point Review of Systems has been completed by me today and is negative except as above in the HPI.    Physical Findings   /62 (BP Location: Left arm, Patient Position: Sitting, Cuff Size: adult)   Pulse 84   Temp 97.2 °F (36.2 °C) (Temporal)   SpO2 98%   Gen/psych: alert and oriented, cooperative, no apparent distress  Cardiovascular: regular rate  Respiratory: respirations unlabored, no wheeze  Abdominal: soft, non-tender, non-distended, no guarding/rebound  Incisions: Pfannenstiel and laparoscopic incision sites are well-approximated, clean, dry, intact. No surrounding erythema or exudates.          Assessment/Plan  1. Post-operative state        Continue low-fiber diet.  Continue to keep incision sites  clean, soap and water to the area during showers.  Lifting restrictions for 6 weeks postoperatively, no lifting greater than 20 pounds.  Pathology results discussed in office with patient today.  All of the patient's questions and concerns were addressed during today's visit.  Patient will follow up with Dr. Galan on 4/21/25. Additionally, patient is scheduled for Oncology follow-up with Dr. Carvalho on 4/14/25.       No orders of the defined types were placed in this encounter.       Imaging & Referrals   None    Follow Up  Return if symptoms worsen or fail to improve.    Melani Bernstein PA-C  [facility] General Surgery  4/9/2025  12:43 PM

## 2025-04-11 ENCOUNTER — TELEPHONE (OUTPATIENT)
Dept: FAMILY MEDICINE CLINIC | Facility: CLINIC | Age: 70
End: 2025-04-11

## 2025-04-11 DIAGNOSIS — E11.9 TYPE 2 DIABETES MELLITUS WITHOUT COMPLICATION, WITHOUT LONG-TERM CURRENT USE OF INSULIN (HCC): ICD-10-CM

## 2025-04-11 RX ORDER — BLOOD SUGAR DIAGNOSTIC
STRIP MISCELLANEOUS
Qty: 100 STRIP | Refills: 2 | Status: SHIPPED | OUTPATIENT
Start: 2025-04-11 | End: 2026-04-11

## 2025-04-11 NOTE — TELEPHONE ENCOUNTER
Glucose Blood (ONETOUCH VERIO) In Vitro Strip [   [place] telling patient they never received this script.

## 2025-04-11 NOTE — TELEPHONE ENCOUNTER
Patient states that the pharmacy did not receive the Rx for verio test strips. Per chart, Rx was sent 4/7, 100 strips 3 refills. Status shows 'Payer Waiting for Response'.   Updated Rx and sent 100 strips with 2 refills. Advised patient to follow up if still having issues. She verbalized understanding.

## 2025-04-14 ENCOUNTER — NURSE NAVIGATOR ENCOUNTER (OUTPATIENT)
Age: 70
End: 2025-04-14

## 2025-04-14 ENCOUNTER — OFFICE VISIT (OUTPATIENT)
Age: 70
End: 2025-04-14
Attending: STUDENT IN AN ORGANIZED HEALTH CARE EDUCATION/TRAINING PROGRAM
Payer: MEDICARE

## 2025-04-14 DIAGNOSIS — C20 RECTAL ADENOCARCINOMA (HCC): ICD-10-CM

## 2025-04-14 DIAGNOSIS — C18.7 ADENOCARCINOMA OF SIGMOID COLON (HCC): Primary | ICD-10-CM

## 2025-04-14 DIAGNOSIS — C18.7 ADENOCARCINOMA OF SIGMOID COLON (HCC): ICD-10-CM

## 2025-04-14 LAB
ALBUMIN SERPL-MCNC: 4.9 G/DL (ref 3.2–4.8)
ALBUMIN/GLOB SERPL: 2 {RATIO} (ref 1–2)
ALP LIVER SERPL-CCNC: 100 U/L (ref 55–142)
ALT SERPL-CCNC: 34 U/L (ref 10–49)
ANION GAP SERPL CALC-SCNC: 11 MMOL/L (ref 0–18)
AST SERPL-CCNC: 32 U/L (ref ?–34)
BASOPHILS # BLD AUTO: 0.07 X10(3) UL (ref 0–0.2)
BASOPHILS NFR BLD AUTO: 1.1 %
BILIRUB SERPL-MCNC: 0.5 MG/DL (ref 0.2–1.1)
BUN BLD-MCNC: 16 MG/DL (ref 9–23)
CALCIUM BLD-MCNC: 10.2 MG/DL (ref 8.7–10.6)
CEA SERPL-MCNC: 1.3 NG/ML (ref ?–5)
CHLORIDE SERPL-SCNC: 104 MMOL/L (ref 98–112)
CO2 SERPL-SCNC: 24 MMOL/L (ref 21–32)
CREAT BLD-MCNC: 0.79 MG/DL (ref 0.55–1.02)
DEPRECATED HBV CORE AB SER IA-ACNC: 245 NG/ML (ref 50–306)
EGFRCR SERPLBLD CKD-EPI 2021: 80 ML/MIN/1.73M2 (ref 60–?)
EOSINOPHIL # BLD AUTO: 0.15 X10(3) UL (ref 0–0.7)
EOSINOPHIL NFR BLD AUTO: 2.4 %
ERYTHROCYTE [DISTWIDTH] IN BLOOD BY AUTOMATED COUNT: 13.2 %
GLOBULIN PLAS-MCNC: 2.4 G/DL (ref 2–3.5)
GLUCOSE BLD-MCNC: 98 MG/DL (ref 70–99)
HCT VFR BLD AUTO: 33.5 % (ref 35–48)
HGB BLD-MCNC: 11 G/DL (ref 12–16)
IMM GRANULOCYTES # BLD AUTO: 0.01 X10(3) UL (ref 0–1)
IMM GRANULOCYTES NFR BLD: 0.2 %
IRON SATN MFR SERPL: 21 % (ref 15–50)
IRON SERPL-MCNC: 66 UG/DL (ref 50–170)
LYMPHOCYTES # BLD AUTO: 1.77 X10(3) UL (ref 1–4)
LYMPHOCYTES NFR BLD AUTO: 28.2 %
MCH RBC QN AUTO: 29.7 PG (ref 26–34)
MCHC RBC AUTO-ENTMCNC: 32.8 G/DL (ref 31–37)
MCV RBC AUTO: 90.5 FL (ref 80–100)
MONOCYTES # BLD AUTO: 0.59 X10(3) UL (ref 0.1–1)
MONOCYTES NFR BLD AUTO: 9.4 %
NEUTROPHILS # BLD AUTO: 3.69 X10 (3) UL (ref 1.5–7.7)
NEUTROPHILS # BLD AUTO: 3.69 X10(3) UL (ref 1.5–7.7)
NEUTROPHILS NFR BLD AUTO: 58.7 %
OSMOLALITY SERPL CALC.SUM OF ELEC: 289 MOSM/KG (ref 275–295)
PLATELET # BLD AUTO: 536 10(3)UL (ref 150–450)
POTASSIUM SERPL-SCNC: 4.3 MMOL/L (ref 3.5–5.1)
PROT SERPL-MCNC: 7.3 G/DL (ref 5.7–8.2)
RBC # BLD AUTO: 3.7 X10(6)UL (ref 3.8–5.3)
SODIUM SERPL-SCNC: 139 MMOL/L (ref 136–145)
TOTAL IRON BINDING CAPACITY: 319 UG/DL (ref 250–425)
TRANSFERRIN SERPL-MCNC: 254 MG/DL (ref 250–380)
WBC # BLD AUTO: 6.3 X10(3) UL (ref 4–11)

## 2025-04-14 NOTE — PROGRESS NOTES
Education Record    Learner:  Patient and Spouse    Disease / Diagnosis: Sigmoid/Rectal CA    Barriers / Limitations:  None   Comments:    Method:  Discussion   Comments:    General Topics:  Pain, Side effects and symptom management, and Plan of care reviewed   Comments:    Outcome:  Shows understanding   Comments:    Here for follow up. Had surgery 3/26. She is recovering well without any pain. She is on a low fiber diet and has a good appetite, but does not feel like she is eating enough. Her insurance company will not send her glucose test strips so she is afraid to go exercise. Bowels are regular. She feels like her neuropathy in her feet is worse after surgery.

## 2025-04-14 NOTE — PROGRESS NOTES
Education Record    Learner:  Patient    Disease / Diagnosis:    Barriers / Limitations:  None   Comments:    Method:  Discussion   Comments:    General Topics:  Plan of care reviewed   Comments:    Outcome:  Shows understanding   Comments:    Patient here for signatera/labs and MD follow up.

## 2025-04-14 NOTE — PROGRESS NOTES
Hematology/Oncology Clinic Follow Up Visit    Patient Name: Ilda Gutierrez  Medical Record Number: [id number]    YOB: 1955   PCP: Kasey Mendenhall MD    Reason for Consultation:  Ilda Gutierrez was seen today for the diagnosis of synchronous sigmoid and rectal adenocarcinoma    Oncologic History:  9/30/24: visit noted to have blood in stool, iron deficiency anemia  10/29/24: s/p colonoscopy with sigmoid colon mass (at 21cm) and rectal mass (at 7-8cm) with path for both positive for moderately differentiated adenocarcinoma. RENÉ.  10/31/24: Rectal MRI reviewed at rectal tumor board; circumferential mass of upper to mid rectum measuring 6cm. T3bN0. Sigmoid mass adjacent to rectal mass. Separate surgeries cannot be performed.  11/19/24: C1D1 FOLFOX  12/3/24: C2D1 FOLFOX  12/17/24: C3D1 FOLFOX  12/31/24: C4D1 FOLFOX  1/14/24: C5D1 FOLFOX  1/28/24: C6D1 FOLFOX  3/26/25: s/p LAR, sigmoid colon resection with path positive for invasive moderately differentiated adenoca 1.4cm extending through muscularis propria into adipose tissue. High tumor budding. Rectum with path positive for invasive moderately differentiated adenocarcinoma extending through muscularis propria into adipose tissue, lying at anterior peritoneal reflection. Margins all negative. 0/31 Lns negative. Regression score both 2. YpT3N0 for both cancers.    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    - here with   - recovering well from surgery, no bowel issues, no abdominal discomfort  - just celebrated her 70th birthday with entire family  - some slight neuropathy intermittent in feet, none in hands    Past Medical History:  Past Medical History:    Cancer (HCC)    Colon cancer (HCC)    Coronary atherosclerosis    Diabetes (HCC)    High blood pressure    High cholesterol    Hyperlipidemia    Personal history of  antineoplastic chemotherapy    LAST TREATMENT 25     Past Surgical History:   Procedure Laterality Date    Cabg      [facility] , Triple bypass    Colectomy      Colonoscopy N/A 10/29/2024    Dr. Anderson; polyps, sigmoid mass, rectal mass, diverticulosis    Colonoscopy N/A 10/29/2024    Procedure: COLONOSCOPY;  Surgeon: Toni Anderson MD;  Location: [facility] ENDOSCOPY    Egd N/A 10/29/2024    Dr. Anderson; hiatal hernia      82    4th child    Spine surgery procedure unlisted         Home Medications:  No outpatient medications have been marked as taking for the 25 encounter (Appointment) with Homero Carvalho MD.       Allergies:   Allergies[1]    Psychosocial History:  Social History     Socioeconomic History    Marital status:      Spouse name: Not on file    Number of children: Not on file    Years of education: Not on file    Highest education level: Not on file   Occupational History    Not on file   Tobacco Use    Smoking status: Never    Smokeless tobacco: Never   Vaping Use    Vaping status: Never Used   Substance and Sexual Activity    Alcohol use: Not Currently     Alcohol/week: 1.0 standard drink of alcohol     Comment: OCCASIONAL    Drug use: Never    Sexual activity: Not on file   Other Topics Concern    Caffeine Concern No    Exercise No    Seat Belt No    Special Diet No    Stress Concern No    Weight Concern No   Social History Narrative    Not on file     Social Drivers of Health     Food Insecurity: No Food Insecurity (3/26/2025)    NCSS - Food Insecurity     Worried About Running Out of Food in the Last Year: No     Ran Out of Food in the Last Year: No   Transportation Needs: No Transportation Needs (3/26/2025)    NCSS - Transportation     Lack of Transportation: No   Housing Stability: Not At Risk (3/26/2025)    NCSS - Housing/Utilities     Has Housing: Yes     Worried About Losing Housing: No     Unable to Get Utilities: No       Family Medical  History:  Family History   Problem Relation Age of Onset    Breast Cancer Mother 70       Review of Systems:  A 10-point ROS was done with pertinent positives and negative per the HPI    Vital Signs:  Height: --  Weight: --  BSA (Calculated - sq m): --  Pulse: --  BP: --  Temp: --  Do Not Use - Resp Rate: --  SpO2: --    Wt Readings from Last 6 Encounters:   04/07/25 70.8 kg (156 lb)   03/26/25 74.8 kg (165 lb)   02/25/25 73.9 kg (163 lb)   02/17/25 73.9 kg (163 lb)   01/28/25 74.1 kg (163 lb 6.4 oz)   01/14/25 73.2 kg (161 lb 6.4 oz)       ECOG PS: 1    Physical Examination:  General: Patient is alert and oriented, not in acute distress  Psych: Mood and affect are appropriate  Eyes: EOMI, PERRL  ENT: Oropharynx is clear, no adenopathy  CV: no LE edema  Respiratory: non-labored respirations  GI/Abd: Soft, non-tender   Neurological: Grossly intact   Lymphatics: No palpable inguinal lymphadenopathy  Skin: no rashes or petechiae    Laboratory:  Lab Results   Component Value Date    WBC 6.6 03/28/2025    WBC 8.3 03/27/2025    WBC 5.3 03/21/2025    HGB 11.1 (L) 03/28/2025    HGB 10.9 (L) 03/27/2025    HGB 13.3 03/21/2025    HCT 32.7 (L) 03/28/2025    MCV 90.3 03/28/2025    MCH 30.7 03/28/2025    MCHC 33.9 03/28/2025    RDW 13.6 03/28/2025    .0 03/28/2025    .0 03/27/2025    .0 03/21/2025     Lab Results   Component Value Date     (H) 03/28/2025    BUN 12 03/28/2025    CREATSERUM 0.64 03/28/2025    CREATSERUM 0.80 03/27/2025    CREATSERUM 0.90 01/28/2025    ANIONGAP 11 03/28/2025    GFR 94 08/23/2017    CA 9.4 03/28/2025    OSMOCALC 301 (H) 03/28/2025    ALKPHO 119 01/28/2025    AST 35 (H) 01/28/2025    ALT 35 01/28/2025    BILT 0.5 01/28/2025    TP 6.7 01/28/2025    ALB 4.5 01/28/2025    GLOBULIN 2.2 01/28/2025     03/28/2025    K 3.4 (L) 03/28/2025     03/28/2025    CO2 24.0 03/28/2025     Lab Results   Component Value Date    PTT 23.7 06/11/2024    INR 0.09 (A) 11/15/2024      Impression & Plan:     Synchronous rectal and sigmoid colon adenocarcinoma  - T3bN0 on rectal MRI for rectal cancer, with adjacent sigmoid colon mass. Both adenocarcinoma, RENÉ  - completed 3 months neoadjuvant FOLFOX per MARTHA (for colon) and PROSPECT (for rectal) as she was reviewed at rectal tumor board; due to close proximity, separate surgeries cannot be performed.   - resected 3/26/25 with good treatment effect (regression score of 2), 31 lymph nodes negative, ypT3N0 for both sigmoid and rectal ca  - although post-op path was LN negative, can't rule out lymphadenopathy at diagnosis for colon ca so will plan surveillance as stage 3  -CT C/A/P q6 monthly for 2 years followed by yearly til year 5  - colonoscopy in 3/2026  - ctDNA today    Iron deficiency anemia  - s/p IV iron dextran 1000mg  - iron levels without iron deficiency today. Anemia and thrombocytosis due to inflammation from recent surgery    CAD  - s/p 3v CABG 6/2024  - on ASA 81mg  - Dr Saleem is her cardiologist    F/u: 6 mos, after CT    Homero Carvalho MD  [facility] Hematology Oncology           [1] No Known Allergies

## 2025-04-15 ENCOUNTER — TELEPHONE (OUTPATIENT)
Dept: FAMILY MEDICINE CLINIC | Facility: CLINIC | Age: 70
End: 2025-04-15

## 2025-04-15 DIAGNOSIS — E11.9 DIABETES MELLITUS TYPE 2 IN NONOBESE (HCC): Primary | ICD-10-CM

## 2025-04-15 RX ORDER — LANCETS
EACH MISCELLANEOUS
Qty: 100 EACH | Refills: 3 | Status: SHIPPED | OUTPATIENT
Start: 2025-04-15

## 2025-04-15 RX ORDER — BLOOD GLUCOSE CONTROL HIGH,LOW
1 EACH MISCELLANEOUS AS NEEDED
Qty: 1 EACH | Refills: 1 | Status: SHIPPED | OUTPATIENT
Start: 2025-04-15

## 2025-04-15 RX ORDER — BLOOD SUGAR DIAGNOSTIC
STRIP MISCELLANEOUS
Qty: 100 STRIP | Refills: 3 | Status: SHIPPED | OUTPATIENT
Start: 2025-04-15

## 2025-04-15 RX ORDER — BLOOD-GLUCOSE METER
EACH MISCELLANEOUS
Qty: 1 KIT | Refills: 0 | Status: SHIPPED | OUTPATIENT
Start: 2025-04-15

## 2025-04-15 NOTE — TELEPHONE ENCOUNTER
Patient has having issues refilling Glucose Blood (ONETOUCH VERIO) In Vitro Strip.  Pharmacy states patient needs prior authorization.  She said insurance would prefer patient use a different monitor but she doesn't want to pay for another monitor.

## 2025-04-21 ENCOUNTER — OFFICE VISIT (OUTPATIENT)
Facility: LOCATION | Age: 70
End: 2025-04-21
Payer: MEDICARE

## 2025-04-21 VITALS
SYSTOLIC BLOOD PRESSURE: 115 MMHG | DIASTOLIC BLOOD PRESSURE: 70 MMHG | TEMPERATURE: 98 F | OXYGEN SATURATION: 99 % | HEART RATE: 81 BPM

## 2025-04-21 DIAGNOSIS — Z98.890 POST-OPERATIVE STATE: Primary | ICD-10-CM

## 2025-04-21 DIAGNOSIS — C20 RECTAL CANCER (HCC): ICD-10-CM

## 2025-04-21 DIAGNOSIS — C18.7 CANCER OF SIGMOID COLON (HCC): ICD-10-CM

## 2025-04-21 NOTE — PROGRESS NOTES
Follow Up Visit Note      Active Problems      1. Post-operative state    2. Rectal cancer (HCC)    3. Cancer of sigmoid colon (HCC)          Chief Complaint   Chief Complaint   Patient presents with    Post-Op     PO - ROBOTIC LAPAROSCOPIC LOW ANTERIOR COLON RESECTION, FLEXIBLE  SIGMOIDOSCOPY W/ JJO-ANN 3/26. Pt is doing well. Pt reports still having some occasional sharp pains in the lower abdomen, but states not as often as at her LOV. Pt having normal, soft stools. Pt has questions about what to do if she were to get constipated. Pt denies any other symptoms.          History of Present Illness  This is a very nice 70 year old female who presents to clinic for continued care and evaluation following robotic low anterior colon resection for rectal and sigmoid colon cancer on 3/26/2025.    She reports doing well since her last visit. She reports intermittent abdominal cramping specifically near her pfannenstiel incision site and prior to bowel movements. She is no longer requiring over the counter analgesics. She reports regular, daily bowel movements. The patient is following a low fiber/ soft diet and is slowly starting to incorporate more regular foods.    She denies nausea, vomiting, fevers or chills. She denies concern for incision site infection.    Surgical pathology reviewed with the patient. The patient followed with Dr. Carvalho on 4/14/2025. He recommended CT of the chest, abdomen, and pelvis every 6 months for 2 years then yearly until year 5.  She will be due for a colonoscopy in March 2026.  Dr. Carvalho also ordered a ctDNA test.        Allergies  Ilda has no known allergies.    Past Medical / Surgical / Social / Family History    The past medical and past surgical history have been reviewed by me today.    Past Medical History[1]  Past Surgical History[2]    The family history and social history have been reviewed by me today.    Family History[3]  Social Hx on file[4]   Medications - Current[5]     Review  of Systems  A 10 point review of systems was performed and negative unless otherwise documented per HPI.     Physical Findings   /70 (BP Location: Right arm, Patient Position: Sitting, Cuff Size: adult)   Pulse 81   Temp 98.2 °F (36.8 °C) (Temporal)   SpO2 99%   Physical Exam  Constitutional:       Appearance: Normal appearance.   HENT:      Head: Normocephalic and atraumatic.   Eyes:      Extraocular Movements: Extraocular movements intact.      Pupils: Pupils are equal, round, and reactive to light.   Pulmonary:      Effort: Pulmonary effort is normal. No respiratory distress.   Abdominal:      General: Abdomen is flat. Bowel sounds are normal. There is no distension.      Palpations: Abdomen is soft. There is no mass.      Tenderness: There is no abdominal tenderness. There is no guarding or rebound.      Comments: Abdomen is soft, non-distended, non-tender to palpation. No rebound or guarding. No peritoneal signs. There are areas of healing ecchymosis on the patient's lower abdomen.    Robotic incision sites are well healed. Pfannenstiel incision well healed with raised areas of scarring. No surrounding erythema or drainage. No signs of infection.   Musculoskeletal:         General: Normal range of motion.      Cervical back: Normal range of motion.   Skin:     General: Skin is warm.      Coloration: Skin is not jaundiced or pale.      Findings: No erythema.   Neurological:      General: No focal deficit present.      Mental Status: She is alert and oriented to person, place, and time.         Assessment and Plan      Overall, the patient continues to do well postoperatively. She may begin to increase her dietary fiber and transition to a more regular diet. Continue over the counter analgesics as needed. Continue to maintain a 20 lb lifting restriction until 6 weeks after surgery.     She will need to undergo a surveillance colonoscopy in 1 year. The patient would like me to perform her surveillance  colonoscopies. She was scheduled for telephone follow-up in 6 months to discuss colonoscopy in detail and schedule the procedure at that time.    Patient is welcome to contact me in the meantime with any surgical questions or concerns.     No orders of the defined types were placed in this encounter.      Imaging & Referrals   None    Follow Up  No follow-ups on file.    Hector Galan MD            [1]   Past Medical History:   Cancer (HCC)    Colon cancer (HCC)    Coronary atherosclerosis    Diabetes (HCC)    High blood pressure    High cholesterol    Hyperlipidemia    Personal history of antineoplastic chemotherapy    LAST TREATMENT 25   [2]   Past Surgical History:  Procedure Laterality Date    Cabg      [facility] , Triple bypass    Colectomy      Colonoscopy N/A 10/29/2024    Dr. Anderson; polyps, sigmoid mass, rectal mass, diverticulosis    Colonoscopy N/A 10/29/2024    Procedure: COLONOSCOPY;  Surgeon: Toni Anderson MD;  Location: [facility] ENDOSCOPY    Egd N/A 10/29/2024    Dr. Anderson; hiatal hernia      82    4th child    Spine surgery procedure unlisted     [3]   Family History  Problem Relation Age of Onset    Breast Cancer Mother 70    Diabetes Mother    [4]   Social History  Socioeconomic History    Marital status:    Tobacco Use    Smoking status: Never    Smokeless tobacco: Never   Vaping Use    Vaping status: Never Used   Substance and Sexual Activity    Alcohol use: Not Currently     Alcohol/week: 1.0 standard drink of alcohol     Comment: OCCASIONAL    Drug use: Never   Other Topics Concern    Caffeine Concern No    Exercise No    Seat Belt No    Special Diet No    Stress Concern No    Weight Concern No   [5]   Current Outpatient Medications:     Blood Glucose Monitoring Suppl (ACCU-CHEK GUIDE) w/Device Does not apply Kit, Test once daily DX E11.9 not on insulin, Disp: 1 kit, Rfl: 0    Accu-Chek FastClix Lancets Does not apply Misc, Test once daily. Dx:  E11.9 not on insulin, Disp: 100 each, Rfl: 3    Glucose Blood (ACCU-CHEK GUIDE TEST) In Vitro Strip, Test once daily DX:E11.9 not on insulin, Disp: 100 strip, Rfl: 3    Blood Glucose Calibration (ACCU-CHEK GUIDE CONTROL) In Vitro Liquid, 1 Bottle by In Vitro route as needed., Disp: 1 each, Rfl: 1    Glucose Blood (ONETOUCH VERIO) In Vitro Strip, Use as directed to test blood sugar once daily., Disp: 100 strip, Rfl: 2    metFORMIN 500 MG Oral Tab, Take 1 tablet (500 mg total) by mouth 2 (two) times daily with meals., Disp: 90 tablet, Rfl: 1    metoprolol tartrate 50 MG Oral Tab, Take 1 tablet (50 mg total) by mouth 2x Daily(Beta Blocker)., Disp: 60 tablet, Rfl: 3    rosuvastatin 10 MG Oral Tab, Take 1 tablet (10 mg total) by mouth nightly., Disp: 90 tablet, Rfl: 1    aspirin 81 MG Oral Tab EC, Take 1 tablet (81 mg total) by mouth at bedtime., Disp: , Rfl:

## 2025-04-21 NOTE — PROGRESS NOTES
The following individual(s) verbally consented to be recorded using ambient AI listening technology and understand that they can each withdraw their consent to this listening technology at any point by asking the clinician to turn off or pause the recording:    Patient name: Ilda Mercadojesus

## 2025-05-09 ENCOUNTER — TELEPHONE (OUTPATIENT)
Dept: FAMILY MEDICINE CLINIC | Facility: CLINIC | Age: 70
End: 2025-05-09

## 2025-05-15 ENCOUNTER — TELEPHONE (OUTPATIENT)
Age: 70
End: 2025-05-15

## 2025-06-02 ENCOUNTER — TELEPHONE (OUTPATIENT)
Age: 70
End: 2025-06-02

## 2025-06-02 NOTE — TELEPHONE ENCOUNTER
Returned patient's call regarding getting her port removed. Patient is deciding  to keep port in longer. All questions answered and patient conveyed understanding.

## 2025-06-02 NOTE — TELEPHONE ENCOUNTER
Patient is calling to see if she can have port removed. She has been cleared cancer free since January.       Please contact patient.

## 2025-07-08 DIAGNOSIS — E11.9 TYPE 2 DIABETES MELLITUS WITHOUT COMPLICATION, WITHOUT LONG-TERM CURRENT USE OF INSULIN (HCC): ICD-10-CM

## 2025-07-09 NOTE — TELEPHONE ENCOUNTER
Requested Prescriptions     Pending Prescriptions Disp Refills    METFORMIN 500 MG Oral Tab [Pharmacy Med Name: metFORMIN HCl Oral Tablet 500 MG] 180 tablet 3     Sig: TAKE 1 TABLET TWICE DAILY WITH MEALS     Last refill 4/7/25 #90  x 1  Refill remains on script - refill denied.

## 2025-07-25 ENCOUNTER — LAB ENCOUNTER (OUTPATIENT)
Dept: LAB | Age: 70
End: 2025-07-25
Attending: STUDENT IN AN ORGANIZED HEALTH CARE EDUCATION/TRAINING PROGRAM
Payer: MEDICARE

## 2025-07-25 DIAGNOSIS — E11.9 TYPE 2 DIABETES MELLITUS WITHOUT COMPLICATION, WITHOUT LONG-TERM CURRENT USE OF INSULIN (HCC): ICD-10-CM

## 2025-07-25 LAB
ALBUMIN SERPL-MCNC: 4.6 G/DL (ref 3.2–4.8)
ALBUMIN/GLOB SERPL: 1.8 (ref 1–2)
ALP LIVER SERPL-CCNC: 68 U/L (ref 55–142)
ALT SERPL-CCNC: 33 U/L (ref 10–49)
ANION GAP SERPL CALC-SCNC: 5 MMOL/L (ref 0–18)
AST SERPL-CCNC: 34 U/L (ref ?–34)
BILIRUB SERPL-MCNC: 0.9 MG/DL (ref 0.2–1.1)
BUN BLD-MCNC: 15 MG/DL (ref 9–23)
CALCIUM BLD-MCNC: 10.3 MG/DL (ref 8.7–10.6)
CHLORIDE SERPL-SCNC: 114 MMOL/L (ref 98–112)
CHOLEST SERPL-MCNC: 133 MG/DL (ref ?–200)
CO2 SERPL-SCNC: 21 MMOL/L (ref 21–32)
CREAT BLD-MCNC: 0.72 MG/DL (ref 0.55–1.02)
CREAT UR-SCNC: 243.9 MG/DL
EGFRCR SERPLBLD CKD-EPI 2021: 90 ML/MIN/1.73M2 (ref 60–?)
EST. AVERAGE GLUCOSE BLD GHB EST-MCNC: 111 MG/DL (ref 68–126)
FASTING PATIENT LIPID ANSWER: YES
FASTING STATUS PATIENT QL REPORTED: YES
GLOBULIN PLAS-MCNC: 2.5 G/DL (ref 2–3.5)
GLUCOSE BLD-MCNC: 118 MG/DL (ref 70–99)
HBA1C MFR BLD: 5.5 % (ref ?–5.7)
HDLC SERPL-MCNC: 46 MG/DL (ref 40–59)
LDLC SERPL CALC-MCNC: 58 MG/DL (ref ?–100)
MICROALBUMIN UR-MCNC: 4.1 MG/DL
MICROALBUMIN/CREAT 24H UR-RTO: 16.8 UG/MG (ref ?–30)
NONHDLC SERPL-MCNC: 87 MG/DL (ref ?–130)
OSMOLALITY SERPL CALC.SUM OF ELEC: 292 MOSM/KG (ref 275–295)
POTASSIUM SERPL-SCNC: 3.7 MMOL/L (ref 3.5–5.1)
PROT SERPL-MCNC: 7.1 G/DL (ref 5.7–8.2)
SODIUM SERPL-SCNC: 140 MMOL/L (ref 136–145)
TRIGL SERPL-MCNC: 172 MG/DL (ref 30–149)
VLDLC SERPL CALC-MCNC: 25 MG/DL (ref 0–30)

## 2025-07-25 PROCEDURE — 36415 COLL VENOUS BLD VENIPUNCTURE: CPT

## 2025-07-25 PROCEDURE — 82570 ASSAY OF URINE CREATININE: CPT

## 2025-07-25 PROCEDURE — 80061 LIPID PANEL: CPT

## 2025-07-25 PROCEDURE — 80053 COMPREHEN METABOLIC PANEL: CPT

## 2025-07-25 PROCEDURE — 82043 UR ALBUMIN QUANTITATIVE: CPT

## 2025-07-25 PROCEDURE — 83036 HEMOGLOBIN GLYCOSYLATED A1C: CPT

## 2025-07-28 DIAGNOSIS — E11.9 TYPE 2 DIABETES MELLITUS WITHOUT COMPLICATION, WITHOUT LONG-TERM CURRENT USE OF INSULIN (HCC): ICD-10-CM

## 2025-08-01 ENCOUNTER — TELEPHONE (OUTPATIENT)
Facility: LOCATION | Age: 70
End: 2025-08-01

## (undated) DEVICE — SYRINGE MED 10ML LL TIP W/O SFTY DISP

## (undated) DEVICE — SUT ETHLN 2-0 18IN FS NABSRB BLK 26MM 3/8 CIR

## (undated) DEVICE — FENESTRATED BIPOLAR FORCEPS: Brand: ENDOWRIST

## (undated) DEVICE — KIT CUSTOM ENDOPROCEDURE STERIS

## (undated) DEVICE — KIT CLEAN ENDOKIT 1.1OZ GOWNX2

## (undated) DEVICE — V2 SPECIMEN COLLECTION TRAY: Brand: NEPTUNE

## (undated) DEVICE — CELL SAVER RESERVOIR

## (undated) DEVICE — KIT VLV 5 PC AIR H2O SUCT BX ENDOGATOR CONN

## (undated) DEVICE — GRABBER GRASPER TIP, DISPOSABLE: Brand: RENEW

## (undated) DEVICE — CLOSING BUNDLE: Brand: MEDLINE INDUSTRIES, INC.

## (undated) DEVICE — SLEEVE COMPR MD KNEE LEN SGL USE KENDALL SCD

## (undated) DEVICE — COVER,MAYO STAND,STERILE: Brand: MEDLINE

## (undated) DEVICE — SUT POLYDEK 2-0 75CM NABSRB GRN

## (undated) DEVICE — SKN PREP SPNG STKS PVP PNT STR: Brand: MEDLINE INDUSTRIES, INC.

## (undated) DEVICE — SUT VCRL 0 L18IN ABSRB UD TIE POLYGLACTIN

## (undated) DEVICE — TIP-UP FENESTRATED GRASPER: Brand: ENDOWRIST

## (undated) DEVICE — ADHESIVE SKIN TOP FOR WND CLSR DERMBND ADV

## (undated) DEVICE — COLUMN DRAPE

## (undated) DEVICE — SUT PDS II 0 36IN CT-1 ABSRB VLT L36MM 1/2

## (undated) DEVICE — SUREFORM 45 RELOAD BLUE: Brand: SUREFORM

## (undated) DEVICE — Device: Brand: INTELLICART™

## (undated) DEVICE — BLADELESS OBTURATOR: Brand: WECK VISTA

## (undated) DEVICE — V2 SPECIMEN COLLECTION MANIFOLD KIT: Brand: NEPTUNE

## (undated) DEVICE — WOUND RETRACTOR AND PROTECTOR: Brand: ALEXIS O WOUND PROTECTOR-RETRACTOR

## (undated) DEVICE — STERILE POLYISOPRENE POWDER-FREE SURGICAL GLOVES: Brand: PROTEXIS

## (undated) DEVICE — ENDOPATH ULTRA VERESS INSUFFLATION NEEDLES WITH LUER LOCK CONNECTORS: Brand: ENDOPATH

## (undated) DEVICE — [HIGH FLOW INSUFFLATOR,  DO NOT USE IF PACKAGE IS DAMAGED,  KEEP DRY,  KEEP AWAY FROM SUNLIGHT,  PROTECT FROM HEAT AND RADIOACTIVE SOURCES.]: Brand: PNEUMOSURE

## (undated) DEVICE — TRAY CATH 16FR F INCL BARDX IC COMPLT CARE

## (undated) DEVICE — VIOLET BRAIDED (POLYGLACTIN 910), SYNTHETIC ABSORBABLE SUTURE: Brand: COATED VICRYL

## (undated) DEVICE — TRANSFER PACK CONTAINER 600 ML WITH COUPLER. STERILE FLUID PATH: Brand: FENWAL TRANSFER PACK CONTAINER 600 ML WITH COUPLER

## (undated) DEVICE — SUREFORM 45: Brand: SUREFORM

## (undated) DEVICE — Device: Brand: VIRTUOSAPH PLUS WITH RADIAL INDICATION

## (undated) DEVICE — LACTATED R INJ

## (undated) DEVICE — CONMED SCOPE SAVER BITE BLOCK, 20X27 MM: Brand: SCOPE SAVER

## (undated) DEVICE — SOLUTION IRRIG 3000ML 0.9% NACL FLX CONT

## (undated) DEVICE — SUT 6 DBL WIRE 14IN CCS-1 NABSRB L49MM 1/2 CI

## (undated) DEVICE — SYRINGE, LUER SLIP, STERILE, 60ML: Brand: MEDLINE

## (undated) DEVICE — AIRSEAL TRI-LUMEN LILTERED TUBE SET: Brand: AIRSEAL

## (undated) DEVICE — SYRINGE MED 30ML STD CLR PLAS LL TIP N CTRL

## (undated) DEVICE — 40580 - THE PINK PAD - ADVANCED TRENDELENBURG POSITIONING KIT: Brand: 40580 - THE PINK PAD - ADVANCED TRENDELENBURG POSITIONING KIT

## (undated) DEVICE — SOLUTION IRRIG 1000ML 0.9% NACL USP BTL

## (undated) DEVICE — GLOVE SUR 7 SENSICARE PI PIP CRM PWD F

## (undated) DEVICE — MEDI-VAC NON-CONDUCTIVE SUCTION TUBING: Brand: CARDINAL HEALTH

## (undated) DEVICE — VESSEL SEALER EXTEND: Brand: ENDOWRIST

## (undated) DEVICE — HUNTER GASPER TIP, DISPOSABLE: Brand: RENEW

## (undated) DEVICE — AIRSEAL 5 MM ACCESS PORT AND LOW PROFILE OBTURATOR WITH BLADELESS OPTICAL TIP, 120 MM LENGTH: Brand: AIRSEAL

## (undated) DEVICE — COVER,LIGHT,CAMERA,HARD,1/PK,STRL: Brand: MEDLINE

## (undated) DEVICE — OPEN HEART: Brand: MEDLINE INDUSTRIES, INC.

## (undated) DEVICE — DRAPE,TOP,102X53,STERILE: Brand: MEDLINE

## (undated) DEVICE — CO2 CANNULA,SSOFT,ADLT,7O2,4CO2,FEMALE: Brand: MEDLINE

## (undated) DEVICE — KIT ENDO ORCAPOD 160/180/190

## (undated) DEVICE — PERMANENT CAUTERY HOOK: Brand: ENDOWRIST

## (undated) DEVICE — LAPCLINCH GRASPER TIP, DISPOSABLE: Brand: RENEW

## (undated) DEVICE — SUPER ATRAUMATIC MODIFIED GRASPER TIP, DISPOSABLE: Brand: RENEW

## (undated) DEVICE — CIRCULAR MECH XL SEAL 29MM

## (undated) DEVICE — GIJAW SINGLE-USE BIOPSY FORCEPS WITH NEEDLE: Brand: GIJAW

## (undated) DEVICE — CLIP INT L ORNG TI TRNSVRS GRV CHEVRON SHP

## (undated) DEVICE — ARM DRAPE

## (undated) DEVICE — ABSORBABLE HEMOSTAT (OXIDIZED REGENERATED CELLULOSE): Brand: SURGICEL

## (undated) DEVICE — EVACUATOR SUR 100CC SIL BLB WND

## (undated) DEVICE — DRAIN SUR 19FR L0.25IN 3/4 FLUT 3/16IN TRCR

## (undated) DEVICE — CABLE SUR L12IN SM CLP LNG DISP

## (undated) DEVICE — SEAL

## (undated) DEVICE — 10FT COMBINED O2 DELIVERY/CO2 MONITORING. FILTER WITH MICROSTREAM TYPE LUER: Brand: DUAL ADULT NASAL CANNULA

## (undated) DEVICE — SUREFORM 45 RELOAD WHITE: Brand: SUREFORM

## (undated) DEVICE — GOWN,SIRUS,FABRNF,XL,20/CS: Brand: MEDLINE

## (undated) DEVICE — 1200CC GUARDIAN II: Brand: GUARDIAN

## (undated) DEVICE — SUT MCRYL 4-0 18IN PS-2 ABSRB UD 19MM 3/8 CIR

## (undated) DEVICE — SUT PROL 2-0 30IN SH NABSRB BLU L26MM 1/2 CIR

## (undated) DEVICE — MARKER ENDOSCP TISS TATTOO C BLK SUSP PREFIL

## (undated) DEVICE — NEEDLE INJ 25GA CATH 230CM CHN 2.8MM ACUJECT

## (undated) DEVICE — GLOVE SUR 7 BIOGEL PI ORTHOPRO PIP BRN PWD F

## (undated) DEVICE — PACK CDS LAP COLON

## (undated) DEVICE — SUT PROL 6-0 18IN C-1 NABSRB BLU 13MM 3/8 CIR

## (undated) DEVICE — LAPAROVUE VISIBILITY SYSTEM LAPAROSCOPIC SOLUTIONS: Brand: LAPAROVUE

## (undated) DEVICE — MEDI-VAC NON-CONDUCTIVE SUCTION TUBING 6MM X 1.8M (6FT.) L: Brand: CARDINAL HEALTH

## (undated) DEVICE — GLOVE SUR 7.5 SENSICARE PI PIP GRN PWD F

## (undated) DEVICE — 3M™ RED DOT™ MONITORING ELECTRODE WITH FOAM TAPE AND STICKY GEL 2570-3, 3/BAG, 200/CASE, 54/PLT: Brand: RED DOT™

## (undated) DEVICE — CABLE PT L10FT ELECTRD PIN DIA1-2MM VENTRICLE

## (undated) DEVICE — SUT PROL 8-0 18IN BV130-5 NABSRB BLU 6.5MM 3/

## (undated) DEVICE — GOWN,SIRUS,FABRIC-REINFORCED,X-LARGE: Brand: MEDLINE

## (undated) DEVICE — LASSO POLYPECTOMY SNARE: Brand: LASSO

## (undated) DEVICE — SUT PDS II 0 L27IN ABSRB VLT L26MM CT-2

## (undated) DEVICE — YANKAUER,BULB TIP,W/O VENT,RIGID,STERILE: Brand: MEDLINE

## (undated) NOTE — LETTER
3/26/2025    Return to Work    Name: lIda Gutierrez        : 1955    To Whom It May Concern,    Ilda Gutierrez had surgery on 3/26/25 and is:    Please excuse the patient from work for 2 weeks. Further return to work recommendations will be provided at her postoperative appointment.    If there are any further questions, regarding this patient's care, please contact the patient directly.    Sincerely,    JACOB Benitez

## (undated) NOTE — LETTER
OUTSIDE TESTING RESULT REQUEST     IMPORTANT: FOR YOUR IMMEDIATE ATTENTION  Please FAX all test results listed below to: [phone]       * * * * If testing is NOT complete, arrange with patient A.S.A.P. * * * *    Patient Name: Ilda Gutierrez  Surgery Date: 3/26/2025  Medical Record: [id number]  CSN: [id number]  : 1955 - A: 69 y     Sex: female  Surgeon(s):  Hector Galan MD  Procedure: ROBOTIC LAPAROSCOPIC LOW ANTERIOR COLON RESECTION, POSSIBLE OSTOMY, POSSIBLE OPEN  Anesthesia Type: General     Surgeon: Hector Galan MD     The following Testing and Time Line are REQUIRED PER ANESTHESIA     EKG READ AND SIGNED WITHIN   90 days      Thank You,   Sent by: KIM Reilly

## (undated) NOTE — LETTER
01/15/25        Ilda Gutierrez  [address] [address]      Dear Ilda,    Our records indicate that you have outstanding lab work and or testing that was ordered for you and has not yet been completed:      CT CHEST+ABDOMEN+PELVIS(ALL CNTRST ONLY)(CQD=22401/82225) (Order #[id number]) on 10/29/24       To provide you with the best possible care, please complete these orders at your earliest convenience. If you have recently completed these orders please disregard this letter.     If you have any questions please call the office at No information on file..     Thank you,       Not in an encounter context.

## (undated) NOTE — Clinical Note
Initial assessment completed with patient. Pt declines TCM/GAURAV with you--prefers to f/u with gen sx, only, at this time.  Patient has met goals, no further outreach needed. Sent TE to office staff as FYI/GAURAV protocol.  Thank you!

## (undated) NOTE — LETTER
Hector Galan M.D.    Surgical Clearance Needed    Date: 2/17/2025                                                                       From: [facility]    Attn: DR GARCÍA                                                                                Fax:     RE: Surgical Clearance               # of Pages: 1        Patient Name: Ilda Gutierrez    Patient YOB: 1955    Consult For: CARDIAC CLEARANCE.  DR GALAN IS GOOD WITH PATIENT CONTINUING HER 81 MG ASPIRIN UNTIL PROCEDURE     Surgery: FLEXIBLE SIGMOIDOSCOPY    Date of Surgery: 2/25/25 AT [facility]         This facsimile transmission is provided for your internal use only. If the reader of this message is not the intended recipient, you are hereby notified that you have received this document in error, and that any review, dissemination, distribution, or copying of this message is strictly prohibited. If you have received this communication in error, please notify us immediately by telephone and return the original message to us by mail.

## (undated) NOTE — LETTER
To:  Dr.Stanley Saleem  Date:  2025  Patient Name: Ilda Gutierrez  -Age / Sex: 1955-A: 69 y  female  Medical Records: [id number]   CSN: [id number]      Clearance for Surgery Requested by Surgeon    Request for:  Cardiac Clearance    Requested by Surgeon: Dr.Jeremy Galan    Surgical Date: 2025    Procedure: FLEXIBLE SIGMOIDOSCOPY, N/A      Please fax the clearance note to the Pre-Admission Testing department.  Thank you.    KIM iWlks  PreADmission Testing

## (undated) NOTE — LETTER
Jamison Goyal M.D., F.A.C.S.  Kurtis Obrien M.D., F.A.C.S.  Wilbert Chen M.D., F.A.C.S  Abdulaziz Rios M.D., F.A.C.S.   Ulises Wallace M.D., F.A.C.S.   Rosita Guzman M.D.  Santiago Hall M.D., F.A.C.S.  Ari Hayward M.D., F.A.C.S.  Ari Prado M.D., F.A.C.STres Becker M.D., F.A.C.S.  Ari Deng M.D. F.A.C.S.  Jp Maynard M.D., F.A.C.S.   Vlad Santana M.D., F.A.C.S.  Michael Longoria M.D., F.A.C.S., F.A.C.C. Khalif Adams M.D., F.A.C.S.   Dennys Awan M.D., F.A.C.S.  Khalif Crane M.D., F.A.C.S.  Dae Ayala M.D., F.A.C.S.  TAINA Sage M.D., F.A.C.S  TAINA Liriano M.D., F.A.C.S.   Tucker Tang M.D., F.A.C.S.  TAINA Little M.D. R. Anthony Perez-Tamayo, M.D. PhD.  TAINA Buck M.D. F A LEAH Rajput M.D.   Lalit Stuart M.D.   Jose Carrion M.D.  TAINA Mcnamara D.O., F.A.CTresS.  Dennys Mckay M.D., F.A.C.S.  Dae Mccrary M.D.        Welcome to [facility]    As you contemplate possible surgical treatment, it is very important to us that you understand fully what is being discussed, that all of your questions have been answered, and that your options for treatment have been fully explained.    To that end, on the following page we will ask you some questions to make certain that you understand everything which has been explained to you. Included in this understanding is that there are both surgical and nonsurgical treatments available for you, that you have options regarding where your care is given, and what doctors are involved in your care. Included in these options would, of course, be the option to elect for no treatment whatsoever. We especially want to be sure that you have had a chance to have all of your questions and  concerns answered. If there are any issues which have not been adequately addressed, we ask you to bring them forward so that we can thoroughly address them.    A patient who is fully informed and understands their condition and options for treatment, as well as potential adverse effects of treatment, is going to be a patient who receives the most benefit out of care rendered. Our goal in addition to providing excellent surgical care is to provide the necessary information to you and your family in order to make decisions which are appropriate relative to your own care.    Please take the time necessary to read and answer the questions on the next page. Again, if you have any questions, bring them forward and we will certainly address them.    Sincerely,    [facility].    _______________________  ____________________________________  Date:                                             Patient Signature  ________________________  Ilda Gutierrez  Witness Consent Form         Revised: 2015  Patient Name: Ilda Gutierrez     : 1955                 Printed: 6/15/13 9:43 AM     Medical Record #: [id number]                Page: 1 of  2        [facility]  Supplemental Consent Form    A [facility] surgeon has met with me and explained the matter of my illness, and what treatments might be available to improve my condition. As a result of that conversation, I understand the following:    A [facility] surgeon met with me and explained, in detail, the nature of my condition for which surgery is being contemplated. The procedure to be performed  Is: CORONARY ARTERY BYPASS GRAFT            Yes _____ No _____    A [facility] surgeon has explained to me that there are alternatives to surgery which might include no surgery, medical therapy, or interventional treatment, among other options and the risks and benefits of the different treatment options:    Yes _____ No  _____    A [facility] surgeon as explained to me that if I should so desire, he/she is willing to explain my case and the surgical and non-surgical options to family members:            Yes _____ No _____    A [facility] surgeon has answered all of my questions regarding the topics we have discussed. I have been invited to ask more questions:  Yes _____ No _____    A [facility] surgeon has explained to me that if I seek other options or wish treatment at another facility in [place] or [place], or anywhere in the [place], that his/her office will assist me in making such accommodations:   Yes _____ No _____    A [facility] surgeon has explained to me, that death, risk of bleeding, stroke, multi-organ failure, heart attack or other complications are risks for the proposed surgical procedure:           Yes _____ No _____    A [facility] surgeon has explained to me that I have the right to cancel or postpone the surgery at any time prior to the start of surgery:    Yes _____ No _____    The nature and options for treatment for my condition have been explained to me, in detail, by a [facility] surgeon and all questions have been answered to my satisfaction. I understand that I am not required to undergo surgery, and further, that if I so desire, I could have surgery accomplished by another surgeon or at another institution. I understand and accept that which has been explained to me. I am able to make my decisions knowingly and willfully based on the data.    ______________________   __________________________________  Date       Patient Signature  ______________________________ Ilda JESSICA Gutierrez  Witness Consent Form   Patient Name: Ilda Gutierrez     DATB: 4/12/1955                 Medical Record #: [id number]    Page: 2 of 2        Printed: June 6, 2024

## (undated) NOTE — ED AVS SNAPSHOT
Lalita Kidney   MRN: [id number]    Department:  [facility] Emergency Department   Date of Visit:  12/6/2018           Disclosure     Insurance plans vary and the physician(s) referred by the ER may not be covered by your plan.  Please contact your tell this physician (or your personal doctor if your instructions are to return to your personal doctor) about any new or lasting problems. The primary care or specialist physician will see patients referred from the [facility] Emergency Department.  Arthor Bernheim

## (undated) NOTE — LETTER
[place] [address], [place], [place]    Authorization for Surgical Operation and Procedure                               I hereby authorize Toni Anderson MD, my physician and his/her assistants (if applicable), which may include medical students, residents, and/or fellows, to perform the following surgical operation/ procedure and administer such anesthesia as may be determined necessary by my physician: Operation/Procedure name (s) COLONOSCOPY/ESOPHAGOGASTRODUODENOSCOPY on Ilda Gutierrez   2.   I recognize that during the surgical operation/procedure, unforeseen conditions may necessitate additional or different procedures than those listed above.  I, therefore, further authorize and request that the above-named surgeon, assistants, or designees perform such procedures as are, in their judgment, necessary and desirable.    3.   My surgeon/physician has discussed prior to my surgery the potential benefits, risks and side effects of this procedure; the likelihood of achieving goals; and potential problems that might occur during recuperation.  They also discussed reasonable alternatives to the procedure, including risks, benefits, and side effects related to the alternatives and risks related to not receiving this procedure.  I have had all my questions answered and I acknowledge that no guarantee has been made as to the result that may be obtained.    4.   Should the need arise during my operation/procedure, which includes change of level of care prior to discharge, I also consent to the administration of blood and/or blood products.  Further, I understand that despite careful testing and screening of blood or blood products by collecting agencies, I may still be subject to ill effects as a result of receiving a blood transfusion and/or blood products.  The following are some, but not all, of the potential risks that can occur: fever and allergic reactions, hemolytic reactions,  transmission of diseases such as Hepatitis, AIDS and Cytomegalovirus (CMV) and fluid overload.  In the event that I wish to have an autologous transfusion of my own blood, or a directed donor transfusion, I will discuss this with my physician.  Check only if Refusing Blood or Blood Products  I understand refusal of blood or blood products as deemed necessary by my physician may have serious consequences to my condition to include possible death. I hereby assume responsibility for my refusal and release the hospital, its personnel, and my physicians from any responsibility for the consequences of my refusal.    o  Refuse   5.   I authorize the use of any specimen, organs, tissues, body parts or foreign objects that may be removed from my body during the operation/procedure for diagnosis, research or teaching purposes and their subsequent disposal by hospital authorities.  I also authorize the release of specimen test results and/or written reports to my treating physician on the hospital medical staff or other referring or consulting physicians involved in my care, at the discretion of the Pathologist or my treating physician.    6.   I consent to the photographing or videotaping of the operations or procedures to be performed, including appropriate portions of my body for medical, scientific, or educational purposes, provided my identity is not revealed by the pictures or by descriptive texts accompanying them.  If the procedure has been photographed/videotaped, the surgeon will obtain the original picture, image, videotape or CD.  The hospital will not be responsible for storage, release or maintenance of the picture, image, tape or CD.    7.   I consent to the presence of a  or observers in the operating room as deemed necessary by my physician or their designees.    8.   I recognize that in the event my procedure results in extended X-Ray/fluoroscopy time, I may develop a skin reaction.    9. If  I have a Do Not Attempt Resuscitation (DNAR) order in place, that status will be suspended while in the operating room, procedural suite, and during the recovery period unless otherwise explicitly stated by me (or a person authorized to consent on my behalf). The surgeon or my attending physician will determine when the applicable recovery period ends for purposes of reinstating the DNAR order.  10. Patients having a sterilization procedure: I understand that if the procedure is successful the results will be permanent and it will therefore be impossible for me to inseminate, conceive, or bear children.  I also understand that the procedure is intended to result in sterility, although the result has not been guaranteed.   11. I acknowledge that my physician has explained sedation/analgesia administration to me including the risk and benefits I consent to the administration of sedation/analgesia as may be necessary or desirable in the judgment of my physician.    I CERTIFY THAT I HAVE READ AND FULLY UNDERSTAND THE ABOVE CONSENT TO OPERATION and/or OTHER PROCEDURE.     ____________________________________  _________________________________        ______________________________  Signature of Patient    Signature of Responsible Person                Printed Name of Responsible Person                                      ____________________________________  _____________________________                ________________________________  Signature of Witness        Date  Time         Relationship to Patient    STATEMENT OF PHYSICIAN My signature below affirms that prior to the time of the procedure; I have explained to the patient and/or his/her legal representative, the risks and benefits involved in the proposed treatment and any reasonable alternative to the proposed treatment. I have also explained the risks and benefits involved in refusal of the proposed treatment and alternatives to the proposed treatment and have  answered the patient's questions. If I have a significant financial interest in a co-management agreement or a significant financial interest in any product or implant, or other significant relationship used in this procedure/surgery, I have disclosed this and had a discussion with my patient.     _____________________________________________________              _____________________________  (Signature of Physician)                                                                                         (Date)                                   (Time)  Patient Name: Ilda Gutierrez      : 1955      Printed: 10/24/2024     Medical Record #: [id number]                                      Page 1 of 1

## (undated) NOTE — LETTER
[facility]  Administration of Anesthesia  Ilda CARPENTER agree to be cared for by a physician anesthesiologist alone and/or with a nurse anesthetist, who is specially trained to monitor me and give me medicine to put me to sleep or keep me comfortable during my procedure    I understand that my anesthesiologist and/or anesthetist is not an employee or agent of [facility] or [employer]. He or she works for [facility]    As the patient asking for anesthesia services, I agree to:  Allow the anesthesiologist (anesthesia doctor) to give me medicine and do additional procedures as necessary. Some examples are: Starting or using an “IV” to give me medicine, fluids or blood during my procedure, and having a breathing tube placed to help me breathe when I’m asleep (intubation). In the event that my heart stops working properly, I understand that my anesthesiologist will make every effort to sustain my life, unless otherwise directed by [facility] Do Not Resuscitate documents.  Tell my anesthesia doctor before my procedure:  If I am pregnant.  The last time that I ate or drank.  iii. All of the medicines I take (including prescriptions, herbal supplements, and pills I can buy without a prescription (including street drugs/illegal medications). Failure to inform my anesthesiologist about these medicines may increase my risk of anesthetic complications.  iv.If I am allergic to anything or have had a reaction to anesthesia before.  I understand how the anesthesia medicine will help me (benefits).  I understand that with any type of anesthesia medicine there are risks:  The most common risks are: nausea, vomiting, sore throat, muscle soreness, damage to my eyes, mouth, or teeth (from breathing tube placement).  Rare risks include: remembering what happened during my procedure, allergic reactions to medications, injury to my airway, heart, lungs, vision, nerves, or  muscles and in extremely rare instances death.  My doctor has explained to me other choices available to me for my care (alternatives).  Pregnant Patients (“epidural”):  I understand that the risks of having an epidural (medicine given into my back to help control pain during labor), include itching, low blood pressure, difficulty urinating, headache or slowing of the baby’s heart. Very rare risks include infection, bleeding, seizure, irregular heart rhythms and nerve injury.  Regional Anesthesia (“spinal”, “epidural”, & “nerve blocks”):  I understand that rare but potential complications include headache, bleeding, infection, seizure, irregular heart rhythms, and nerve injury.    _____________________________________________________________________________  Patient (or Representative) Signature/Relationship to Patient  Date   Time    _____________________________________________________________________________   Name (if used)    Language/Organization   Time    _____________________________________________________________________________  Nurse Anesthetist Signature     Date   Time  _____________________________________________________________________________  Anesthesiologist Signature     Date   Time  I have discussed the procedure and information above with the patient (or patient’s representative) and answered their questions. The patient or their representative has agreed to have anesthesia services.    _____________________________________________________________________________  Witness        Date   Time  I have verified that the signature is that of the patient or patient’s representative, and that it was signed before the procedure  Patient Name: Ilda Gutierrez     : 1955                 Printed: 10/24/2024 at 5:01 PM    Medical Record #: [id number]                                            Page 1 of 1  ----------ANESTHESIA CONSENT----------